# Patient Record
Sex: FEMALE | Race: BLACK OR AFRICAN AMERICAN | NOT HISPANIC OR LATINO | URBAN - METROPOLITAN AREA
[De-identification: names, ages, dates, MRNs, and addresses within clinical notes are randomized per-mention and may not be internally consistent; named-entity substitution may affect disease eponyms.]

---

## 2018-10-23 ENCOUNTER — EMERGENCY (EMERGENCY)
Facility: HOSPITAL | Age: 73
LOS: 1 days | Discharge: HOME | End: 2018-10-23
Admitting: EMERGENCY MEDICINE
Payer: MEDICARE

## 2018-10-23 VITALS
TEMPERATURE: 98 F | OXYGEN SATURATION: 99 % | RESPIRATION RATE: 18 BRPM | WEIGHT: 171.96 LBS | DIASTOLIC BLOOD PRESSURE: 86 MMHG | SYSTOLIC BLOOD PRESSURE: 153 MMHG | HEART RATE: 72 BPM

## 2018-10-23 DIAGNOSIS — Z79.899 OTHER LONG TERM (CURRENT) DRUG THERAPY: ICD-10-CM

## 2018-10-23 DIAGNOSIS — I10 ESSENTIAL (PRIMARY) HYPERTENSION: ICD-10-CM

## 2018-10-23 DIAGNOSIS — H66.92 OTITIS MEDIA, UNSPECIFIED, LEFT EAR: ICD-10-CM

## 2018-10-23 DIAGNOSIS — H92.02 OTALGIA, LEFT EAR: ICD-10-CM

## 2018-10-23 DIAGNOSIS — Z79.82 LONG TERM (CURRENT) USE OF ASPIRIN: ICD-10-CM

## 2018-10-23 PROCEDURE — 99283 EMERGENCY DEPT VISIT LOW MDM: CPT

## 2018-10-23 RX ORDER — AMOXICILLIN 250 MG/5ML
1 SUSPENSION, RECONSTITUTED, ORAL (ML) ORAL
Qty: 21 | Refills: 0 | OUTPATIENT
Start: 2018-10-23 | End: 2018-10-29

## 2018-10-23 NOTE — ED PROVIDER NOTE - OBJECTIVE STATEMENT
73yoF with pmhx of HTN presents with L ear pain and decreased hearing x1 month. Pt reports history of cerumen impaction of R ear, was seen by PMD for cerumen impaction on left. Pt states ear was irrigated in the office and she was instructed to use peroxide drops nightly. Pt reports she has been following these instructions but her symptoms have not improved. Denies fever, nasal congestion, sore throat, tinnitus, drainage from ear.

## 2018-10-23 NOTE — ED PROVIDER NOTE - PHYSICAL EXAMINATION
VITAL SIGNS: I have reviewed nursing notes and confirm.  CONSTITUTIONAL: Well-developed; well-nourished; in no acute distress.   SKIN:  warm and dry, no acute rash.   HEAD:  normocephalic, atraumatic.  EYES: PERRL, EOM intact; conjunctiva and sclera clear.  ENT: No nasal discharge; airway clear. Purulent discharge behind left TM with absent light reflex, slight bulging. No canal edema or erythema. Right TM non-erythematous, non-edematous.   NECK: Supple; non tender.  CARD: S1, S2 normal; no murmurs, gallops, or rubs. Regular rate and rhythm.   RESP:  Clear to auscultation b/l, no wheezes, rales or rhonchi.  ABD: Normal bowel sounds; soft; non-distended; non-tender; no guarding/ rebound.  EXT: Normal ROM. No clubbing, cyanosis or edema. 2+ pulses to b/l ue/le.  NEURO: Alert, oriented, grossly unremarkable  PSYCH: Cooperative, mood and affect appropriate.

## 2018-10-23 NOTE — ED PROVIDER NOTE - MEDICAL DECISION MAKING DETAILS
ED course unremarkable - afebrile and hemodynamically stable. Exam c/w otitis media. Will start pt on course of amoxicillin. Provide follow up with ENT. Return precautions given.

## 2018-10-23 NOTE — ED ADULT TRIAGE NOTE - CHIEF COMPLAINT QUOTE
pt c/o decreased hearing to her left ear for about 2 months. pt states " I always get ear wax in my right ear and now I think its on my left" pt denies ear pain, discharge, fever.

## 2023-04-20 NOTE — ED PROVIDER NOTE - DISCUSSED CLINICAL AND RADIOLOGICAL FINDINGS WITH, MDM
patient Benzoyl Peroxide Counseling: Patient counseled that medicine may cause skin irritation and bleach clothing.  In the event of skin irritation, the patient was advised to reduce the amount of the drug applied or use it less frequently.   The patient verbalized understanding of the proper use and possible adverse effects of benzoyl peroxide.  All of the patient's questions and concerns were addressed.

## 2023-10-08 ENCOUNTER — INPATIENT (INPATIENT)
Facility: HOSPITAL | Age: 78
LOS: 9 days | Discharge: NURSING, REHAB OR LONG-TERM CARE | DRG: 64 | End: 2023-10-18
Attending: INTERNAL MEDICINE | Admitting: INTERNAL MEDICINE
Payer: MEDICARE

## 2023-10-08 VITALS
SYSTOLIC BLOOD PRESSURE: 239 MMHG | OXYGEN SATURATION: 97 % | WEIGHT: 114.64 LBS | TEMPERATURE: 98 F | DIASTOLIC BLOOD PRESSURE: 148 MMHG | HEART RATE: 85 BPM | HEIGHT: 59 IN | RESPIRATION RATE: 20 BRPM

## 2023-10-08 PROCEDURE — 99291 CRITICAL CARE FIRST HOUR: CPT

## 2023-10-08 NOTE — ED PROVIDER NOTE - NS ED MD DISPO ISOLATION TYPES
[FreeTextEntry1] : I, Dr. Kane Guerra, personally performed the evaluation and management (E/M) services for this established patient who presents today with (a) new problem(s)/exacerbation of (an) existing condition(s).  That E/M includes conducting the examination, assessing all new/exacerbated conditions, and establishing a new plan of care.  Today, my ACP, Caroline Infante NP, was here to observe my evaluation and management services for this new problem/exacerbated condition to be followed going forward. \par \par \par The documentation for this encounter was entered by Zenia Boswell acting as a scribe for Dr.Gary Guerra.\par 
None

## 2023-10-08 NOTE — ED PROVIDER NOTE - CLINICAL SUMMARY MEDICAL DECISION MAKING FREE TEXT BOX
Patient presenting with symptoms suggestive of acute stroke, outside window for thrombolytics based off LKW now +6 hours - stroke code activated, labs/imaging per protocol.

## 2023-10-08 NOTE — ED PROVIDER NOTE - PROGRESS NOTE DETAILS
Patient still significantly hypertensive - labetalol IVP ordered.  CTA noting thoracic aortic aneurysm, so further imaging ordered to evaluate this.  Also noted possible cerebral aneurysm, no evidence of SAH.  Will require admission for BP management and further workup - if still significantly hypertensive after labetalol may consult ICU. Patient had brief response to labetalol but shortly after administration back to being significantly hypertensive.  CTA performed noting significant dilatation/aneurysm of descending thoracic aneurysm but no dissection on my review, also bilateral staghorn calculi of kidneys.  ICU consulted given degree of hypertension for possible admission for management.

## 2023-10-08 NOTE — ED ADULT TRIAGE NOTE - CHIEF COMPLAINT QUOTE
Rt arm weakness, as per  "pt was walking and her legs gave out" denies any head injury, last seen normal 1115pm

## 2023-10-08 NOTE — ED PROVIDER NOTE - OBJECTIVE STATEMENT
78-year-old woman presenting with  for right-sided weakness.  He reports that she has been having some baseline ambulatory difficulty ongoing for months but he went to go check on her this evening and found that she could not move her right side or stand.  The last time he saw her at her baseline was around 5 PM.  There is no reported past medical history.  They do not report that she takes any medications.

## 2023-10-08 NOTE — ED PROVIDER NOTE - PHYSICAL EXAMINATION
Exam:  General: Patient well appearing, severely hypertensive otherwise vital signs within normal limits  HEENT: airway patent with moist mucous membranes  Cardiac: RRR S1/S2 with strong peripheral pulses  Respiratory: lungs clear without respiratory distress  GI: abdomen soft, non tender, non distended  Neuro: L facial droop, RUE 1/5, RLE 1/5, LUE 3/5, LLE 3/5, SLTI  Skin: warm, well perfused  Psych: normal mood and affect

## 2023-10-09 DIAGNOSIS — I16.1 HYPERTENSIVE EMERGENCY: ICD-10-CM

## 2023-10-09 LAB
A1C WITH ESTIMATED AVERAGE GLUCOSE RESULT: 5.9 % — HIGH (ref 4–5.6)
ALBUMIN SERPL ELPH-MCNC: 3.5 G/DL — SIGNIFICANT CHANGE UP (ref 3.5–5)
ALBUMIN SERPL ELPH-MCNC: 3.8 G/DL — SIGNIFICANT CHANGE UP (ref 3.5–5)
ALP SERPL-CCNC: 119 U/L — SIGNIFICANT CHANGE UP (ref 40–120)
ALP SERPL-CCNC: 122 U/L — HIGH (ref 40–120)
ALT FLD-CCNC: 15 U/L DA — SIGNIFICANT CHANGE UP (ref 10–60)
ALT FLD-CCNC: 16 U/L DA — SIGNIFICANT CHANGE UP (ref 10–60)
ANION GAP SERPL CALC-SCNC: 4 MMOL/L — LOW (ref 5–17)
ANION GAP SERPL CALC-SCNC: 7 MMOL/L — SIGNIFICANT CHANGE UP (ref 5–17)
APPEARANCE UR: ABNORMAL
APTT BLD: 28.1 SEC — SIGNIFICANT CHANGE UP (ref 24.5–35.6)
APTT BLD: 31.7 SEC — SIGNIFICANT CHANGE UP (ref 24.5–35.6)
AST SERPL-CCNC: 12 U/L — SIGNIFICANT CHANGE UP (ref 10–40)
AST SERPL-CCNC: 14 U/L — SIGNIFICANT CHANGE UP (ref 10–40)
BACTERIA # UR AUTO: ABNORMAL /HPF
BASOPHILS # BLD AUTO: 0.02 K/UL — SIGNIFICANT CHANGE UP (ref 0–0.2)
BASOPHILS # BLD AUTO: 0.03 K/UL — SIGNIFICANT CHANGE UP (ref 0–0.2)
BASOPHILS NFR BLD AUTO: 0.3 % — SIGNIFICANT CHANGE UP (ref 0–2)
BASOPHILS NFR BLD AUTO: 0.4 % — SIGNIFICANT CHANGE UP (ref 0–2)
BILIRUB SERPL-MCNC: 0.5 MG/DL — SIGNIFICANT CHANGE UP (ref 0.2–1.2)
BILIRUB SERPL-MCNC: 0.5 MG/DL — SIGNIFICANT CHANGE UP (ref 0.2–1.2)
BILIRUB UR-MCNC: NEGATIVE — SIGNIFICANT CHANGE UP
BUN SERPL-MCNC: 13 MG/DL — SIGNIFICANT CHANGE UP (ref 7–18)
BUN SERPL-MCNC: 15 MG/DL — SIGNIFICANT CHANGE UP (ref 7–18)
CALCIUM SERPL-MCNC: 9.4 MG/DL — SIGNIFICANT CHANGE UP (ref 8.4–10.5)
CALCIUM SERPL-MCNC: 9.6 MG/DL — SIGNIFICANT CHANGE UP (ref 8.4–10.5)
CHLORIDE SERPL-SCNC: 103 MMOL/L — SIGNIFICANT CHANGE UP (ref 96–108)
CHLORIDE SERPL-SCNC: 106 MMOL/L — SIGNIFICANT CHANGE UP (ref 96–108)
CHOLEST SERPL-MCNC: 216 MG/DL — HIGH
CO2 SERPL-SCNC: 27 MMOL/L — SIGNIFICANT CHANGE UP (ref 22–31)
CO2 SERPL-SCNC: 30 MMOL/L — SIGNIFICANT CHANGE UP (ref 22–31)
COLOR SPEC: YELLOW — SIGNIFICANT CHANGE UP
CREAT SERPL-MCNC: 0.9 MG/DL — SIGNIFICANT CHANGE UP (ref 0.5–1.3)
CREAT SERPL-MCNC: 0.9 MG/DL — SIGNIFICANT CHANGE UP (ref 0.5–1.3)
DIFF PNL FLD: NEGATIVE — SIGNIFICANT CHANGE UP
EGFR: 66 ML/MIN/1.73M2 — SIGNIFICANT CHANGE UP
EGFR: 66 ML/MIN/1.73M2 — SIGNIFICANT CHANGE UP
EOSINOPHIL # BLD AUTO: 0.01 K/UL — SIGNIFICANT CHANGE UP (ref 0–0.5)
EOSINOPHIL # BLD AUTO: 0.04 K/UL — SIGNIFICANT CHANGE UP (ref 0–0.5)
EOSINOPHIL NFR BLD AUTO: 0.1 % — SIGNIFICANT CHANGE UP (ref 0–6)
EOSINOPHIL NFR BLD AUTO: 0.5 % — SIGNIFICANT CHANGE UP (ref 0–6)
EPI CELLS # UR: PRESENT
ESTIMATED AVERAGE GLUCOSE: 123 MG/DL — HIGH (ref 68–114)
GLUCOSE SERPL-MCNC: 114 MG/DL — HIGH (ref 70–99)
GLUCOSE SERPL-MCNC: 124 MG/DL — HIGH (ref 70–99)
GLUCOSE UR QL: NEGATIVE MG/DL — SIGNIFICANT CHANGE UP
HCT VFR BLD CALC: 39.7 % — SIGNIFICANT CHANGE UP (ref 34.5–45)
HCT VFR BLD CALC: 41.4 % — SIGNIFICANT CHANGE UP (ref 34.5–45)
HCV AB S/CO SERPL IA: 0.35 S/CO — SIGNIFICANT CHANGE UP (ref 0–0.99)
HCV AB SERPL-IMP: SIGNIFICANT CHANGE UP
HDLC SERPL-MCNC: 60 MG/DL — SIGNIFICANT CHANGE UP
HGB BLD-MCNC: 12.6 G/DL — SIGNIFICANT CHANGE UP (ref 11.5–15.5)
HGB BLD-MCNC: 13.2 G/DL — SIGNIFICANT CHANGE UP (ref 11.5–15.5)
IMM GRANULOCYTES NFR BLD AUTO: 0.4 % — SIGNIFICANT CHANGE UP (ref 0–0.9)
IMM GRANULOCYTES NFR BLD AUTO: 0.4 % — SIGNIFICANT CHANGE UP (ref 0–0.9)
INR BLD: 0.95 RATIO — SIGNIFICANT CHANGE UP (ref 0.85–1.18)
INR BLD: 1.05 RATIO — SIGNIFICANT CHANGE UP (ref 0.85–1.18)
KETONES UR-MCNC: NEGATIVE MG/DL — SIGNIFICANT CHANGE UP
LEUKOCYTE ESTERASE UR-ACNC: ABNORMAL
LIPID PNL WITH DIRECT LDL SERPL: 138 MG/DL — HIGH
LYMPHOCYTES # BLD AUTO: 1.16 K/UL — SIGNIFICANT CHANGE UP (ref 1–3.3)
LYMPHOCYTES # BLD AUTO: 1.96 K/UL — SIGNIFICANT CHANGE UP (ref 1–3.3)
LYMPHOCYTES # BLD AUTO: 14.8 % — SIGNIFICANT CHANGE UP (ref 13–44)
LYMPHOCYTES # BLD AUTO: 23.6 % — SIGNIFICANT CHANGE UP (ref 13–44)
MAGNESIUM SERPL-MCNC: 2.2 MG/DL — SIGNIFICANT CHANGE UP (ref 1.6–2.6)
MCHC RBC-ENTMCNC: 27.9 PG — SIGNIFICANT CHANGE UP (ref 27–34)
MCHC RBC-ENTMCNC: 28 PG — SIGNIFICANT CHANGE UP (ref 27–34)
MCHC RBC-ENTMCNC: 31.7 GM/DL — LOW (ref 32–36)
MCHC RBC-ENTMCNC: 31.9 GM/DL — LOW (ref 32–36)
MCV RBC AUTO: 87.7 FL — SIGNIFICANT CHANGE UP (ref 80–100)
MCV RBC AUTO: 88 FL — SIGNIFICANT CHANGE UP (ref 80–100)
MONOCYTES # BLD AUTO: 0.33 K/UL — SIGNIFICANT CHANGE UP (ref 0–0.9)
MONOCYTES # BLD AUTO: 0.49 K/UL — SIGNIFICANT CHANGE UP (ref 0–0.9)
MONOCYTES NFR BLD AUTO: 4.2 % — SIGNIFICANT CHANGE UP (ref 2–14)
MONOCYTES NFR BLD AUTO: 5.9 % — SIGNIFICANT CHANGE UP (ref 2–14)
NEUTROPHILS # BLD AUTO: 5.77 K/UL — SIGNIFICANT CHANGE UP (ref 1.8–7.4)
NEUTROPHILS # BLD AUTO: 6.28 K/UL — SIGNIFICANT CHANGE UP (ref 1.8–7.4)
NEUTROPHILS NFR BLD AUTO: 69.2 % — SIGNIFICANT CHANGE UP (ref 43–77)
NEUTROPHILS NFR BLD AUTO: 80.2 % — HIGH (ref 43–77)
NITRITE UR-MCNC: NEGATIVE — SIGNIFICANT CHANGE UP
NON HDL CHOLESTEROL: 156 MG/DL — HIGH
NRBC # BLD: 0 /100 WBCS — SIGNIFICANT CHANGE UP (ref 0–0)
NRBC # BLD: 0 /100 WBCS — SIGNIFICANT CHANGE UP (ref 0–0)
PH UR: 8 — SIGNIFICANT CHANGE UP (ref 5–8)
PHOSPHATE SERPL-MCNC: 3.7 MG/DL — SIGNIFICANT CHANGE UP (ref 2.5–4.5)
PLATELET # BLD AUTO: 190 K/UL — SIGNIFICANT CHANGE UP (ref 150–400)
PLATELET # BLD AUTO: 199 K/UL — SIGNIFICANT CHANGE UP (ref 150–400)
POTASSIUM SERPL-MCNC: 3.1 MMOL/L — LOW (ref 3.5–5.3)
POTASSIUM SERPL-MCNC: 3.8 MMOL/L — SIGNIFICANT CHANGE UP (ref 3.5–5.3)
POTASSIUM SERPL-SCNC: 3.1 MMOL/L — LOW (ref 3.5–5.3)
POTASSIUM SERPL-SCNC: 3.8 MMOL/L — SIGNIFICANT CHANGE UP (ref 3.5–5.3)
PROT SERPL-MCNC: 7.8 G/DL — SIGNIFICANT CHANGE UP (ref 6–8.3)
PROT SERPL-MCNC: 8.1 G/DL — SIGNIFICANT CHANGE UP (ref 6–8.3)
PROT UR-MCNC: NEGATIVE MG/DL — SIGNIFICANT CHANGE UP
PROTHROM AB SERPL-ACNC: 10.8 SEC — SIGNIFICANT CHANGE UP (ref 9.5–13)
PROTHROM AB SERPL-ACNC: 11.9 SEC — SIGNIFICANT CHANGE UP (ref 9.5–13)
RBC # BLD: 4.51 M/UL — SIGNIFICANT CHANGE UP (ref 3.8–5.2)
RBC # BLD: 4.72 M/UL — SIGNIFICANT CHANGE UP (ref 3.8–5.2)
RBC # FLD: 12.8 % — SIGNIFICANT CHANGE UP (ref 10.3–14.5)
RBC # FLD: 13 % — SIGNIFICANT CHANGE UP (ref 10.3–14.5)
RBC CASTS # UR COMP ASSIST: 2 /HPF — SIGNIFICANT CHANGE UP (ref 0–4)
SODIUM SERPL-SCNC: 137 MMOL/L — SIGNIFICANT CHANGE UP (ref 135–145)
SODIUM SERPL-SCNC: 140 MMOL/L — SIGNIFICANT CHANGE UP (ref 135–145)
SP GR SPEC: 1.02 — SIGNIFICANT CHANGE UP (ref 1–1.03)
TRIGL SERPL-MCNC: 91 MG/DL — SIGNIFICANT CHANGE UP
TROPONIN I, HIGH SENSITIVITY RESULT: 24.4 NG/L — SIGNIFICANT CHANGE UP
UROBILINOGEN FLD QL: 0.2 MG/DL — SIGNIFICANT CHANGE UP (ref 0.2–1)
WBC # BLD: 7.83 K/UL — SIGNIFICANT CHANGE UP (ref 3.8–10.5)
WBC # BLD: 8.32 K/UL — SIGNIFICANT CHANGE UP (ref 3.8–10.5)
WBC # FLD AUTO: 7.83 K/UL — SIGNIFICANT CHANGE UP (ref 3.8–10.5)
WBC # FLD AUTO: 8.32 K/UL — SIGNIFICANT CHANGE UP (ref 3.8–10.5)
WBC UR QL: 12 /HPF — HIGH (ref 0–5)

## 2023-10-09 PROCEDURE — 71275 CT ANGIOGRAPHY CHEST: CPT | Mod: 26,MA

## 2023-10-09 PROCEDURE — 0042T: CPT | Mod: MA

## 2023-10-09 PROCEDURE — 70496 CT ANGIOGRAPHY HEAD: CPT | Mod: 26,MA

## 2023-10-09 PROCEDURE — 99291 CRITICAL CARE FIRST HOUR: CPT

## 2023-10-09 PROCEDURE — 70498 CT ANGIOGRAPHY NECK: CPT | Mod: 26,MA

## 2023-10-09 PROCEDURE — 99291 CRITICAL CARE FIRST HOUR: CPT | Mod: GC

## 2023-10-09 PROCEDURE — 74174 CTA ABD&PLVS W/CONTRAST: CPT | Mod: 26,MA

## 2023-10-09 PROCEDURE — 70450 CT HEAD/BRAIN W/O DYE: CPT | Mod: 26,MA,59

## 2023-10-09 RX ORDER — HEPARIN SODIUM 5000 [USP'U]/ML
4000 INJECTION INTRAVENOUS; SUBCUTANEOUS ONCE
Refills: 0 | Status: COMPLETED | OUTPATIENT
Start: 2023-10-09 | End: 2023-10-09

## 2023-10-09 RX ORDER — INFLUENZA VIRUS VACCINE 15; 15; 15; 15 UG/.5ML; UG/.5ML; UG/.5ML; UG/.5ML
0.7 SUSPENSION INTRAMUSCULAR ONCE
Refills: 0 | Status: DISCONTINUED | OUTPATIENT
Start: 2023-10-09 | End: 2023-10-18

## 2023-10-09 RX ORDER — LABETALOL HCL 100 MG
0.5 TABLET ORAL
Qty: 200 | Refills: 0 | Status: DISCONTINUED | OUTPATIENT
Start: 2023-10-09 | End: 2023-10-10

## 2023-10-09 RX ORDER — ENOXAPARIN SODIUM 100 MG/ML
40 INJECTION SUBCUTANEOUS EVERY 24 HOURS
Refills: 0 | Status: DISCONTINUED | OUTPATIENT
Start: 2023-10-09 | End: 2023-10-18

## 2023-10-09 RX ORDER — POTASSIUM CHLORIDE 20 MEQ
10 PACKET (EA) ORAL
Refills: 0 | Status: COMPLETED | OUTPATIENT
Start: 2023-10-09 | End: 2023-10-09

## 2023-10-09 RX ORDER — CHLORHEXIDINE GLUCONATE 213 G/1000ML
1 SOLUTION TOPICAL
Refills: 0 | Status: DISCONTINUED | OUTPATIENT
Start: 2023-10-09 | End: 2023-10-18

## 2023-10-09 RX ORDER — LABETALOL HCL 100 MG
10 TABLET ORAL ONCE
Refills: 0 | Status: COMPLETED | OUTPATIENT
Start: 2023-10-09 | End: 2023-10-09

## 2023-10-09 RX ORDER — ATORVASTATIN CALCIUM 80 MG/1
80 TABLET, FILM COATED ORAL AT BEDTIME
Refills: 0 | Status: DISCONTINUED | OUTPATIENT
Start: 2023-10-09 | End: 2023-10-18

## 2023-10-09 RX ORDER — HEPARIN SODIUM 5000 [USP'U]/ML
INJECTION INTRAVENOUS; SUBCUTANEOUS
Qty: 25000 | Refills: 0 | Status: DISCONTINUED | OUTPATIENT
Start: 2023-10-09 | End: 2023-10-09

## 2023-10-09 RX ORDER — LABETALOL HCL 100 MG
100 TABLET ORAL EVERY 8 HOURS
Refills: 0 | Status: DISCONTINUED | OUTPATIENT
Start: 2023-10-09 | End: 2023-10-10

## 2023-10-09 RX ORDER — NICARDIPINE HYDROCHLORIDE 30 MG/1
5 CAPSULE, EXTENDED RELEASE ORAL
Qty: 40 | Refills: 0 | Status: DISCONTINUED | OUTPATIENT
Start: 2023-10-09 | End: 2023-10-09

## 2023-10-09 RX ADMIN — Medication 10 MILLIGRAM(S): at 02:20

## 2023-10-09 RX ADMIN — Medication 100 MILLIGRAM(S): at 21:25

## 2023-10-09 RX ADMIN — ATORVASTATIN CALCIUM 80 MILLIGRAM(S): 80 TABLET, FILM COATED ORAL at 21:25

## 2023-10-09 RX ADMIN — CHLORHEXIDINE GLUCONATE 1 APPLICATION(S): 213 SOLUTION TOPICAL at 16:20

## 2023-10-09 RX ADMIN — HEPARIN SODIUM 4000 UNIT(S): 5000 INJECTION INTRAVENOUS; SUBCUTANEOUS at 13:47

## 2023-10-09 RX ADMIN — NICARDIPINE HYDROCHLORIDE 25 MG/HR: 30 CAPSULE, EXTENDED RELEASE ORAL at 04:08

## 2023-10-09 RX ADMIN — HEPARIN SODIUM 1000 UNIT(S)/HR: 5000 INJECTION INTRAVENOUS; SUBCUTANEOUS at 13:58

## 2023-10-09 RX ADMIN — Medication 100 MILLIEQUIVALENT(S): at 06:12

## 2023-10-09 RX ADMIN — Medication 100 MILLIEQUIVALENT(S): at 07:34

## 2023-10-09 RX ADMIN — NICARDIPINE HYDROCHLORIDE 25 MG/HR: 30 CAPSULE, EXTENDED RELEASE ORAL at 04:04

## 2023-10-09 RX ADMIN — Medication 30 MG/MIN: at 05:13

## 2023-10-09 RX ADMIN — ENOXAPARIN SODIUM 40 MILLIGRAM(S): 100 INJECTION SUBCUTANEOUS at 18:06

## 2023-10-09 RX ADMIN — Medication 100 MILLIGRAM(S): at 13:21

## 2023-10-09 RX ADMIN — Medication 30 MG/MIN: at 18:06

## 2023-10-09 RX ADMIN — Medication 100 MILLIEQUIVALENT(S): at 08:36

## 2023-10-09 NOTE — ED ADULT NURSE NOTE - PRO INTERPRETER NEED 2
I am an RN.     Pt states she has had urinary frequency, pain, and burning with urination.   Reviewed with Edie Gallego.  Marlene patel collect urnie and send Pt Home   Edie will review.  Pt unable to void while here but daughter did bring specimen to clinic.  TEAM to address and call pt.   Spoke with Tara SHEPHERD    Is this medication being given as a supportive medication related to a Treatment Plan (eg. Xgeva, Faslodex, ect.) or any side effects related to the Treatment Plan (eg. Neulasta, Zarxio, ect.)? No.    Pre-Injection Information: Allergies reviewed as required for injection type., Pt states feeling well, no complaints. and Pt denies signs and symptoms of infection.    Refer to MAR (medication administration record) for type of injection and medication given.  Needle Size: 27 g. 1/2\"  Patient tolerated well: Stable and Follow up appointment scheduled     Shefali Hanna is supervising clinician today.  
UA results for review to APC, specimen collected at RN visit for injection.
English

## 2023-10-09 NOTE — ED ADULT NURSE NOTE - ED STAT RN HANDOFF DETAILS
awake and responsive. admitted to ICU for hypertensive. started on nicardipine 5 mg to titrated via pump then D/C then started on labetalol drip at 0.5 mg. via pump transfer to ICU report given to haydee clifton. no acute distress noted

## 2023-10-09 NOTE — SWALLOW BEDSIDE ASSESSMENT ADULT - ORAL PREPARATORY PHASE
poor bolus containment/Reduced oral grading/Anterior loss of bolus/Lateral loss of bolus/Bolus falls into anterior sulcus poor pucker to cup presentation/Reduced oral grading Reduced oral grading confusion/forgetful of food in mouth/Reduced oral grading/Anterior loss of bolus/Bolus falls into anterior sulcus

## 2023-10-09 NOTE — PROGRESS NOTE ADULT - ASSESSMENT
78 year old female with no known PMH presents after a fall and was found to have right sided weakness with NIHHS score of 13. Symptoms have now resolved and CTH was negative for any acute infarcts but did show a possible ICA aneurysm and   CT A/P showed a large thoracic aneurysm.  Patient was found to have HTN urgency and due to the large size of the thoracic aneurysm,  will admit to ICU for closer BP monitoring with labetalol gtt.       #HTN Urgency  #TIA  #Thoracic aneurysm (6cm)  #ICA Aneurysm      =================== Neuro============================  # TIA  -p/w right sided weakness now  resolved   -NIHHS 13 on admission   -CTH showed chronic infarcts  -TNK not given due to out of window  likely had TIA  -continue with neuro checks  -will need dysphagia screen before starting PO meds   -ECHO w/ bubble, lipid panel, a1c  -will target a lower BP goal of 140-160 due to enlarged Thoracic Aneurysm    #ICA Aneurysm  -CTH shows 4 mm outpouching projecting posteriorly inferiorly off the right  supraclinoid internal carotid artery at its terminus, concerning for aneurysm  -neuro checks per protocol  - consult Neurology      ================= Cardiovascular==========================  # Hypertensive Urgency  - no hx of HTN  -p/w /110, asymptomatic  - EKG NSR , trop x1 negative   -s/p labetalol 10mg IV  - will target a lower BP goal of 140-160 due to enlarged Thoracic Aneurysm  - c/w labetalol gtt     # Thoracic Aneurysm  -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   -no evidence of aortic dissection  - Goal -160   -Consult cardiothoracic surgery     # Mural Thrombi  -1 cm mural thrombi around the aneurysm  -if no surgical intervention for aneurysm, consider starting AC, as patient may have had embolic CVA    ================- Pulm=================================  # No issues     ==================ID===================================  # No issues     ================= Nephro================================  # Renal Cyst  -asymptomatic   - incidentally found to have a 2.8 cm sized the right renal cyst      =================GI====================================  # NPO  dysphagia screen     ================ Heme==================================  #no issues     =================Endocrine===============================  # No issues     ================= Skin/Catheters============================  Peripheral IV lines     =================Prophylaxis =============================  DVT prophylaxis : SCDs for now   GI prophylaxis     ==================GOC==================================  FULL CODE   Disposition : ICU       78 year old female with no known PMH presents after a fall and was found to have right sided weakness with NIHHS score of 13. Symptoms have now resolved and CTH was negative for any acute infarcts but did show a possible ICA aneurysm and   CT A/P showed a large thoracic aneurysm.  Patient was found to have HTN urgency and due to the large size of the thoracic aneurysm,  will admit to ICU for closer BP monitoring with labetalol gtt.       #HTN Urgency  #TIA  #Thoracic aneurysm (6cm)  #ICA Aneurysm      Neuro  # CVA unknown last known well (NIHHS 13 on admission) wit right sided deficits  - CTH 10/8 with no acute ischemia or hemorrhage. 4 mm outpouching projecting posteriorly inferiorly off the right supraclinoid internal carotid artery at its terminus, concerning for aneurysm.  -TNK not given due to out of window  -continue with neuro checks  - Official SLP done and patient with concern for oropharyngeal dysphagia started on puree diet with mildly thick liquids.   - pending ECHO w/ bubble for stroke work-up  - pending repeat imaging    #ICA Aneurysm  - CTH shows 4 mm outpouching projecting posteriorly inferiorly off the right  supraclinoid internal carotid artery at its terminus, concerning for aneurysm  -neuro checks per protocol  - Neurology, Dr. Briseno consulted and following    Cardiovascular  # Hypertensive Emergency ('s)  # 6mm Descending Aortic Aneurysm   #Hyperlipidemia  - EKG NSR , trop x1 negative   - s/p labetalol 10mg IV  - Will target a lower BP goal of 140-160 due to enlarged Thoracic Aneurysm  - Started Labetalol 100mg TID and continue labetalol gtt until pressure is within range  - Atorvastatin 80mg qd    # Thoracic Aneurysm  -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   - no evidence of aortic dissection  - Goal -160   - CT Surgery consulted and not offering any acute surgical interventions. Dr. Brooks to place official recs. Per our conversation, heparin gtt to be discontinued given possible chronicity of aneurysm and mural thrombi.     # Mural Thrombi  -1 cm mural thrombi around the aneurysm  - if no surgical intervention for aneurysm, consider starting AC, as patient may have had embolic CVA  - Per Neurology, mural thrombi likely not the nidus for CVA given location.     Pulm  # No issues     ID  # No issues     Nephro  # Renal Cyst  - asymptomatic   - incidentally found to have a 2.8 cm sized the right renal cyst    GI  #Dysphagia  -  Pureed diet and moderately thickened liquids, seen by SLP.     Heme  #Per neuro, ok to start DVT ppx.      Endocrine  # No issues   - f/u A1C    Skin/Catheters  - Peripheral IV lines     Prophylaxis   - DVT prophylaxis : Lovenox   - SCDs    GOC  FULL CODE   Disposition : ICU

## 2023-10-09 NOTE — PATIENT PROFILE ADULT - FALL HARM RISK - HARM RISK INTERVENTIONS

## 2023-10-09 NOTE — PROGRESS NOTE ADULT - CRITICAL CARE ATTENDING COMMENT
78 year old female with no known PMH presents after a fall and was found to have right sided weakness with NIHHS score of 13. CTH was negative for any acute infarcts but did show a possible ICA aneurysm and   CT A/P showed a large thoracic aneurysm. She was ruled out for thrombolytic therapy as last known time was unknown. Patient was found to have HTN urgency and due to the large size of the thoracic aneurysm,  will admit to ICU for closer BP monitoring with labetalol gtt.     Assessment:  1. Acute ischemic stroke  2. Hypertensive emergency   3. 6cm descending thoracic aneurysm   4. Aortic mural thrombus   5. ICA Aneurysm    Plan:  - Close neurologic monitoring   - Tight BP control, keep  - 160  - Start heparin infusion  - CT surgery consulted, plan for transfer to CT ICU   - Obtain Echo  - Check Hba1c   - Lipid panel noted, start statin   - Will defer aspirin as may need surgical intervention   - Swallow evaluation   - PT evaluation   - Cont. ICU care for close hemodynamic monitoring 78 year old female with no known PMH presents after a fall and was found to have right sided weakness with NIHHS score of 13. CTH was negative for any acute infarcts but did show a possible ICA aneurysm and   CT A/P showed a large thoracic aneurysm. She was ruled out for thrombolytic therapy as last known time was unknown. Patient was found to have HTN urgency and due to the large size of the thoracic aneurysm,  will admit to ICU for closer BP monitoring with labetalol gtt.     Assessment:  1. Acute ischemic stroke  2. Hypertensive emergency   3. 6cm descending thoracic aneurysm   4. Aortic mural thrombus   5. ICA Aneurysm    Plan:  - Close neurologic monitoring   - Tight BP control, keep  - 160  - Start heparin infusion  - CT surgery consulted, plan for transfer to CT ICU   - Obtain Echo  - Check Hba1c   - Lipid panel noted, start statin   - Will defer aspirin as may need surgical intervention   - Neurology consult  - Swallow evaluation   - PT evaluation   - Cont. ICU care for close hemodynamic monitoring

## 2023-10-09 NOTE — H&P ADULT - ATTENDING COMMENTS
patient is a 79 y/o female with unknown PMH ( poor historian ) who presents to the ED with her  who brought her after he noted right sided weakness and reported her  increasing difficulty ambulating over the last few months. Her BP was severely elevated ( 220/110 mmhg ), She had a CTperfusion study of the head as the ED called a code stroke. The study didn't show any perfusion  mismatch but the was a [id number] supraclinoid right internal carotid outpouching concerning for an aneurysm. Also noted was a 6cm descending thoracic aortic aneurysm. I have admitted the patient to control BP and potentially refer her to cardiothoracic surgery. Dx -accelerated HTN. I reviewed all laboratory and roentgenographic data and any previous medical record from [facility] that existed. I also discussed the case with the ED attending or referring physician.

## 2023-10-09 NOTE — SWALLOW BEDSIDE ASSESSMENT ADULT - SWALLOW EVAL: RECOMMENDED FEEDING/EATING TECHNIQUES
allow for swallow between intakes/alternate food with liquid/check mouth frequently for oral residue/pocketing/crush medication (when feasible)/hard swallow w/ each bite or sip/no straws/position upright (90 degrees)/small sips/bites

## 2023-10-09 NOTE — CHART NOTE - NSCHARTNOTEFT_GEN_A_CORE
Transfer Center called this morning to obtain a cardiothoracic consult for evaluation of descending aortic aneurysm. Patient was presented and initially accepted to CT ICU at [facility]. Per their recommendations, patient was also started on a heparin gtt. Prior to patient transfer, CT surg requested that patient be transferred to Neuro ICU for ICA evaluation. Patient was presented to Neuro ICU Attending Dr. Filipe Waite at [facility] but no acute surgical interventions were offered for ICA as it was stable and asymptomatic. Dr. Brooks from CT Surg also differed any acute surgical interventions given that on image review arotic aneurysm appears to be chronic and stable. He also recommended stopping the heparin drip. Transfer cancelled and patient to remain in [facility] ICU.

## 2023-10-09 NOTE — SWALLOW BEDSIDE ASSESSMENT ADULT - COMMENTS
Pt AA+Ox1 (name only), confused, drowsy, perseverative & nonsequitur responses, HOB elevated to 90°. +B/L wrist restraints. Pt needed auditory & tactile cues (prompting at head) to stay awake and keep head at midline. Notable listing to the left. cued for repeat swallow >50% spilled out of mouth

## 2023-10-09 NOTE — H&P ADULT - HISTORY OF PRESENT ILLNESS
78 year old female with no known PMH presents after a fall. According to  at bedside, patient was found on the floor and was unable to move her right upper/lower extremities. Denies any LOC, seizure like activity, or urinary incontinence  states that patient has been getting increasingly forgetful for the past few months, and has not seen a physician in over a year. Patient states she does not remember the fall, and currently has no complaints. Denies any headaches dizziness, fever, chills, visual changes, weakness, numbness, tingling chest pain, SOB, abdominal pain, or change in bowel habits.     In the ED:  Afebrile, /148, HR 85, 95% on RA  NIHHS 13  CTH/Angio showed old infarcts w/ 4mm ICA outpouch concerning for aneurysm  s/p labetalol 10mg IV + nicardipine gtt

## 2023-10-09 NOTE — PROGRESS NOTE ADULT - SUBJECTIVE AND OBJECTIVE BOX
INTERVAL HPI/OVERNIGHT EVENTS:       PRESSORS: [ ] YES [ ] NO  WHICH:      Antimicrobial:    Cardiovascular:  labetalol Infusion 0.5 mG/Min IV Continuous <Continuous>    Pulmonary:    Hematalogic:    Other:  influenza  Vaccine (HIGH DOSE) 0.7 milliLiter(s) IntraMuscular once  potassium chloride  10 mEq/100 mL IVPB 10 milliEquivalent(s) IV Intermittent every 1 hour    influenza  Vaccine (HIGH DOSE) 0.7 milliLiter(s) IntraMuscular once  labetalol Infusion 0.5 mG/Min IV Continuous <Continuous>  potassium chloride  10 mEq/100 mL IVPB 10 milliEquivalent(s) IV Intermittent every 1 hour    Drug Dosing Weight  Height (cm): 149.9 (09 Oct 2023 00:21)  Weight (kg): 52 (09 Oct 2023 00:21)  BMI (kg/m2): 23.1 (09 Oct 2023 00:21)  BSA (m2): 1.46 (09 Oct 2023 00:21)    PHYSICAL EXAM:  LINES/DRAINS/DEVICES  CENTRAL LINE: [ ] YES [ ] NO  LOCATION:     MENESES: [ ] YES [ ] NO     A-LINE:  [ ] YES [ ] NO  LOCATION:       ICU Vital Signs Last 24 Hrs  T(C): 35.7 (09 Oct 2023 05:43), Max: 36.9 (09 Oct 2023 02:38)  T(F): 96.3 (09 Oct 2023 05:43), Max: 98.5 (09 Oct 2023 04:45)  HR: 69 (09 Oct 2023 07:00) (68 - 85)  BP: 165/96 (09 Oct 2023 07:00) (152/101 - 239/148)  BP(mean): 116 (09 Oct 2023 07:00) (111 - 120)  ABP: --  ABP(mean): --  RR: 18 (09 Oct 2023 07:00) (13 - 20)  SpO2: 97% (09 Oct 2023 07:00) (95% - 100%)    O2 Parameters below as of 09 Oct 2023 04:00  Patient On (Oxygen Delivery Method): room air          10-08 @ 07:01  -  10-09 @ 07:00  --------------------------------------------------------  IN: 190 mL / OUT: 0 mL / NET: 190 mL            LABS:  CBC Full  -  ( 09 Oct 2023 00:18 )  WBC Count : 8.32 K/uL  RBC Count : 4.51 M/uL  Hemoglobin : 12.6 g/dL  Hematocrit : 39.7 %  Platelet Count - Automated : 199 K/uL  Mean Cell Volume : 88.0 fl  Mean Cell Hemoglobin : 27.9 pg  Mean Cell Hemoglobin Concentration : 31.7 gm/dL  Auto Neutrophil # : 5.77 K/uL  Auto Lymphocyte # : 1.96 K/uL  Auto Monocyte # : 0.49 K/uL  Auto Eosinophil # : 0.04 K/uL  Auto Basophil # : 0.03 K/uL  Auto Neutrophil % : 69.2 %  Auto Lymphocyte % : 23.6 %  Auto Monocyte % : 5.9 %  Auto Eosinophil % : 0.5 %  Auto Basophil % : 0.4 %    10-09    140  |  103  |  15  ----------------------------<  114<H>  3.1<L>   |  30  |  0.90    Ca    9.6      09 Oct 2023 00:18    TPro  8.1  /  Alb  3.8  /  TBili  0.5  /  DBili  x   /  AST  14  /  ALT  15  /  AlkPhos  119  10    PT/INR - ( 09 Oct 2023 00:18 )   PT: 10.8 sec;   INR: 0.95 ratio         PTT - ( 09 Oct 2023 00:18 )  PTT:31.7 sec  Urinalysis Basic - ( 09 Oct 2023 01:26 )    Color: Yellow / Appearance: Cloudy / S.025 / pH: x  Gluc: x / Ketone: Negative mg/dL  / Bili: Negative / Urobili: 0.2 mg/dL   Blood: x / Protein: Negative mg/dL / Nitrite: Negative   Leuk Esterase: Moderate / RBC: 2 /HPF / WBC 12 /HPF   Sq Epi: x / Non Sq Epi: x / Bacteria: Few /HPF        RADIOLOGY & ADDITIONAL STUDIES REVIEWED DURING TEAM ROUNDS     INTERVAL HPI/OVERNIGHT EVENTS:   -No acute events overnight. Pt assessed at bedside and remains with right sided deficits and right facial droop.    Cardiovascular:  labetalol Infusion 0.5 mG/Min IV Continuous <Continuous>    Pulmonary:    Hematalogic:    Other:  influenza  Vaccine (HIGH DOSE) 0.7 milliLiter(s) IntraMuscular once  potassium chloride  10 mEq/100 mL IVPB 10 milliEquivalent(s) IV Intermittent every 1 hour    influenza  Vaccine (HIGH DOSE) 0.7 milliLiter(s) IntraMuscular once  labetalol Infusion 0.5 mG/Min IV Continuous <Continuous>  potassium chloride  10 mEq/100 mL IVPB 10 milliEquivalent(s) IV Intermittent every 1 hour    Drug Dosing Weight  Height (cm): 149.9 (09 Oct 2023 00:21)  Weight (kg): 52 (09 Oct 2023 00:21)  BMI (kg/m2): 23.1 (09 Oct 2023 00:21)  BSA (m2): 1.46 (09 Oct 2023 00:21)    PHYSICAL EXAM:    General: Patient well appearing, supine in bed, awake, alert and following commands.   HEENT: airway patent, moist mucous membranes. PERRLA.   Cardiac: RRR S1/S2 with strong peripheral pulses  Respiratory: Lung sounds clear to auscultation bilaterally.    GI: abdomen soft, non tender, non distended  Neuro: A&O x1 (person). R facial droop, RUE 1/5, RLE 1/5, LUE 4/5, LLE 3/5. Sensation to all four extremities. Dysarthric  Skin: warm, well perfused  Psych: normal mood and affect    LINES/DRAINS/DEVICES  CENTRAL LINE: [ ] YES [x] NO  LOCATION:     MENESES: [ ] YES [X] NO     A-LINE:  [ ] YES [X] NO  LOCATION:       ICU Vital Signs Last 24 Hrs  T(C): 35.7 (09 Oct 2023 05:43), Max: 36.9 (09 Oct 2023 02:38)  T(F): 96.3 (09 Oct 2023 05:43), Max: 98.5 (09 Oct 2023 04:45)  HR: 69 (09 Oct 2023 07:00) (68 - 85)  BP: 165/96 (09 Oct 2023 07:00) (152/101 - 239/148)  BP(mean): 116 (09 Oct 2023 07:00) (111 - 120)  ABP: --  ABP(mean): --  RR: 18 (09 Oct 2023 07:00) (13 - 20)  SpO2: 97% (09 Oct 2023 07:00) (95% - 100%)    O2 Parameters below as of 09 Oct 2023 04:00  Patient On (Oxygen Delivery Method): room air      10-08 @ 07:01  -  10-09 @ 07:00  --------------------------------------------------------  IN: 190 mL / OUT: 0 mL / NET: 190 mL      LABS  CBC Full  -  ( 09 Oct 2023 00:18 )  WBC Count : 8.32 K/uL  RBC Count : 4.51 M/uL  Hemoglobin : 12.6 g/dL  Hematocrit : 39.7 %  Platelet Count - Automated : 199 K/uL  Mean Cell Volume : 88.0 fl  Mean Cell Hemoglobin : 27.9 pg  Mean Cell Hemoglobin Concentration : 31.7 gm/dL  Auto Neutrophil # : 5.77 K/uL  Auto Lymphocyte # : 1.96 K/uL  Auto Monocyte # : 0.49 K/uL  Auto Eosinophil # : 0.04 K/uL  Auto Basophil # : 0.03 K/uL  Auto Neutrophil % : 69.2 %  Auto Lymphocyte % : 23.6 %  Auto Monocyte % : 5.9 %  Auto Eosinophil % : 0.5 %  Auto Basophil % : 0.4 %    10-09    140  |  103  |  15  ----------------------------<  114<H>  3.1<L>   |  30  |  0.90    Ca    9.6      09 Oct 2023 00:18    TPro  8.1  /  Alb  3.8  /  TBili  0.5  /  DBili  x   /  AST  14  /  ALT  15  /  AlkPhos  119  10-09    PT/INR - ( 09 Oct 2023 00:18 )   PT: 10.8 sec;   INR: 0.95 ratio         PTT - ( 09 Oct 2023 00:18 )  PTT:31.7 sec  Urinalysis Basic - ( 09 Oct 2023 01:26 )    Color: Yellow / Appearance: Cloudy / S.025 / pH: x  Gluc: x / Ketone: Negative mg/dL  / Bili: Negative / Urobili: 0.2 mg/dL   Blood: x / Protein: Negative mg/dL / Nitrite: Negative   Leuk Esterase: Moderate / RBC: 2 /HPF / WBC 12 /HPF   Sq Epi: x / Non Sq Epi: x / Bacteria: Few /HPF        RADIOLOGY & ADDITIONAL STUDIES REVIEWED DURING TEAM ROUNDS

## 2023-10-09 NOTE — CONSULT NOTE ADULT - SUBJECTIVE AND OBJECTIVE BOX
NEUROLOGY CONSULT NOTE    NAME:  CINTIA PHIPPS      ASSESSMENT:  78 RHF with short-term memory loss and recent fall without loss of consciousness or seizures, concerning for syncopal event; also with chronic lacunar infarct      RECOMMENDATIONS:    - Monitor orthostatic BP & HR with each vital signs check    - Cardiovascular workup: Telemetry, Echocardiogram, Cardiothoracic surgery evaluation    - Plentiful fluid hydration (2 liters, or 8 glasses, of water daily) encouraged    - Patient counseled to maintain caution with rapid changes in position    - Maintain Atorvastatin 80mg PO QHS (goal LDL < 70 mg/dL) and add Aspirin 81mg PO Daily for secondary stroke prevention for chronic lacunar infarct    - No role for permissive hypertension - Goal /80, Treat BP > 140/90    - Maintain SBP > 100 mmHg    - PT/OT    - DVT ppx: SCDs, Enoxaparin (therapeutic anticoagulation not needed from a neurological perspective)          NOTE TO BE COMPLETED - PLEASE REFER TO ABOVE ONLY AND IGNORE INFORMATION BELOW    *******************************      CHIEF COMPLAINT:  Patient is a 78y old  Female who presents with a chief complaint of     HPI:  78 year old female with no known PMH presents after a fall. According to  at bedside, patient was found on the floor and was unable to move her right upper/lower extremities. Denies any LOC, seizure like activity, or urinary incontinence.  states that patient has been getting increasingly forgetful for the past few months, and has not seen a physician in over a year. Patient states she does not remember the fall, and currently has no complaints. Denies any headaches dizziness, fever, chills, visual changes, weakness, numbness, tingling chest pain, SOB, abdominal pain, or change in bowel habits.   In the ED:  Afebrile, /148, HR 85, 95% on RA  NIHHS 13  CTH/Angio showed old infarcts w/ 4mm ICA outpouch concerning for aneurysm  s/p labetalol 10mg IV + nicardipine gtt (09 Oct 2023 03:21)      NEURO HPI:      PAST MEDICAL & SURGICAL HISTORY:  No pertinent past medical history  No significant past surgical history      MEDICATIONS:  atorvastatin 80 milliGRAM(s) Oral at bedtime  chlorhexidine 2% Cloths 1 Application(s) Topical <User Schedule>  enoxaparin Injectable 40 milliGRAM(s) SubCutaneous every 24 hours  influenza  Vaccine (HIGH DOSE) 0.7 milliLiter(s) IntraMuscular once  labetalol 100 milliGRAM(s) Oral every 8 hours  labetalol Infusion 0.5 mG/Min IV Continuous <Continuous>  OLANZapine Injectable 2.5 milliGRAM(s) IntraMuscular once      ALLERGIES:  No Known Allergies      FAMILY HISTORY:        SOCIAL HISTORY:  Denies alcohol, tobacco, or illicit drug use      REVIEW OF SYSTEMS:  GENERAL: No fever, weight changes, fatigue  EYES: No eye pain or discharge  EAR/NOSE/MOUTH/THROAT: No sinus or throat pain; No difficulty hearing  NECK: No pain or stiffness  RESPIRATORY: No cough, wheezing, chills, or hemoptysis  CARDIOVASCULAR: No chest pain, palpitations, shortness of breath, or dyspnea on exertion  GASTROINTESTINAL: No abdominal pain, nausea, vomiting, hematemesis, diarrhea, or constipation  GENITOURINARY: No dysuria, frequency, hematuria, or incontinence  SKIN: No rashes or lesions  ENDOCRINE: No heat or cold intolerance  HEMATOLOGIC: No easy bruising or bleeding  PSYCHIATRIC: No depression, anxiety, or mood swings  MUSCULOSKELETAL: No joint pain or swelling  NEUROLOGICAL: As per HPI          OBJECTIVE:    Vital Signs Last 24 Hrs  T(C): 36.1 (09 Oct 2023 23:23), Max: 36.9 (09 Oct 2023 02:38)  T(F): 97 (09 Oct 2023 23:23), Max: 98.5 (09 Oct 2023 04:45)  HR: 62 (10 Oct 2023 01:00) (56 - 84)  BP: 183/99 (10 Oct 2023 01:00) (118/87 - 234/151)  BP(mean): 122 (10 Oct 2023 01:00) (92 - 138)  RR: 14 (10 Oct 2023 01:00) (11 - 22)  SpO2: 97% (10 Oct 2023 01:00) (95% - 100%)    Parameters below as of 09 Oct 2023 04:00  Patient On (Oxygen Delivery Method): room air        General Examination:  General: No acute distress  HEENT: Atraumatic, Normocephalic  Respiratory: CTA B/l.  No crackles, rhonchi, or wheezes.  Cardiovascular: RRR.  Normal S1 & S2.  Normal b/l radial and pedal pulses.    Neurological Examination:  General / Mental Status: AAO x 3.  No aphasia or dysarthria.  Naming and repetition intact.  Cranial Nerves: VFF x 4.  PERRL.  EOMI x 2, No nystagmus or diplopia.  B/l V1-V3 equal and intact to light touch and pinprick.  Symmetric facial movement and palate elevation.  B/l hearing equal to finger rub.  5/5 strength with b/l sternocleidomastoid and trapezius.  Midline tongue protrusion, with no atrophy or fasciculations.  Motor: Normal bulk & tone in all four extremities.  5/5 strength throughout all four extremities.  No downward drift, rigidity, spasticity, or tremors in any of the four extremities.  Sensory: Intact to light touch and pinprick in all four extremities.  Negative Romberg.  Reflex: 2+ and symmetric at b/l biceps, triceps, brachioradialis, patellae, and ankles.  Downgoing toes b/l.  Coordination: No dysmetria with b/l finger-to-nose and heel raise tests.  Symmetric rapid alternating movements b/l.  Gait: Normal, narrow-based gait.  No difficulty with tiptoe, heel, and tandem gaits.        LABORATORY VALUES:                        13.2   7.83  )-----------( 190      ( 09 Oct 2023 10:30 )             41.4       10-09    137  |  106  |  13  ----------------------------<  124<H>  3.8   |  27  |  0.90    Ca    9.4      09 Oct 2023 10:30  Phos  3.7     10-09  Mg     2.2     10-09    TPro  7.8  /  Alb  3.5  /  TBili  0.5  /  DBili  x   /  AST  12  /  ALT  16  /  AlkPhos  122<H>  10-09    10-09 Chol 216<H> <H> HDL 60 Trig 91    A1C with Estimated Average Glucose (10.09.23 @ 10:30)   A1C with Estimated Average Glucose Result: 5.9%  Estimated Average Glucose: 123 mg/dL          NEUROIMAGING:          Please contact the Neurology consult service with any neurological questions.    Finn Morgan MD   of Neurology  [facility] at [facility] NEUROLOGY CONSULT NOTE    NAME:  CINTIA PHIPPS      ASSESSMENT:  78 RHF with short-term memory loss and recent fall without loss of consciousness or seizures, concerning for syncopal event; also with chronic lacunar infarct and presenting with transient right-sided weakness, raising concern for transient ischemic attack      RECOMMENDATIONS:    - Monitor orthostatic BP & HR with each vital signs check    - Cardiovascular workup: Telemetry, Echocardiogram, Cardiothoracic surgery evaluation    - Plentiful fluid hydration (2 liters, or 8 glasses, of water daily) encouraged    - Patient counseled to maintain caution with rapid changes in position    - Maintain Atorvastatin 80mg PO QHS (goal LDL < 70 mg/dL) and add Aspirin 81mg PO Daily for secondary stroke prevention for chronic lacunar infarct    - No role for permissive hypertension - Goal /80, Treat BP > 140/90    - Maintain SBP > 100 mmHg    - PT/OT    - DVT ppx: SCDs, Enoxaparin (therapeutic anticoagulation not needed from a neurological perspective)          NOTE TO BE COMPLETED - PLEASE REFER TO ABOVE ONLY AND IGNORE INFORMATION BELOW    *******************************      CHIEF COMPLAINT:  Patient is a 78y old  Female who presents with a chief complaint of     HPI:  78 year old female with no known PMH presents after a fall. According to  at bedside, patient was found on the floor and was unable to move her right upper/lower extremities. Denies any LOC, seizure like activity, or urinary incontinence.  states that patient has been getting increasingly forgetful for the past few months, and has not seen a physician in over a year. Patient states she does not remember the fall, and currently has no complaints. Denies any headaches dizziness, fever, chills, visual changes, weakness, numbness, tingling chest pain, SOB, abdominal pain, or change in bowel habits.   In the ED:  Afebrile, /148, HR 85, 95% on RA  NIHHS 13  CTH/Angio showed old infarcts w/ 4mm ICA outpouch concerning for aneurysm  s/p labetalol 10mg IV + nicardipine gtt (09 Oct 2023 03:21)      NEURO HPI:      PAST MEDICAL & SURGICAL HISTORY:  No pertinent past medical history  No significant past surgical history      MEDICATIONS:  atorvastatin 80 milliGRAM(s) Oral at bedtime  chlorhexidine 2% Cloths 1 Application(s) Topical <User Schedule>  enoxaparin Injectable 40 milliGRAM(s) SubCutaneous every 24 hours  influenza  Vaccine (HIGH DOSE) 0.7 milliLiter(s) IntraMuscular once  labetalol 100 milliGRAM(s) Oral every 8 hours  labetalol Infusion 0.5 mG/Min IV Continuous <Continuous>  OLANZapine Injectable 2.5 milliGRAM(s) IntraMuscular once      ALLERGIES:  No Known Allergies      FAMILY HISTORY:        SOCIAL HISTORY:  Denies alcohol, tobacco, or illicit drug use      REVIEW OF SYSTEMS:  GENERAL: No fever, weight changes, fatigue  EYES: No eye pain or discharge  EAR/NOSE/MOUTH/THROAT: No sinus or throat pain; No difficulty hearing  NECK: No pain or stiffness  RESPIRATORY: No cough, wheezing, chills, or hemoptysis  CARDIOVASCULAR: No chest pain, palpitations, shortness of breath, or dyspnea on exertion  GASTROINTESTINAL: No abdominal pain, nausea, vomiting, hematemesis, diarrhea, or constipation  GENITOURINARY: No dysuria, frequency, hematuria, or incontinence  SKIN: No rashes or lesions  ENDOCRINE: No heat or cold intolerance  HEMATOLOGIC: No easy bruising or bleeding  PSYCHIATRIC: No depression, anxiety, or mood swings  MUSCULOSKELETAL: No joint pain or swelling  NEUROLOGICAL: As per HPI          OBJECTIVE:    Vital Signs Last 24 Hrs  T(C): 36.1 (09 Oct 2023 23:23), Max: 36.9 (09 Oct 2023 02:38)  T(F): 97 (09 Oct 2023 23:23), Max: 98.5 (09 Oct 2023 04:45)  HR: 62 (10 Oct 2023 01:00) (56 - 84)  BP: 183/99 (10 Oct 2023 01:00) (118/87 - 234/151)  BP(mean): 122 (10 Oct 2023 01:00) (92 - 138)  RR: 14 (10 Oct 2023 01:00) (11 - 22)  SpO2: 97% (10 Oct 2023 01:00) (95% - 100%)    Parameters below as of 09 Oct 2023 04:00  Patient On (Oxygen Delivery Method): room air        General Examination:  General: No acute distress  HEENT: Atraumatic, Normocephalic  Respiratory: CTA B/l.  No crackles, rhonchi, or wheezes.  Cardiovascular: RRR.  Normal S1 & S2.  Normal b/l radial and pedal pulses.    Neurological Examination:  General / Mental Status: AAO x 3.  No aphasia or dysarthria.  Naming and repetition intact.  Cranial Nerves: VFF x 4.  PERRL.  EOMI x 2, No nystagmus or diplopia.  B/l V1-V3 equal and intact to light touch and pinprick.  Symmetric facial movement and palate elevation.  B/l hearing equal to finger rub.  5/5 strength with b/l sternocleidomastoid and trapezius.  Midline tongue protrusion, with no atrophy or fasciculations.  Motor: Normal bulk & tone in all four extremities.  5/5 strength throughout all four extremities.  No downward drift, rigidity, spasticity, or tremors in any of the four extremities.  Sensory: Intact to light touch and pinprick in all four extremities.  Negative Romberg.  Reflex: 2+ and symmetric at b/l biceps, triceps, brachioradialis, patellae, and ankles.  Downgoing toes b/l.  Coordination: No dysmetria with b/l finger-to-nose and heel raise tests.  Symmetric rapid alternating movements b/l.  Gait: Normal, narrow-based gait.  No difficulty with tiptoe, heel, and tandem gaits.        LABORATORY VALUES:                        13.2   7.83  )-----------( 190      ( 09 Oct 2023 10:30 )             41.4       10-09    137  |  106  |  13  ----------------------------<  124<H>  3.8   |  27  |  0.90    Ca    9.4      09 Oct 2023 10:30  Phos  3.7     10-09  Mg     2.2     10-09    TPro  7.8  /  Alb  3.5  /  TBili  0.5  /  DBili  x   /  AST  12  /  ALT  16  /  AlkPhos  122<H>  10-09    10-09 Chol 216<H> <H> HDL 60 Trig 91    A1C with Estimated Average Glucose (10.09.23 @ 10:30)   A1C with Estimated Average Glucose Result: 5.9%  Estimated Average Glucose: 123 mg/dL          NEUROIMAGING:          Please contact the Neurology consult service with any neurological questions.    Finn Morgan MD   of Neurology  [facility] at [facility] NEUROLOGY CONSULT NOTE    NAME:  CINTIA PHIPPS      ASSESSMENT:  78 RHF with short-term memory loss and recent fall without loss of consciousness or seizures, concerning for syncopal event; also with chronic lacunar infarct and presenting with transient right-sided weakness, raising concern for transient ischemic attack      RECOMMENDATIONS:    - Monitor orthostatic BP & HR with each vital signs check    - Cardiovascular workup: Telemetry, Echocardiogram, Cardiothoracic surgery evaluation    - Plentiful fluid hydration (2 liters, or 8 glasses, of water daily) encouraged    - Patient counseled to maintain caution with rapid changes in position    - Maintain Atorvastatin 80mg PO QHS (goal LDL < 70 mg/dL) and add Aspirin 81mg PO Daily for secondary stroke prevention for chronic lacunar infarct    - No role for permissive hypertension - Goal /80, Treat BP > 140/90    - Maintain SBP > 100 mmHg    - PT/OT    - DVT ppx: SCDs, Enoxaparin (therapeutic anticoagulation not needed from a neurological perspective)          *******************************      CHIEF COMPLAINT:  Patient is a 78y old  Female who presents with a chief complaint of Fall    HPI:  78 year old female with no known PMH presents after a fall. According to  at bedside, patient was found on the floor and was unable to move her right upper/lower extremities. Denies any LOC, seizure like activity, or urinary incontinence.  states that patient has been getting increasingly forgetful for the past few months, and has not seen a physician in over a year. Patient states she does not remember the fall, and currently has no complaints. Denies any headaches dizziness, fever, chills, visual changes, weakness, numbness, tingling chest pain, SOB, abdominal pain, or change in bowel habits.   In the ED:  Afebrile, /148, HR 85, 95% on RA  NIHHS 13  CTH/Angio showed old infarcts w/ 4mm ICA outpouch concerning for aneurysm  s/p labetalol 10mg IV + nicardipine gtt (09 Oct 2023 03:21)      NEURO HPI:  78F with short-term memory loss over the past few months, who was brought to the Emergency Department after being found down on the floor with inability to move her right arm and right leg.      PAST MEDICAL & SURGICAL HISTORY:  Cognitive impairment      MEDICATIONS:  atorvastatin 80 milliGRAM(s) Oral at bedtime  chlorhexidine 2% Cloths 1 Application(s) Topical <User Schedule>  enoxaparin Injectable 40 milliGRAM(s) SubCutaneous every 24 hours  influenza  Vaccine (HIGH DOSE) 0.7 milliLiter(s) IntraMuscular once  labetalol 100 milliGRAM(s) Oral every 8 hours  labetalol Infusion 0.5 mG/Min IV Continuous <Continuous>  OLANZapine Injectable 2.5 milliGRAM(s) IntraMuscular once      ALLERGIES:  No Known Allergies      FAMILY HISTORY:  No reported family history of stroke      SOCIAL HISTORY:  No reported alcohol, tobacco, or illicit drug use      REVIEW OF SYSTEMS: Limited due to encephalopathy  GENERAL: No fever  EYES: No eye discharge  EAR/NOSE/MOUTH/THROAT: No sinus discharge  NECK: No stiffness  RESPIRATORY: No cough  CARDIOVASCULAR: No shortness of breath  GASTROINTESTINAL: No nausea, vomiting, hematemesis  GENITOURINARY: No frequency, hematuria  SKIN: No rashes or lesions  ENDOCRINE: No reported heat or cold intolerance  HEMATOLOGIC: No reported easy bruising or bleeding  PSYCHIATRIC: No known depression or anxiety  MUSCULOSKELETAL: No joint swelling  NEUROLOGICAL: As per HPI          OBJECTIVE:    Vital Signs Last 24 Hrs  T(C): 36.1 (09 Oct 2023 23:23), Max: 36.9 (09 Oct 2023 02:38)  T(F): 97 (09 Oct 2023 23:23), Max: 98.5 (09 Oct 2023 04:45)  HR: 62 (10 Oct 2023 01:00) (56 - 84)  BP: 183/99 (10 Oct 2023 01:00) (118/87 - 234/151)  BP(mean): 122 (10 Oct 2023 01:00) (92 - 138)  RR: 14 (10 Oct 2023 01:00) (11 - 22)  SpO2: 97% (10 Oct 2023 01:00) (95% - 100%)  Parameters below as of 09 Oct 2023 04:00  Patient On (Oxygen Delivery Method): room air      General Exam:  General: Somnolent, No apparent acute distress  Respiratory: Diminished breath sounds b/l  Cardiovascular: RRR, Full b/l radial and pedal pulses    Neurological Exam:  General / Mental Status: somnolent, Nonverbal, Does not follow commands.  Neurological exam is limited.  Cranial Nerves: PERRL, Blink to threat sluggish b/l, Does not track finger movements with eyes b/l.  Grimaces to pinprick at b/l V1-V3.  No response to snap at b/l ears.  No apparent facial asymmetry.  No resistance to passive motion of neck b/l; no nuchal rigidity.  Unable to assess palate elevation and tongue protrusion while patient is somnolent.  Motor: Diminished bulk and tone in all four extremities.  All four extremities drop to bed after passive elevation.  No spontaneous movement, rigidity, or spasticity in any of the four extremities.  Sensation: Weak grimace to nailbed pressure in all four extremities.  Reflexes: 1+ and symmetric at b/l biceps, triceps, brachioradialis, patellae, and ankles.  Toes mute b/l.  Unable to assess coordination, Romberg sign, or gait in minimally responsive patient.      NIHSS Score:    LOC - 2  LOC Questions - 2  LOC Commands - 2  Gaze Preference - 0  Visual Fields - 0  Facial Palsy - 0  Motor Arm Left - 4  Motor Arm Right - 4  Motor Leg Left - 4  Motor Leg Right - 4  Limb Ataxia - UN  Sensory - 0  Language - 3  Speech - UN  Extinction - UN    NIHSS Score Total: 25 - No IV TNK because of uncertain last seen normal time    Modified Krystyna Scale: 5          LABORATORY VALUES:                        13.2   7.83  )-----------( 190      ( 09 Oct 2023 10:30 )             41.4       10-09    137  |  106  |  13  ----------------------------<  124<H>  3.8   |  27  |  0.90    Ca    9.4      09 Oct 2023 10:30  Phos  3.7     10-09  Mg     2.2     10-09    TPro  7.8  /  Alb  3.5  /  TBili  0.5  /  DBili  x   /  AST  12  /  ALT  16  /  AlkPhos  122<H>  10-09    10-09 Chol 216<H> <H> HDL 60 Trig 91    A1C with Estimated Average Glucose (10.09.23 @ 10:30)   A1C with Estimated Average Glucose Result: 5.9%  Estimated Average Glucose: 123 mg/dL          NEUROIMAGING:      CT Head & CTA Head/Neck & CT Perfusion Head (10/9/23):  - No acute intracranial abnormality  - Chronic left frontal and right basal ganglia infarcts  - Chronic microvascular changes  - Mild diffuse atrophy  - No intracranial or neck large vessel cutoff  - Right supraclinoid ICA posteriorly projecting aneurysm  - No focal hypoperfusion          Please contact the Neurology consult service with any neurological questions.    Finn Morgan MD   of Neurology  [facility] at [facility]

## 2023-10-09 NOTE — H&P ADULT - NSHPPHYSICALEXAM_GEN_ALL_CORE
PHYSICAL EXAMINATION:  GENERAL: NAD,   HEAD:  Atraumatic, Normocephalic  EYES:  conjunctiva and sclera clear  NECK: Supple, No JVD, Normal thyroid  CHEST/LUNG: Clear to auscultation. Clear to percussion bilaterally; No rales, rhonchi, wheezing, or rubs  HEART: Regular rate and rhythm; No murmurs, rubs, or gallops  ABDOMEN: Soft, Nontender, Nondistended; Bowel sounds present  NERVOUS SYSTEM:  Alert & Oriented X3,  no focal deficits   EXTREMITIES:  2+ Peripheral Pulses, No clubbing, cyanosis, or edema  SKIN: warm dry

## 2023-10-09 NOTE — SWALLOW BEDSIDE ASSESSMENT ADULT - ORAL PHASE
Decreased anterior-posterior movement of the bolus/Delayed oral transit time Decreased anterior-posterior movement of the bolus/Delayed oral transit time/Stasis in anterior sulcus/Stasis in lateral sulci prolonged oral bolus holding 2/2 forgetfulness/Decreased anterior-posterior movement of the bolus/Delayed oral transit time prolonged oral bolus holding; pooling/Decreased anterior-posterior movement of the bolus/Delayed oral transit time Decreased anterior-posterior movement of the bolus/Delayed oral transit time/Stasis in lateral sulci

## 2023-10-09 NOTE — H&P ADULT - CONVERSATION DETAILS
An extensive goals of care conversation was held with patient and  including options, risks and benefits of many medical treatments including CPR, and intubation, Patient wants to remain FULL CODE.

## 2023-10-09 NOTE — H&P ADULT - ASSESSMENT
78 year old female with no known PMH presents after a fall and was found to have right sided weakness with NIHHS score of 13. Symptoms have now resolved and CTH was negative for any acute infarcts but did show a possible ICA aneurysm and   CT A/P showed a large thoracic aneurysm.  Patient was found to have HTN urgency and will be admitted to ICU for closer BP monitoring with nicardipine gtt.       =================== Neuro============================      ================= Cardiovascular==========================        ================- Pulm=================================      ==================ID===================================      ================= Nephro================================      =================GI====================================      ================ Heme==================================      =================Endocrine===============================      ================= Skin/Catheters============================  Peripheral IV lines   Kilpatrick catheter   Patient consented for A line and CVC     =================Prophylaxis =============================  DVT prophylaxis   GI prophylaxis     ==================GOC==================================  FULL CODE   Disposition      78 year old female with no known PMH presents after a fall and was found to have right sided weakness with NIHHS score of 13. Symptoms have now resolved and CTH was negative for any acute infarcts but did show a possible ICA aneurysm and   CT A/P showed a large thoracic aneurysm.  Patient was found to have HTN urgency and due to the concern of aneurysms will be admitted to ICU for closer BP monitoring with nicardipine gtt.       =================== Neuro============================      ================= Cardiovascular==========================        ================- Pulm=================================      ==================ID===================================      ================= Nephro================================      =================GI====================================      ================ Heme==================================      =================Endocrine===============================      ================= Skin/Catheters============================  Peripheral IV lines   Kilpatrick catheter   Patient consented for A line and CVC     =================Prophylaxis =============================  DVT prophylaxis   GI prophylaxis     ==================GOC==================================  FULL CODE   Disposition      78 year old female with no known PMH presents after a fall and was found to have right sided weakness with NIHHS score of 13. Symptoms have now resolved and CTH was negative for any acute infarcts but did show a possible ICA aneurysm and   CT A/P showed a large thoracic aneurysm.  Patient was found to have HTN urgency and due to the concern of aneurysms will be admitted to ICU for closer BP monitoring with nicardipine gtt.       =================== Neuro============================  # TIA  -p/w right sided weakness now  resolved   -NIHHS 13 on admission   -CTH showed chronic infarcts  -TNK not given due to out of window  -sherrill had TIA  -continue with neuro checks  -will need dysphagia screen before starting PO meds   -ECHO w/ bubble    #ICA Aneursym  -CTH shows 4 mm outpouching projecting posteriorly inferiorly off the right   supraclinoid internal carotid artery at its terminus, concerning for   aneurysm    ================= Cardiovascular==========================  # Hypertensive Urgency  - no hx of HTN  -p/w /110, asymptomatic  -s/p labetolol 10mg IV  - due to concern of large thoarcic aneyrysm will start nicadipine with systolic goal <180    # Thoarcic Anerysym  -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   -no aortic dissection  - Goal BP < 180  - Consider AC as pt may have had emboli stroke    ================- Pulm=================================  # No issues     ==================ID===================================  # No issues     ================= Nephro================================  # Renal Cyst  -asymptomactic   - 2.8 cm sized the right renal cyst      =================GI====================================  # NPO  dyspagia screen     ================ Heme==================================  #no issues     =================Endocrine===============================  # No issues     ================= Skin/Catheters============================  Peripheral IV lines     =================Prophylaxis =============================  DVT prophylaxis : SCDs for now   GI prophylaxis     ==================GOC==================================  FULL CODE   Disposition : ICU       78 year old female with no known PMH presents after a fall and was found to have right sided weakness with NIHHS score of 13. Symptoms have now resolved and CTH was negative for any acute infarcts but did show a possible ICA aneurysm and   CT A/P showed a large thoracic aneurysm.  Patient was found to have HTN urgency and due to the concern of aneurysms will be admitted to ICU for closer BP monitoring with nicardipine gtt.       =================== Neuro============================  # TIA  -p/w right sided weakness now  resolved   -NIHHS 13 on admission   -CTH showed chronic infarcts  -TNK not given due to out of window  likely had TIA  -continue with neuro checks  -will need dysphagia screen before starting PO meds   -ECHO w/ bubble    #ICA Aneurysm  -CTH shows 4 mm outpouching projecting posteriorly inferiorly off the right  supraclinoid internal carotid artery at its terminus, concerning for   aneurysm  -neuro checks per protocol  - consulted Neurology      ================= Cardiovascular==========================  # Hypertensive Urgency  - no hx of HTN  -p/w /110, asymptomatic  -s/p labetalol 10mg IV  - due to concern of large thoracic aneurysm will start nicardipine with systolic goal <180    # Thoracic Aneurysm  -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   -no aortic dissection  - Goal BP < 180   -Consult Vascular     # Mural Thrombi  -1 cm mural thrombi around the aneurysm  -if no surgical intervention for aneurysm, consider starting AC, as patient may have had embolic CVA    ================- Pulm=================================  # No issues     ==================ID===================================  # No issues     ================= Nephro================================  # Renal Cyst  asymptomatic   - 2.8 cm sized the right renal cyst      =================GI====================================  # NPO  dysphagia screen     ================ Heme==================================  #no issues     =================Endocrine===============================  # No issues     ================= Skin/Catheters============================  Peripheral IV lines     =================Prophylaxis =============================  DVT prophylaxis : SCDs for now   GI prophylaxis     ==================GOC==================================  FULL CODE   Disposition : ICU       78 year old female with no known PMH presents after a fall and was found to have right sided weakness with NIHHS score of 13. Symptoms have now resolved and CTH was negative for any acute infarcts but did show a possible ICA aneurysm and   CT A/P showed a large thoracic aneurysm.  Patient was found to have HTN urgency and due to the concern of aneurysms will be admitted to ICU for closer BP monitoring with nicardipine gtt.       #HTN Urgency  #TIA  #Thoracic aneurysm   #ICA Aneurysm          =================== Neuro============================  # TIA  -p/w right sided weakness now  resolved   -NIHHS 13 on admission   -CTH showed chronic infarcts  -TNK not given due to out of window  likely had TIA  -continue with neuro checks  -will need dysphagia screen before starting PO meds   -ECHO w/ bubble    #ICA Aneurysm  -CTH shows 4 mm outpouching projecting posteriorly inferiorly off the right  supraclinoid internal carotid artery at its terminus, concerning for   aneurysm  -neuro checks per protocol  - consulted Neurology      ================= Cardiovascular==========================  # Hypertensive Urgency  - no hx of HTN  -p/w /110, asymptomatic  -s/p labetalol 10mg IV  - due to concern of large thoracic aneurysm will start nicardipine with systolic goal <180    # Thoracic Aneurysm  -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   -no aortic dissection  - Goal BP < 180   -Consult Vascular     # Mural Thrombi  -1 cm mural thrombi around the aneurysm  -if no surgical intervention for aneurysm, consider starting AC, as patient may have had embolic CVA    ================- Pulm=================================  # No issues     ==================ID===================================  # No issues     ================= Nephro================================  # Renal Cyst  asymptomatic   - 2.8 cm sized the right renal cyst      =================GI====================================  # NPO  dysphagia screen     ================ Heme==================================  #no issues     =================Endocrine===============================  # No issues     ================= Skin/Catheters============================  Peripheral IV lines     =================Prophylaxis =============================  DVT prophylaxis : SCDs for now   GI prophylaxis     ==================GOC==================================  FULL CODE   Disposition : ICU       78 year old female with no known PMH presents after a fall and was found to have right sided weakness with NIHHS score of 13. Symptoms have now resolved and CTH was negative for any acute infarcts but did show a possible ICA aneurysm and   CT A/P showed a large thoracic aneurysm.  Patient was found to have HTN urgency and due to the concern of aneurysms will be admitted to ICU for closer BP monitoring with labetolol gtt.       #HTN Urgency  #TIA  #Thoracic aneurysm   #ICA Aneurysm          =================== Neuro============================  # TIA  -p/w right sided weakness now  resolved   -NIHHS 13 on admission   -CTH showed chronic infarcts  -TNK not given due to out of window  likely had TIA  -continue with neuro checks  -will need dysphagia screen before starting PO meds   -ECHO w/ bubble  -will target a lower BP goal of 140-160 due to enlarged Thoracic Aneurysm    #ICA Aneurysm  -CTH shows 4 mm outpouching projecting posteriorly inferiorly off the right  supraclinoid internal carotid artery at its terminus, concerning for   aneurysm  -neuro checks per protocol  - consulted Neurology      ================= Cardiovascular==========================  # Hypertensive Urgency  - no hx of HTN  -p/w /110, asymptomatic  -s/p labetalol 10mg IV  - will target a lower BP goal of 140-160 due to enlarged Thoracic Aneurysm  - c/w labetalol gtt     # Thoracic Aneurysm  -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   -no aortic dissection  - Goal -160   -Consult cardiothoracic surgery     # Mural Thrombi  -1 cm mural thrombi around the aneurysm  -if no surgical intervention for aneurysm, consider starting AC, as patient may have had embolic CVA    ================- Pulm=================================  # No issues     ==================ID===================================  # No issues     ================= Nephro================================  # Renal Cyst  asymptomatic   - 2.8 cm sized the right renal cyst      =================GI====================================  # NPO  dysphagia screen     ================ Heme==================================  #no issues     =================Endocrine===============================  # No issues     ================= Skin/Catheters============================  Peripheral IV lines     =================Prophylaxis =============================  DVT prophylaxis : SCDs for now   GI prophylaxis     ==================GOC==================================  FULL CODE   Disposition : ICU       78 year old female with no known PMH presents after a fall and was found to have right sided weakness with NIHHS score of 13. Symptoms have now resolved and CTH was negative for any acute infarcts but did show a possible ICA aneurysm and   CT A/P showed a large thoracic aneurysm.  Patient was found to have HTN urgency and due to the large size of the aneurysm,  will be admitted to ICU for closer BP monitoring with labetalol gtt.       #HTN Urgency  #TIA  #Thoracic aneurysm (6cm)  #ICA Aneurysm      =================== Neuro============================  # TIA  -p/w right sided weakness now  resolved   -NIHHS 13 on admission   -CTH showed chronic infarcts  -TNK not given due to out of window  likely had TIA  -continue with neuro checks  -will need dysphagia screen before starting PO meds   -ECHO w/ bubble  -will target a lower BP goal of 140-160 due to enlarged Thoracic Aneurysm    #ICA Aneurysm  -CTH shows 4 mm outpouching projecting posteriorly inferiorly off the right  supraclinoid internal carotid artery at its terminus, concerning for   aneurysm  -neuro checks per protocol  - consulted Neurology      ================= Cardiovascular==========================  # Hypertensive Urgency  - no hx of HTN  -p/w /110, asymptomatic  -s/p labetalol 10mg IV  - will target a lower BP goal of 140-160 due to enlarged Thoracic Aneurysm  - c/w labetalol gtt     # Thoracic Aneurysm  -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   -no aortic dissection  - Goal -160   -Consult cardiothoracic surgery     # Mural Thrombi  -1 cm mural thrombi around the aneurysm  -if no surgical intervention for aneurysm, consider starting AC, as patient may have had embolic CVA    ================- Pulm=================================  # No issues     ==================ID===================================  # No issues     ================= Nephro================================  # Renal Cyst  asymptomatic   - 2.8 cm sized the right renal cyst      =================GI====================================  # NPO  dysphagia screen     ================ Heme==================================  #no issues     =================Endocrine===============================  # No issues     ================= Skin/Catheters============================  Peripheral IV lines     =================Prophylaxis =============================  DVT prophylaxis : SCDs for now   GI prophylaxis     ==================GOC==================================  FULL CODE   Disposition : ICU       78 year old female with no known PMH presents after a fall and was found to have right sided weakness with NIHHS score of 13. Symptoms have now resolved and CTH was negative for any acute infarcts but did show a possible ICA aneurysm and   CT A/P showed a large thoracic aneurysm.  Patient was found to have HTN urgency and due to the large size of the aneurysm,  will be admitted to ICU for closer BP monitoring with labetalol gtt.       #HTN Urgency  #TIA  #Thoracic aneurysm (6cm)  #ICA Aneurysm      =================== Neuro============================  # TIA  -p/w right sided weakness now  resolved   -NIHHS 13 on admission   -CTH showed chronic infarcts  -TNK not given due to out of window  likely had TIA  -continue with neuro checks  -will need dysphagia screen before starting PO meds   -ECHO w/ bubble  -will target a lower BP goal of 140-160 due to enlarged Thoracic Aneurysm    #ICA Aneurysm  -CTH shows 4 mm outpouching projecting posteriorly inferiorly off the right  supraclinoid internal carotid artery at its terminus, concerning for   aneurysm  -neuro checks per protocol  - consulted Neurology      ================= Cardiovascular==========================  # Hypertensive Urgency  - no hx of HTN  -p/w /110, asymptomatic  - EKG NSR , trop x1 negative   -s/p labetalol 10mg IV  - will target a lower BP goal of 140-160 due to enlarged Thoracic Aneurysm  - c/w labetalol gtt     # Thoracic Aneurysm  -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   -no aortic dissection  - Goal -160   -Consult cardiothoracic surgery     # Mural Thrombi  -1 cm mural thrombi around the aneurysm  -if no surgical intervention for aneurysm, consider starting AC, as patient may have had embolic CVA    ================- Pulm=================================  # No issues     ==================ID===================================  # No issues     ================= Nephro================================  # Renal Cyst  asymptomatic   - 2.8 cm sized the right renal cyst      =================GI====================================  # NPO  dysphagia screen     ================ Heme==================================  #no issues     =================Endocrine===============================  # No issues     ================= Skin/Catheters============================  Peripheral IV lines     =================Prophylaxis =============================  DVT prophylaxis : SCDs for now   GI prophylaxis     ==================GOC==================================  FULL CODE   Disposition : ICU       78 year old female with no known PMH presents after a fall and was found to have right sided weakness with NIHHS score of 13. Symptoms have now resolved and CTH was negative for any acute infarcts but did show a possible ICA aneurysm and   CT A/P showed a large thoracic aneurysm.  Patient was found to have HTN urgency and due to the large size of the aneurysm,  will be admitted to ICU for closer BP monitoring with labetalol gtt.       #HTN Urgency  #TIA  #Thoracic aneurysm (6cm)  #ICA Aneurysm      =================== Neuro============================  # TIA  -p/w right sided weakness now  resolved   -NIHHS 13 on admission   -CTH showed chronic infarcts  -TNK not given due to out of window  likely had TIA  -continue with neuro checks  -will need dysphagia screen before starting PO meds   -ECHO w/ bubble  -will target a lower BP goal of 140-160 due to enlarged Thoracic Aneurysm    #ICA Aneurysm  -CTH shows 4 mm outpouching projecting posteriorly inferiorly off the right  supraclinoid internal carotid artery at its terminus, concerning for aneurysm  -neuro checks per protocol  - consult Neurology      ================= Cardiovascular==========================  # Hypertensive Urgency  - no hx of HTN  -p/w /110, asymptomatic  - EKG NSR , trop x1 negative   -s/p labetalol 10mg IV  - will target a lower BP goal of 140-160 due to enlarged Thoracic Aneurysm  - c/w labetalol gtt     # Thoracic Aneurysm  -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   -no evidence of aortic dissection  - Goal -160   -Consult cardiothoracic surgery     # Mural Thrombi  -1 cm mural thrombi around the aneurysm  -if no surgical intervention for aneurysm, consider starting AC, as patient may have had embolic CVA    ================- Pulm=================================  # No issues     ==================ID===================================  # No issues     ================= Nephro================================  # Renal Cyst  -asymptomatic   - incidentally found to have a 2.8 cm sized the right renal cyst      =================GI====================================  # NPO  dysphagia screen     ================ Heme==================================  #no issues     =================Endocrine===============================  # No issues     ================= Skin/Catheters============================  Peripheral IV lines     =================Prophylaxis =============================  DVT prophylaxis : SCDs for now   GI prophylaxis     ==================GOC==================================  FULL CODE   Disposition : ICU       78 year old female with no known PMH presents after a fall and was found to have right sided weakness with NIHHS score of 13. Symptoms have now resolved and CTH was negative for any acute infarcts but did show a possible ICA aneurysm and   CT A/P showed a large thoracic aneurysm.  Patient was found to have HTN urgency and due to the large size of the aneurysm,  will be admitted to ICU for closer BP monitoring with labetalol gtt.       #HTN Urgency  #TIA  #Thoracic aneurysm (6cm)  #ICA Aneurysm      =================== Neuro============================  # TIA  -p/w right sided weakness now  resolved   -NIHHS 13 on admission   -CTH showed chronic infarcts  -TNK not given due to out of window  likely had TIA  -continue with neuro checks  -will need dysphagia screen before starting PO meds   -ECHO w/ bubble, lipid panel, a1c  -will target a lower BP goal of 140-160 due to enlarged Thoracic Aneurysm    #ICA Aneurysm  -CTH shows 4 mm outpouching projecting posteriorly inferiorly off the right  supraclinoid internal carotid artery at its terminus, concerning for aneurysm  -neuro checks per protocol  - consult Neurology      ================= Cardiovascular==========================  # Hypertensive Urgency  - no hx of HTN  -p/w /110, asymptomatic  - EKG NSR , trop x1 negative   -s/p labetalol 10mg IV  - will target a lower BP goal of 140-160 due to enlarged Thoracic Aneurysm  - c/w labetalol gtt     # Thoracic Aneurysm  -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   -no evidence of aortic dissection  - Goal -160   -Consult cardiothoracic surgery     # Mural Thrombi  -1 cm mural thrombi around the aneurysm  -if no surgical intervention for aneurysm, consider starting AC, as patient may have had embolic CVA    ================- Pulm=================================  # No issues     ==================ID===================================  # No issues     ================= Nephro================================  # Renal Cyst  -asymptomatic   - incidentally found to have a 2.8 cm sized the right renal cyst      =================GI====================================  # NPO  dysphagia screen     ================ Heme==================================  #no issues     =================Endocrine===============================  # No issues     ================= Skin/Catheters============================  Peripheral IV lines     =================Prophylaxis =============================  DVT prophylaxis : SCDs for now   GI prophylaxis     ==================GOC==================================  FULL CODE   Disposition : ICU       78 year old female with no known PMH presents after a fall and was found to have right sided weakness with NIHHS score of 13. Symptoms have now resolved and CTH was negative for any acute infarcts but did show a possible ICA aneurysm and   CT A/P showed a large thoracic aneurysm.  Patient was found to have HTN urgency and due to the large size of the thoracic aneurysm,  will admit to ICU for closer BP monitoring with labetalol gtt.       #HTN Urgency  #TIA  #Thoracic aneurysm (6cm)  #ICA Aneurysm      =================== Neuro============================  # TIA  -p/w right sided weakness now  resolved   -NIHHS 13 on admission   -CTH showed chronic infarcts  -TNK not given due to out of window  likely had TIA  -continue with neuro checks  -will need dysphagia screen before starting PO meds   -ECHO w/ bubble, lipid panel, a1c  -will target a lower BP goal of 140-160 due to enlarged Thoracic Aneurysm    #ICA Aneurysm  -CTH shows 4 mm outpouching projecting posteriorly inferiorly off the right  supraclinoid internal carotid artery at its terminus, concerning for aneurysm  -neuro checks per protocol  - consult Neurology      ================= Cardiovascular==========================  # Hypertensive Urgency  - no hx of HTN  -p/w /110, asymptomatic  - EKG NSR , trop x1 negative   -s/p labetalol 10mg IV  - will target a lower BP goal of 140-160 due to enlarged Thoracic Aneurysm  - c/w labetalol gtt     # Thoracic Aneurysm  -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   -no evidence of aortic dissection  - Goal -160   -Consult cardiothoracic surgery     # Mural Thrombi  -1 cm mural thrombi around the aneurysm  -if no surgical intervention for aneurysm, consider starting AC, as patient may have had embolic CVA    ================- Pulm=================================  # No issues     ==================ID===================================  # No issues     ================= Nephro================================  # Renal Cyst  -asymptomatic   - incidentally found to have a 2.8 cm sized the right renal cyst      =================GI====================================  # NPO  dysphagia screen     ================ Heme==================================  #no issues     =================Endocrine===============================  # No issues     ================= Skin/Catheters============================  Peripheral IV lines     =================Prophylaxis =============================  DVT prophylaxis : SCDs for now   GI prophylaxis     ==================GOC==================================  FULL CODE   Disposition : ICU

## 2023-10-09 NOTE — SWALLOW BEDSIDE ASSESSMENT ADULT - SWALLOW EVAL: DIAGNOSIS
Pt p/w s&s oropharyngeal dysphagia c/b weak lip seal w/ anterior spillage (thin>thick), poor oral reception & grading(resulting in limited PO acceptance), decreased bolus formation, slow A-P transport w/ base-of-tongue pumping, delayed swallow trigger, & decreased hyolaryngeal elevation. While No overt s/s aspiration on  thin liquid trials, frequent fatigue caused spillage of all thin bolus out of mouth. Max cues needed to maintain alertness.

## 2023-10-09 NOTE — ED ADULT NURSE NOTE - NSFALLRISKINTERV_ED_ALL_ED
Assistance OOB with selected safe patient handling equipment if applicable/Assistance with ambulation/Communicate fall risk and risk factors to all staff, patient, and family/Monitor gait and stability/Monitor for mental status changes and reorient to person, place, and time, as needed/Provide visual cue: yellow wristband, yellow gown, etc/Reinforce activity limits and safety measures with patient and family/Toileting schedule using arm’s reach rule for commode and bathroom/Use of alarms - bed, stretcher, chair and/or video monitoring/Call bell, personal items and telephone in reach/Instruct patient to call for assistance before getting out of bed/chair/stretcher/Non-slip footwear applied when patient is off stretcher/[place] to call system/Physically safe environment - no spills, clutter or unnecessary equipment/Purposeful Proactive Rounding/Room/bathroom lighting operational, light cord in reach

## 2023-10-09 NOTE — SWALLOW BEDSIDE ASSESSMENT ADULT - SLP PERTINENT HISTORY OF CURRENT PROBLEM
78 year old female with no known PMH presents after a fall and was found to have right sided weakness with NIHHS score of 13. Symptoms have now resolved and CTH was negative for any acute infarcts but did show a possible ICA aneurysm and   CT A/P showed a large thoracic aneurysm.  Patient was found to have HTN urgency and due to the large size of the thoracic aneurysm,  will admit to ICU for closer BP monitoring

## 2023-10-09 NOTE — H&P ADULT - NSHPLABSRESULTS_GEN_ALL_CORE
LABS:                        12.6   8.32  )-----------( 199      ( 09 Oct 2023 00:18 )             39.7     10    140  |  103  |  15  ----------------------------<  114<H>  3.1<L>   |  30  |  0.90    Ca    9.6      09 Oct 2023 00:18    TPro  8.1  /  Alb  3.8  /  TBili  0.5  /  DBili  x   /  AST  14  /  ALT  15  /  AlkPhos  119  10-09    PT/INR - ( 09 Oct 2023 00:18 )   PT: 10.8 sec;   INR: 0.95 ratio         PTT - ( 09 Oct 2023 00:18 )  PTT:31.7 sec  Urinalysis Basic - ( 09 Oct 2023 01:26 )    Color: Yellow / Appearance: Cloudy / S.025 / pH: x  Gluc: x / Ketone: Negative mg/dL  / Bili: Negative / Urobili: 0.2 mg/dL   Blood: x / Protein: Negative mg/dL / Nitrite: Negative   Leuk Esterase: Moderate / RBC: 2 /HPF / WBC 12 /HPF   Sq Epi: x / Non Sq Epi: x / Bacteria: Few /HPF      LIVER FUNCTIONS - ( 09 Oct 2023 00:18 )  Alb: 3.8 g/dL / Pro: 8.1 g/dL / ALK PHOS: 119 U/L / ALT: 15 U/L DA / AST: 14 U/L / GGT: x

## 2023-10-09 NOTE — ED ADULT NURSE NOTE - OBJECTIVE STATEMENT
Rt arm weakness, as per  "pt was walking and her legs gave out" denies any head injury, last seen normal 1115pm. stroke code initiated. labs done.

## 2023-10-10 LAB
ALBUMIN SERPL ELPH-MCNC: 3.5 G/DL — SIGNIFICANT CHANGE UP (ref 3.5–5)
ALP SERPL-CCNC: 118 U/L — SIGNIFICANT CHANGE UP (ref 40–120)
ALT FLD-CCNC: 13 U/L DA — SIGNIFICANT CHANGE UP (ref 10–60)
ANION GAP SERPL CALC-SCNC: 6 MMOL/L — SIGNIFICANT CHANGE UP (ref 5–17)
APTT BLD: 30.4 SEC — SIGNIFICANT CHANGE UP (ref 24.5–35.6)
AST SERPL-CCNC: 15 U/L — SIGNIFICANT CHANGE UP (ref 10–40)
BASOPHILS # BLD AUTO: 0.02 K/UL — SIGNIFICANT CHANGE UP (ref 0–0.2)
BASOPHILS NFR BLD AUTO: 0.3 % — SIGNIFICANT CHANGE UP (ref 0–2)
BILIRUB SERPL-MCNC: 0.6 MG/DL — SIGNIFICANT CHANGE UP (ref 0.2–1.2)
BUN SERPL-MCNC: 19 MG/DL — HIGH (ref 7–18)
CALCIUM SERPL-MCNC: 9.7 MG/DL — SIGNIFICANT CHANGE UP (ref 8.4–10.5)
CHLORIDE SERPL-SCNC: 101 MMOL/L — SIGNIFICANT CHANGE UP (ref 96–108)
CO2 SERPL-SCNC: 32 MMOL/L — HIGH (ref 22–31)
CREAT SERPL-MCNC: 1.27 MG/DL — SIGNIFICANT CHANGE UP (ref 0.5–1.3)
EGFR: 44 ML/MIN/1.73M2 — LOW
EOSINOPHIL # BLD AUTO: 0.03 K/UL — SIGNIFICANT CHANGE UP (ref 0–0.5)
EOSINOPHIL NFR BLD AUTO: 0.4 % — SIGNIFICANT CHANGE UP (ref 0–6)
GLUCOSE SERPL-MCNC: 120 MG/DL — HIGH (ref 70–99)
HCT VFR BLD CALC: 40.1 % — SIGNIFICANT CHANGE UP (ref 34.5–45)
HCT VFR BLD CALC: 41 % — SIGNIFICANT CHANGE UP (ref 34.5–45)
HGB BLD-MCNC: 12.6 G/DL — SIGNIFICANT CHANGE UP (ref 11.5–15.5)
HGB BLD-MCNC: 12.8 G/DL — SIGNIFICANT CHANGE UP (ref 11.5–15.5)
IMM GRANULOCYTES NFR BLD AUTO: 0.4 % — SIGNIFICANT CHANGE UP (ref 0–0.9)
INR BLD: 0.97 RATIO — SIGNIFICANT CHANGE UP (ref 0.85–1.18)
LYMPHOCYTES # BLD AUTO: 1.57 K/UL — SIGNIFICANT CHANGE UP (ref 1–3.3)
LYMPHOCYTES # BLD AUTO: 21.2 % — SIGNIFICANT CHANGE UP (ref 13–44)
MAGNESIUM SERPL-MCNC: 2.3 MG/DL — SIGNIFICANT CHANGE UP (ref 1.6–2.6)
MCHC RBC-ENTMCNC: 27.5 PG — SIGNIFICANT CHANGE UP (ref 27–34)
MCHC RBC-ENTMCNC: 27.8 PG — SIGNIFICANT CHANGE UP (ref 27–34)
MCHC RBC-ENTMCNC: 31.2 GM/DL — LOW (ref 32–36)
MCHC RBC-ENTMCNC: 31.4 GM/DL — LOW (ref 32–36)
MCV RBC AUTO: 88 FL — SIGNIFICANT CHANGE UP (ref 80–100)
MCV RBC AUTO: 88.3 FL — SIGNIFICANT CHANGE UP (ref 80–100)
MONOCYTES # BLD AUTO: 0.45 K/UL — SIGNIFICANT CHANGE UP (ref 0–0.9)
MONOCYTES NFR BLD AUTO: 6.1 % — SIGNIFICANT CHANGE UP (ref 2–14)
MRSA PCR RESULT.: SIGNIFICANT CHANGE UP
NEUTROPHILS # BLD AUTO: 5.3 K/UL — SIGNIFICANT CHANGE UP (ref 1.8–7.4)
NEUTROPHILS NFR BLD AUTO: 71.6 % — SIGNIFICANT CHANGE UP (ref 43–77)
NRBC # BLD: 0 /100 WBCS — SIGNIFICANT CHANGE UP (ref 0–0)
NRBC # BLD: 0 /100 WBCS — SIGNIFICANT CHANGE UP (ref 0–0)
PHOSPHATE SERPL-MCNC: 4.4 MG/DL — SIGNIFICANT CHANGE UP (ref 2.5–4.5)
PLATELET # BLD AUTO: 194 K/UL — SIGNIFICANT CHANGE UP (ref 150–400)
PLATELET # BLD AUTO: 194 K/UL — SIGNIFICANT CHANGE UP (ref 150–400)
POTASSIUM SERPL-MCNC: 3.3 MMOL/L — LOW (ref 3.5–5.3)
POTASSIUM SERPL-SCNC: 3.3 MMOL/L — LOW (ref 3.5–5.3)
PROT SERPL-MCNC: 7.6 G/DL — SIGNIFICANT CHANGE UP (ref 6–8.3)
PROTHROM AB SERPL-ACNC: 11.1 SEC — SIGNIFICANT CHANGE UP (ref 9.5–13)
RBC # BLD: 4.54 M/UL — SIGNIFICANT CHANGE UP (ref 3.8–5.2)
RBC # BLD: 4.66 M/UL — SIGNIFICANT CHANGE UP (ref 3.8–5.2)
RBC # FLD: 13.2 % — SIGNIFICANT CHANGE UP (ref 10.3–14.5)
RBC # FLD: 13.2 % — SIGNIFICANT CHANGE UP (ref 10.3–14.5)
S AUREUS DNA NOSE QL NAA+PROBE: SIGNIFICANT CHANGE UP
SODIUM SERPL-SCNC: 139 MMOL/L — SIGNIFICANT CHANGE UP (ref 135–145)
WBC # BLD: 7.24 K/UL — SIGNIFICANT CHANGE UP (ref 3.8–10.5)
WBC # BLD: 7.4 K/UL — SIGNIFICANT CHANGE UP (ref 3.8–10.5)
WBC # FLD AUTO: 7.24 K/UL — SIGNIFICANT CHANGE UP (ref 3.8–10.5)
WBC # FLD AUTO: 7.4 K/UL — SIGNIFICANT CHANGE UP (ref 3.8–10.5)

## 2023-10-10 PROCEDURE — 99291 CRITICAL CARE FIRST HOUR: CPT

## 2023-10-10 RX ORDER — LABETALOL HCL 100 MG
10 TABLET ORAL ONCE
Refills: 0 | Status: COMPLETED | OUTPATIENT
Start: 2023-10-10 | End: 2023-10-10

## 2023-10-10 RX ORDER — OLANZAPINE 15 MG/1
2.5 TABLET, FILM COATED ORAL ONCE
Refills: 0 | Status: COMPLETED | OUTPATIENT
Start: 2023-10-10 | End: 2023-10-10

## 2023-10-10 RX ORDER — LABETALOL HCL 100 MG
100 TABLET ORAL EVERY 12 HOURS
Refills: 0 | Status: DISCONTINUED | OUTPATIENT
Start: 2023-10-10 | End: 2023-10-13

## 2023-10-10 RX ORDER — LABETALOL HCL 100 MG
100 TABLET ORAL EVERY 12 HOURS
Refills: 0 | Status: DISCONTINUED | OUTPATIENT
Start: 2023-10-10 | End: 2023-10-10

## 2023-10-10 RX ORDER — AMLODIPINE BESYLATE 2.5 MG/1
5 TABLET ORAL DAILY
Refills: 0 | Status: DISCONTINUED | OUTPATIENT
Start: 2023-10-10 | End: 2023-10-10

## 2023-10-10 RX ORDER — ASPIRIN/CALCIUM CARB/MAGNESIUM 324 MG
81 TABLET ORAL DAILY
Refills: 0 | Status: DISCONTINUED | OUTPATIENT
Start: 2023-10-10 | End: 2023-10-11

## 2023-10-10 RX ORDER — POTASSIUM CHLORIDE 20 MEQ
40 PACKET (EA) ORAL ONCE
Refills: 0 | Status: COMPLETED | OUTPATIENT
Start: 2023-10-10 | End: 2023-10-10

## 2023-10-10 RX ADMIN — ATORVASTATIN CALCIUM 80 MILLIGRAM(S): 80 TABLET, FILM COATED ORAL at 21:54

## 2023-10-10 RX ADMIN — Medication 30 MG/MIN: at 00:15

## 2023-10-10 RX ADMIN — AMLODIPINE BESYLATE 5 MILLIGRAM(S): 2.5 TABLET ORAL at 10:36

## 2023-10-10 RX ADMIN — Medication 81 MILLIGRAM(S): at 12:59

## 2023-10-10 RX ADMIN — Medication 30 MG/MIN: at 03:00

## 2023-10-10 RX ADMIN — Medication 40 MILLIEQUIVALENT(S): at 06:56

## 2023-10-10 RX ADMIN — Medication 10 MILLIGRAM(S): at 16:48

## 2023-10-10 RX ADMIN — CHLORHEXIDINE GLUCONATE 1 APPLICATION(S): 213 SOLUTION TOPICAL at 06:29

## 2023-10-10 NOTE — PHYSICAL THERAPY INITIAL EVALUATION ADULT - MODIFIED CLINICAL TEST OF SENSORY INTEGRATION IN BALANCE TEST
Postural Assessment Stroke Scale Total Score (PASS): 9/36 Total; (Maintain Posture Subscale = 3/15)+(Change Posture Subscale = 6/21): Lower scores equates to greater impairment in postural control.

## 2023-10-10 NOTE — CONSULT NOTE ADULT - ASSESSMENT
78F found down following CVA w/right-sided focal deficits and subsequently found to have >6cm aneurysm of her descending thoracic aorta. Calcific disease w/mural thrombus. No signs of dissection.          - No signs of acute or chronic type B aortic dissection. No signs of malperfusion. No surgical intervention indicated.     - Descending thoracic aorta has chronic degenerative changes with mural thrombus and atherosclerotic disease. Retrograde flow of mural thrombus causing her stroke is extremely unlikely. No reason for anticoagulation for descending thoracic aortic aneurysm or mural thrombus. Patient can follow up with me in the office within several weeks of her discharge from the hospital or rehab.     - Thank you for the consult and please call with any questions or concerns.       Enrrique Brooks MD  Cardiac Surgery

## 2023-10-10 NOTE — PHYSICAL THERAPY INITIAL EVALUATION ADULT - PATIENT PROFILE REVIEW, REHAB EVAL
including labs and imaging/yes including labs and imaging. Case was discussed with medical team PT advised pt appropriate for consult./yes

## 2023-10-10 NOTE — CONSULT NOTE ADULT - SUBJECTIVE AND OBJECTIVE BOX
Cardiac Surgery  CC: descending thoracic aortic aneurysm   HPI: 78F found down following CVA w/right-sided focal deficits and subsequently found to have >6cm aneurysm of her descending thoracic aorta. Calcific disease w/mural thrombus. No signs of dissection.        Medical and Surgical History  No pertinent past medical history  No significant past surgical history      Review of Systems:   [x] Not physically present to perform review of systems or physical exam.      Objective:   Vital Signs Last 24 Hrs  T(C): 36.8 (10 Oct 2023 07:00), Max: 36.8 (10 Oct 2023 06:00)  T(F): 98.3 (10 Oct 2023 07:00), Max: 98.3 (10 Oct 2023 06:00)  HR: 59 (10 Oct 2023 14:10) (48 - 69)  BP: 145/94 (10 Oct 2023 14:10) (118/78 - 192/116)  BP(mean): 111 (10 Oct 2023 14:10) (86 - 138)  RR: 13 (10 Oct 2023 14:10) (8 - 20)  SpO2: 100% (10 Oct 2023 14:10) (95% - 100%)           LABS:                        12.8   7.40  )-----------( 194      ( 10 Oct 2023 06:00 )             41.0     10-10    139  |  101  |  19<H>  ----------------------------<  120<H>  3.3<L>   |  32<H>  |  1.27    Ca    9.7      10 Oct 2023 06:00  Phos  4.4     10-10  Mg     2.3     10-10    TPro  7.6  /  Alb  3.5  /  TBili  0.6  /  DBili  x   /  AST  15  /  ALT  13  /  AlkPhos  118  10-10    PT/INR - ( 10 Oct 2023 06:00 )   PT: 11.1 sec;   INR: 0.97 ratio         PTT - ( 10 Oct 2023 06:00 )  PTT:30.4 sec  Urinalysis Basic - ( 10 Oct 2023 06:00 )    Color: x / Appearance: x / SG: x / pH: x  Gluc: 120 mg/dL / Ketone: x  / Bili: x / Urobili: x   Blood: x / Protein: x / Nitrite: x   Leuk Esterase: x / RBC: x / WBC x   Sq Epi: x / Non Sq Epi: x / Bacteria: x

## 2023-10-10 NOTE — PHYSICAL THERAPY INITIAL EVALUATION ADULT - DIAGNOSIS, PT EVAL
(ICF Model) Pt. present w/deficits in Body Structures/Function (Impairments), incl: Strength, Balance, tone, cognition, ROM, leading to deficits in performing the below noted Activities (Limitations).

## 2023-10-10 NOTE — PROGRESS NOTE ADULT - ASSESSMENT
78 year old female with no known PMH presents after a fall and was found to have right sided weakness with NIHHS score of 13. Symptoms have now resolved and CTH was negative for any acute infarcts but did show a possible ICA aneurysm and   CT A/P showed a large thoracic aneurysm.  Patient was found to have HTN urgency and due to the large size of the thoracic aneurysm,  will admit to ICU for closer BP monitoring with labetalol gtt.     #HTN Urgency  #TIA  #Thoracic aneurysm (6cm)  #ICA Aneurysm      Neuro  # CVA unknown last known well (NIHHS 13 on admission) wit right sided deficits  - CTH 10/8 with no acute ischemia or hemorrhage. 4 mm outpouching projecting posteriorly inferiorly off the right supraclinoid internal carotid artery at its terminus, concerning for aneurysm.  -TNK not given due to out of window  - continue with neuro checks  - Official SLP done and patient with concern for oropharyngeal dysphagia started on puree diet with mildly thick liquids.   - pending ECHO w/ bubble for stroke work-up  - pending repeat imaging    #ICA Aneurysm  - CTH shows 4 mm outpouching projecting posteriorly inferiorly off the right  supraclinoid internal carotid artery at its terminus, concerning for aneurysm  -neuro checks per protocol  - Neurology, Dr. Briseno consulted and following    Cardiovascular  # Hypertensive Emergency ('s)  # 6mm Descending Aortic Aneurysm   #Hyperlipidemia  - EKG NSR , trop x1 negative   - s/p labetalol 10mg IV  - Will target a lower BP goal of 140-160 due to enlarged Thoracic Aneurysm  - Started Labetalol 100mg TID and continue labetalol gtt until pressure is within range  - Atorvastatin 80mg qd    # Thoracic Aneurysm  -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   - no evidence of aortic dissection  - Goal -160   - CT Surgery consulted and not offering any acute surgical interventions. Dr. Brooks to place official recs. Per our conversation, heparin gtt to be discontinued given possible chronicity of aneurysm and mural thrombi.     # Mural Thrombi  -1 cm mural thrombi around the aneurysm  - if no surgical intervention for aneurysm, consider starting AC, as patient may have had embolic CVA  - Per Neurology, mural thrombi likely not the nidus for CVA given location.     Pulm  # No issues     ID  # No issues     Nephro  # Renal Cyst  - asymptomatic   - incidentally found to have a 2.8 cm sized the right renal cyst    GI  #Dysphagia  -  Pureed diet and moderately thickened liquids, seen by SLP.     Heme  #Per neuro, ok to start DVT ppx.      Endocrine  # No issues   - f/u A1C    Skin/Catheters  - Peripheral IV lines     Prophylaxis   - DVT prophylaxis : Lovenox   - SCDs    GOC  FULL CODE   Disposition : ICU       78 year old female with no known PMH presents after a fall and was found to have right sided weakness with NIHHS score of 13. Symptoms have now resolved and CTH was negative for any acute infarcts but did show a possible ICA aneurysm and   CT A/P showed a large thoracic aneurysm.  Patient was found to have HTN urgency and due to the large size of the thoracic aneurysm,  will admit to ICU for closer BP monitoring with labetalol gtt.     #HTN Urgency  #TIA  #Thoracic aneurysm (6cm)  #ICA Aneurysm    Neuro  # CVA unknown last known well (NIHHS 13 on admission) wit right sided deficits  - CTH 10/8 with no acute ischemia or hemorrhage. 4 mm outpouching projecting posteriorly inferiorly off the right supraclinoid internal carotid artery at its terminus, concerning for aneurysm.  -TNK not given due to out of window  - continue with neuro checks  - Official SLP done and patient with concern for oropharyngeal dysphagia started on puree diet with mildly thick liquids.   - pending ECHO w/ bubble for stroke work-up  - pending repeat imaging    #ICA Aneurysm  - CTH shows 4 mm outpouching projecting posteriorly inferiorly off the right  supraclinoid internal carotid artery at its terminus, concerning for aneurysm  -neuro checks per protocol  - Neurology, Dr. Briseno consulted and following.    Cardiovascular  # Hypertensive Emergency ('s)  # 6mm Descending Aortic Aneurysm   #Hyperlipidemia  - EKG NSR , trop x1 negative   - s/p labetalol 10mg IV  - Per Neuro, no role for permissive hypertension - Goal /80, Treat BP > 140/90  - Changed Labetalol 100mg BID with hold parameters  - Added Amlodipine 5mg qd  - Atorvastatin 80mg qd    # Thoracic Aneurysm  -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   - no evidence of aortic dissection  - Goal /80 per Neuro  - CT Surgery consulted and not offering any acute surgical interventions. Dr. Brooks to place official recs. Per our conversation, heparin gtt to be discontinued given possible chronicity of aneurysm and mural thrombi. Requesting 2 month outpatient follow-up with CT surg.     # Mural Thrombi  -1 cm mural thrombi around the aneurysm  - if no surgical intervention for aneurysm, consider starting AC, as patient may have had embolic CVA  - Per Neurology, mural thrombi likely not the nidus for CVA given location.     Pulm  # No issues     ID  # No issues     Nephro  # Renal Cyst  - asymptomatic   - incidentally found to have a 2.8 cm sized the right renal cyst    GI  #Dysphagia  -  Pureed diet and moderately thickened liquids, seen by SLP.     Heme  #Per neuro, ok to start DVT ppx.      Endocrine  # Pre-Diabetes (A1C 5.9)  - Will monitor serum glucose    Skin/Catheters  - Peripheral IV lines     Prophylaxis   - DVT prophylaxis : Lovenox   - SCDs    [facility]  FULL CODE   Disposition : DG to tele       78 year old female with no known PMH presents after a fall and was found to have right sided weakness with NIHHS score of 13. Symptoms have now resolved and CTH was negative for any acute infarcts but did show a possible ICA aneurysm and   CT A/P showed a large thoracic aneurysm.  Patient was found to have HTN urgency and due to the large size of the thoracic aneurysm,  will admit to ICU for closer BP monitoring with labetalol gtt.     #HTN Urgency  #TIA  #Thoracic aneurysm (6cm)  #ICA Aneurysm    Neuro  # CVA unknown last known well (NIHHS 13 on admission) wit right sided deficits  - CTH 10/8 with no acute ischemia or hemorrhage. 4 mm outpouching projecting posteriorly inferiorly off the right supraclinoid internal carotid artery at its terminus, concerning for aneurysm.  -TNK not given due to out of window  - continue with neuro checks  - Official SLP done and patient with concern for oropharyngeal dysphagia started on puree diet with mildly thick liquids.   - pending ECHO w/ bubble for stroke work-up  - pending repeat imaging    #ICA Aneurysm  - CTH shows 4 mm outpouching projecting posteriorly inferiorly off the right  supraclinoid internal carotid artery at its terminus, concerning for aneurysm  -neuro checks per protocol  - Neurology, Dr. Briseno consulted and following.    Cardiovascular  # Hypertensive Emergency ('s)  # 6mm Descending Aortic Aneurysm   #Hyperlipidemia  - EKG NSR , trop x1 negative   - s/p labetalol 10mg IV  - Per Neuro, no role for permissive hypertension - Goal /80, Treat BP > 140/90  - Changed Labetalol 100mg BID with hold parameters  - Added Amlodipine 5mg qd  - Atorvastatin 80mg qd  - Started ASA 81mg per neuro    # Thoracic Aneurysm  -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   - no evidence of aortic dissection  - Goal /80 per Neuro  - CT Surgery consulted and not offering any acute surgical interventions. Dr. Brooks to place official recs. Per our conversation, heparin gtt to be discontinued given possible chronicity of aneurysm and mural thrombi. Requesting 2 month outpatient follow-up with CT surg.     # Mural Thrombi  -1 cm mural thrombi around the aneurysm  - if no surgical intervention for aneurysm, consider starting AC, as patient may have had embolic CVA  - Per Neurology, mural thrombi likely not the nidus for CVA given location.     Pulm  # No issues     ID  # No issues     Nephro  # Renal Cyst  - asymptomatic   - incidentally found to have a 2.8 cm sized the right renal cyst    GI  #Dysphagia  -  Pureed diet and moderately thickened liquids, seen by SLP.     Heme  #Per neuro, ok to start DVT ppx.      Endocrine  # Pre-Diabetes (A1C 5.9)  - Will monitor serum glucose    Skin/Catheters  - Peripheral IV lines     Prophylaxis   - DVT prophylaxis : Lovenox   - SCDs    [facility]  FULL CODE   Disposition : DG to tele

## 2023-10-10 NOTE — PHYSICAL THERAPY INITIAL EVALUATION ADULT - NSPTDISCHREC_GEN_A_CORE
Inpt. rehab, level TBD pending further assessment of pt. cognitive and functional ability Inpt. rehab, level TBD pending further assessment of pt. cognitive and functional ability.

## 2023-10-10 NOTE — PROGRESS NOTE ADULT - SUBJECTIVE AND OBJECTIVE BOX
INTERVAL HPI/OVERNIGHT EVENTS:       PRESSORS: [ ] YES [ ] NO  WHICH:    ANTIBIOTICS:                  DATE STARTED:  ANTIBIOTICS:                  DATE STARTED:    Antimicrobial:    Cardiovascular:  labetalol 100 milliGRAM(s) Oral every 8 hours  labetalol Infusion 0.5 mG/Min IV Continuous <Continuous>    Pulmonary:    Hematalogic:  aspirin  chewable 81 milliGRAM(s) Oral daily  enoxaparin Injectable 40 milliGRAM(s) SubCutaneous every 24 hours    Other:  atorvastatin 80 milliGRAM(s) Oral at bedtime  chlorhexidine 2% Cloths 1 Application(s) Topical <User Schedule>  influenza  Vaccine (HIGH DOSE) 0.7 milliLiter(s) IntraMuscular once    aspirin  chewable 81 milliGRAM(s) Oral daily  atorvastatin 80 milliGRAM(s) Oral at bedtime  chlorhexidine 2% Cloths 1 Application(s) Topical <User Schedule>  enoxaparin Injectable 40 milliGRAM(s) SubCutaneous every 24 hours  influenza  Vaccine (HIGH DOSE) 0.7 milliLiter(s) IntraMuscular once  labetalol 100 milliGRAM(s) Oral every 8 hours  labetalol Infusion 0.5 mG/Min IV Continuous <Continuous>    Drug Dosing Weight  Height (cm): 149.9 (09 Oct 2023 00:21)  Weight (kg): 52 (09 Oct 2023 07:00)  BMI (kg/m2): 23.1 (09 Oct 2023 07:00)  BSA (m2): 1.46 (09 Oct 2023 07:00)    PHYSICAL EXAM:    LINES/DRAINS/DEVICES  CENTRAL LINE: [ ] YES [ ] NO  LOCATION:     MENESES: [ ] YES [ ] NO     A-LINE:  [ ] YES [ ] NO  LOCATION:       ICU Vital Signs Last 24 Hrs  T(C): 36.8 (10 Oct 2023 07:00), Max: 36.8 (10 Oct 2023 06:00)  T(F): 98.3 (10 Oct 2023 07:00), Max: 98.3 (10 Oct 2023 06:00)  HR: 49 (10 Oct 2023 07:00) (48 - 72)  BP: 127/68 (10 Oct 2023 07:00) (118/78 - 192/116)  BP(mean): 86 (10 Oct 2023 07:00) (86 - 138)  ABP: --  ABP(mean): --  RR: 10 (10 Oct 2023 07:00) (8 - 22)  SpO2: 98% (10 Oct 2023 07:00) (95% - 100%)              10-09 @ 07:01  -  10-10 @ 07:00  --------------------------------------------------------  IN: 1020 mL / OUT: 800 mL / NET: 220 mL              LABS:  CBC Full  -  ( 10 Oct 2023 06:00 )  WBC Count : 7.40 K/uL  RBC Count : 4.66 M/uL  Hemoglobin : 12.8 g/dL  Hematocrit : 41.0 %  Platelet Count - Automated : 194 K/uL  Mean Cell Volume : 88.0 fl  Mean Cell Hemoglobin : 27.5 pg  Mean Cell Hemoglobin Concentration : 31.2 gm/dL  Auto Neutrophil # : 5.30 K/uL  Auto Lymphocyte # : 1.57 K/uL  Auto Monocyte # : 0.45 K/uL  Auto Eosinophil # : 0.03 K/uL  Auto Basophil # : 0.02 K/uL  Auto Neutrophil % : 71.6 %  Auto Lymphocyte % : 21.2 %  Auto Monocyte % : 6.1 %  Auto Eosinophil % : 0.4 %  Auto Basophil % : 0.3 %    10-10    139  |  101  |  19<H>  ----------------------------<  120<H>  3.3<L>   |  32<H>  |  1.27    Ca    9.7      10 Oct 2023 06:00  Phos  4.4     10-10  Mg     2.3     10-10    TPro  7.6  /  Alb  3.5  /  TBili  0.6  /  DBili  x   /  AST  15  /  ALT  13  /  AlkPhos  118  10-10    PT/INR - ( 10 Oct 2023 06:00 )   PT: 11.1 sec;   INR: 0.97 ratio         PTT - ( 10 Oct 2023 06:00 )  PTT:30.4 sec  Urinalysis Basic - ( 10 Oct 2023 06:00 )    Color: x / Appearance: x / SG: x / pH: x  Gluc: 120 mg/dL / Ketone: x  / Bili: x / Urobili: x   Blood: x / Protein: x / Nitrite: x   Leuk Esterase: x / RBC: x / WBC x   Sq Epi: x / Non Sq Epi: x / Bacteria: x          RADIOLOGY & ADDITIONAL STUDIES REVIEWED DURING TEAM ROUNDS    [ ]GOALS OF CARE DISCUSSION WITH PATIENT/FAMILY/PROXY:    CRITICAL CARE TIME SPENT: 35 minutes   INTERVAL HPI/OVERNIGHT EVENTS:   - Overnight patient agitated and pulling out IV lines so given 1x dose of Zyprexa IM. Pt assessed at bedside, is lethargic but arousable to voice and following simple commands.     Antimicrobial:    Cardiovascular:  labetalol 100 milliGRAM(s) Oral every 8 hours  labetalol Infusion 0.5 mG/Min IV Continuous <Continuous>    Pulmonary:    Hematalogic:  aspirin  chewable 81 milliGRAM(s) Oral daily  enoxaparin Injectable 40 milliGRAM(s) SubCutaneous every 24 hours    Other:  atorvastatin 80 milliGRAM(s) Oral at bedtime  chlorhexidine 2% Cloths 1 Application(s) Topical <User Schedule>  influenza  Vaccine (HIGH DOSE) 0.7 milliLiter(s) IntraMuscular once    aspirin  chewable 81 milliGRAM(s) Oral daily  atorvastatin 80 milliGRAM(s) Oral at bedtime  chlorhexidine 2% Cloths 1 Application(s) Topical <User Schedule>  enoxaparin Injectable 40 milliGRAM(s) SubCutaneous every 24 hours  influenza  Vaccine (HIGH DOSE) 0.7 milliLiter(s) IntraMuscular once  labetalol 100 milliGRAM(s) Oral every 8 hours  labetalol Infusion 0.5 mG/Min IV Continuous <Continuous>    Drug Dosing Weight  Height (cm): 149.9 (09 Oct 2023 00:21)  Weight (kg): 52 (09 Oct 2023 07:00)  BMI (kg/m2): 23.1 (09 Oct 2023 07:00)  BSA (m2): 1.46 (09 Oct 2023 07:00)    PHYSICAL EXAM:  General: Patient well appearing, supine in bed, lethargic, following simple commands.   HEENT: Airway patent, moist mucous membranes. PERRL.   Cardiac: RRR S1/S2 with strong peripheral pulses  Respiratory: Lung sounds clear to auscultation bilaterally.    GI: abdomen soft, non tender, non distended  Neuro: A&O x1 (person). R facial droop, RUE 2/5, RLE 2/5, LUE 4/5, LLE 3/5. Sensation to all four extremities. Dysarthric  Skin: warm, well perfused.  Psych: normal mood and affect    LINES/DRAINS/DEVICES  CENTRAL LINE: [ ] YES [X] NO  LOCATION:     MENESES: [ ] YES [X] NO     A-LINE:  [ ] YES [X] NO  LOCATION:       ICU Vital Signs Last 24 Hrs  T(C): 36.8 (10 Oct 2023 07:00), Max: 36.8 (10 Oct 2023 06:00)  T(F): 98.3 (10 Oct 2023 07:00), Max: 98.3 (10 Oct 2023 06:00)  HR: 49 (10 Oct 2023 07:00) (48 - 72)  BP: 127/68 (10 Oct 2023 07:00) (118/78 - 192/116)  BP(mean): 86 (10 Oct 2023 07:00) (86 - 138)  ABP: --  ABP(mean): --  RR: 10 (10 Oct 2023 07:00) (8 - 22)  SpO2: 98% (10 Oct 2023 07:00) (95% - 100%)      10-09 @ 07:01  -  10-10 @ 07:00  --------------------------------------------------------  IN: 1020 mL / OUT: 800 mL / NET: 220 mL      LABS:  CBC Full  -  ( 10 Oct 2023 06:00 )  WBC Count : 7.40 K/uL  RBC Count : 4.66 M/uL  Hemoglobin : 12.8 g/dL  Hematocrit : 41.0 %  Platelet Count - Automated : 194 K/uL  Mean Cell Volume : 88.0 fl  Mean Cell Hemoglobin : 27.5 pg  Mean Cell Hemoglobin Concentration : 31.2 gm/dL  Auto Neutrophil # : 5.30 K/uL  Auto Lymphocyte # : 1.57 K/uL  Auto Monocyte # : 0.45 K/uL  Auto Eosinophil # : 0.03 K/uL  Auto Basophil # : 0.02 K/uL  Auto Neutrophil % : 71.6 %  Auto Lymphocyte % : 21.2 %  Auto Monocyte % : 6.1 %  Auto Eosinophil % : 0.4 %  Auto Basophil % : 0.3 %    10-10    139  |  101  |  19<H>  ----------------------------<  120<H>  3.3<L>   |  32<H>  |  1.27    Ca    9.7      10 Oct 2023 06:00  Phos  4.4     10-10  Mg     2.3     10-10    TPro  7.6  /  Alb  3.5  /  TBili  0.6  /  DBili  x   /  AST  15  /  ALT  13  /  AlkPhos  118  10-10    PT/INR - ( 10 Oct 2023 06:00 )   PT: 11.1 sec;   INR: 0.97 ratio         PTT - ( 10 Oct 2023 06:00 )  PTT:30.4 sec  Urinalysis Basic - ( 10 Oct 2023 06:00 )    Color: x / Appearance: x / SG: x / pH: x  Gluc: 120 mg/dL / Ketone: x  / Bili: x / Urobili: x   Blood: x / Protein: x / Nitrite: x   Leuk Esterase: x / RBC: x / WBC x   Sq Epi: x / Non Sq Epi: x / Bacteria: x      RADIOLOGY & ADDITIONAL STUDIES REVIEWED DURING TEAM ROUNDS

## 2023-10-10 NOTE — PROGRESS NOTE ADULT - CRITICAL CARE ATTENDING COMMENT
I counseled the primary team about the medications to maintain for secondary stroke prevention in this patient with multiple chronic ischemic strokes.

## 2023-10-10 NOTE — PHYSICAL THERAPY INITIAL EVALUATION ADULT - TRANSFER SAFETY CONCERNS NOTED: SIT/STAND, REHAB EVAL
Pt. very nervous regarding mobility and ptentially falling, with encouragement and close PT support pt. agreed to attempt stand./decreased safety awareness/losing balance/decreased weight-shifting ability

## 2023-10-10 NOTE — PHYSICAL THERAPY INITIAL EVALUATION ADULT - FUNCTIONAL LIMITATIONS, PT EVAL
MODIFIED GUEVARA SCALE:   5: Severe Disability:  Bedridden, incontinent and requiring constant nursing care and attention./self-care/home management/community/leisure

## 2023-10-10 NOTE — PHYSICAL THERAPY INITIAL EVALUATION ADULT - ADDITIONAL COMMENTS
HOWEVER, per spouse, pt. in last ~2 weeks prior to admission had increasing imbalance and was holding onto walls with ambulation. Recently owns straight cane and rolling walker, but no using.  -- Pt. is RT hand dominant.  -- per spouse pt. speech is hypophonic, but clear as at her baseline

## 2023-10-10 NOTE — PHYSICAL THERAPY INITIAL EVALUATION ADULT - IMPAIRMENTS FOUND, PT EVAL
cognitive impairment/gait, locomotion, and balance/gross motor/muscle strength/poor safety awareness/posture/ROM/tone

## 2023-10-10 NOTE — PROGRESS NOTE ADULT - SUBJECTIVE AND OBJECTIVE BOX
NEUROLOGY FOLLOW-UP NOTE    NAME:  CINTIA PHIPPS      ASSESSMENT:  78 RHF with short-term memory loss and recent fall without loss of consciousness or seizures, concerning for syncopal event; also with chronic lacunar infarct and presenting with transient right-sided weakness, raising concern for transient ischemic attack      RECOMMENDATIONS:    - Monitor orthostatic BP & HR with each vital signs check    - Cardiovascular workup: Telemetry, Echocardiogram, Cardiothoracic surgery evaluation    - Plentiful fluid hydration (2 liters, or 8 glasses, of water daily) encouraged    - Patient counseled to maintain caution with rapid changes in position    - Maintain Atorvastatin 80mg PO QHS (goal LDL < 70 mg/dL) and add Aspirin 81mg PO Daily for secondary stroke prevention for chronic lacunar infarct    - No role for permissive hypertension - Goal /80, Treat BP > 140/90    - Maintain SBP > 100 mmHg    - MRI Brain without contrast approved (if there are no contraindications) to evaluate for minor ischemic stroke    - PT/OT    - DVT ppx: SCDs, Enoxaparin (therapeutic anticoagulation not needed from a neurological perspective)          NOTE TO BE COMPLETED - PLEASE REFER TO ABOVE ONLY AND IGNORE INFORMATION BELOW    ******************************    HPI:  78 year old female with no known PMH presents after a fall. According to  at bedside, patient was found on the floor and was unable to move her right upper/lower extremities. Denies any LOC, seizure like activity, or urinary incontinence  states that patient has been getting increasingly forgetful for the past few months, and has not seen a physician in over a year. Patient states she does not remember the fall, and currently has no complaints. Denies any headaches dizziness, fever, chills, visual changes, weakness, numbness, tingling chest pain, SOB, abdominal pain, or change in bowel habits.     In the ED:  Afebrile, /148, HR 85, 95% on RA  NIHHS 13  CTH/Angio showed old infarcts w/ 4mm ICA outpouch concerning for aneurysm  s/p labetalol 10mg IV + nicardipine gtt         (09 Oct 2023 03:21)      NEURO HPI:      INTERVAL HISTORY:      MEDICATIONS:  aspirin  chewable 81 milliGRAM(s) Oral daily  atorvastatin 80 milliGRAM(s) Oral at bedtime  cefTRIAXone   IVPB      cefTRIAXone   IVPB 1000 milliGRAM(s) IV Intermittent once  chlorhexidine 2% Cloths 1 Application(s) Topical <User Schedule>  enoxaparin Injectable 40 milliGRAM(s) SubCutaneous every 24 hours  influenza  Vaccine (HIGH DOSE) 0.7 milliLiter(s) IntraMuscular once  labetalol 100 milliGRAM(s) Oral every 12 hours      ALLERGIES:  No Known Allergies      REVIEW OF SYSTEMS:  Fourteen systems reviewed and negative except as in HPI / Interval History.          OBJECTIVE:  Vital Signs Last 24 Hrs  T(C): 36.2 (10 Oct 2023 23:54), Max: 36.8 (10 Oct 2023 06:00)  T(F): 97.1 (10 Oct 2023 23:54), Max: 98.3 (10 Oct 2023 06:00)  HR: 66 (11 Oct 2023 01:00) (48 - 74)  BP: 159/90 (11 Oct 2023 01:00) (118/78 - 203/113)  BP(mean): 110 (11 Oct 2023 01:00) (86 - 139)  RR: 12 (11 Oct 2023 01:00) (8 - 19)  SpO2: 99% (11 Oct 2023 01:00) (95% - 100%)        General Examination:  General: No acute distress  HEENT: Atraumatic, Normocephalic  Respiratory: CTA B/l.  No crackles, rhonchi, or wheezes.  Cardiovascular: RRR.  Normal S1 & S2.  Normal b/l radial and pedal pulses.    Neurological Examination:  General / Mental Status: AAO x 3.  No aphasia or dysarthria.  Naming and repetition intact.  Cranial Nerves: VFF x 4.  PERRL.  EOMI x 2, No nystagmus or diplopia.  B/l V1-V3 equal and intact to light touch and pinprick.  Symmetric facial movement and palate elevation.  B/l hearing equal to finger rub.  5/5 strength with b/l sternocleidomastoid and trapezius.  Midline tongue protrusion, with no atrophy or fasciculations.  Motor: Normal bulk & tone in all four extremities.  5/5 strength throughout all four extremities.  No downward drift, rigidity, spasticity, or tremors in any of the four extremities.  Sensory: Intact to light touch and pinprick in all four extremities.  Negative Romberg.  Reflex: 2+ and symmetric at b/l biceps, triceps, brachioradialis, patellae, and ankles.  Downgoing toes b/l.  Coordination: No dysmetria with b/l finger-to-nose and heel raise tests.  Symmetric rapid alternating movements b/l.  Gait: Normal, narrow-based gait.  No difficulty with tiptoe, heel, and tandem gaits.        LABORATORY VALUES:                          12.8   7.40  )-----------( 194      ( 10 Oct 2023 06:00 )             41.0       10-10    139  |  101  |  19<H>  ----------------------------<  120<H>  3.3<L>   |  32<H>  |  1.27    Ca    9.7      10 Oct 2023 06:00  Phos  4.4     10-10  Mg     2.3     10-10    TPro  7.6  /  Alb  3.5  /  TBili  0.6  /  DBili  x   /  AST  15  /  ALT  13  /  AlkPhos  118  10-10      LIVER FUNCTIONS - ( 10 Oct 2023 06:00 )  Alb: 3.5 g/dL / Pro: 7.6 g/dL / ALK PHOS: 118 U/L / ALT: 13 U/L DA / AST: 15 U/L / GGT: x             10-09 Chol 216<H> LDL -- HDL 60 Trig 91    Glucose Trend  10-10-23 @ 06:00   -  120<H> -- --  10-09-23 @ 10:46   -  -- -- 118<H>  10-09-23 @ 10:30   -  124<H> -- --  10-09-23 @ 00:18   -  114<H> -- --  10-08-23 @ 23:55   -  -- -- 118<H>  10-08-23 @ 23:48   -  -- -- 113<H>                    NEUROIMAGING:          Please contact the Neurology consult service with any neurological questions.      Finn Morgan MD   of Neurology  [facility] [employer] [facility] at [facility]         NEUROLOGY FOLLOW-UP NOTE    NAME:  CINTIA PHIPPS      ASSESSMENT:  78 RHF with short-term memory loss and recent fall without loss of consciousness or seizures, concerning for syncopal event; also with chronic lacunar infarct and presenting with transient right-sided weakness, raising concern for transient ischemic attack      RECOMMENDATIONS:    - Monitor orthostatic BP & HR with each vital signs check    - Cardiovascular workup: Telemetry, Echocardiogram, Cardiothoracic surgery evaluation    - Plentiful fluid hydration (2 liters, or 8 glasses, of water daily) encouraged    - Patient counseled to maintain caution with rapid changes in position    - Maintain Atorvastatin 80mg PO QHS (goal LDL < 70 mg/dL) and add Aspirin 81mg PO Daily for secondary stroke prevention for chronic lacunar infarct    - No role for permissive hypertension - Goal /80, Treat BP > 140/90    - Maintain SBP > 100 mmHg    - MRI Brain without contrast approved (if there are no contraindications) to evaluate for minor ischemic stroke    - PT/OT    - DVT ppx: SCDs, Enoxaparin (therapeutic anticoagulation not needed from a neurological perspective)          ******************************    HPI:  78 year old female with no known PMH presents after a fall. According to  at bedside, patient was found on the floor and was unable to move her right upper/lower extremities. Denies any LOC, seizure like activity, or urinary incontinence.  states that patient has been getting increasingly forgetful for the past few months, and has not seen a physician in over a year. Patient states she does not remember the fall, and currently has no complaints. Denies any headaches dizziness, fever, chills, visual changes, weakness, numbness, tingling chest pain, SOB, abdominal pain, or change in bowel habits.   In the ED:  Afebrile, /148, HR 85, 95% on RA  NIHHS 13  CTH/Angio showed old infarcts w/ 4mm ICA outpouch concerning for aneurysm  s/p labetalol 10mg IV + nicardipine gtt (09 Oct 2023 03:21)      NEURO HPI:  78F with short-term memory loss over the past few months, who was brought to the Emergency Department after being found down on the floor with inability to move her right arm and right leg.      INTERVAL HISTORY:  The patient has not had any witnessed stroke-like episodes overnight.      MEDICATIONS:  aspirin  chewable 81 milliGRAM(s) Oral daily  atorvastatin 80 milliGRAM(s) Oral at bedtime  cefTRIAXone   IVPB      cefTRIAXone   IVPB 1000 milliGRAM(s) IV Intermittent once  chlorhexidine 2% Cloths 1 Application(s) Topical <User Schedule>  enoxaparin Injectable 40 milliGRAM(s) SubCutaneous every 24 hours  influenza  Vaccine (HIGH DOSE) 0.7 milliLiter(s) IntraMuscular once  labetalol 100 milliGRAM(s) Oral every 12 hours      ALLERGIES:  No Known Allergies      REVIEW OF SYSTEMS:  Fourteen systems reviewed and negative or unobtainable except as in HPI / Interval History.          OBJECTIVE:  Vital Signs Last 24 Hrs  T(C): 36.2 (10 Oct 2023 23:54), Max: 36.8 (10 Oct 2023 06:00)  T(F): 97.1 (10 Oct 2023 23:54), Max: 98.3 (10 Oct 2023 06:00)  HR: 66 (11 Oct 2023 01:00) (48 - 74)  BP: 159/90 (11 Oct 2023 01:00) (118/78 - 203/113)  BP(mean): 110 (11 Oct 2023 01:00) (86 - 139)  RR: 12 (11 Oct 2023 01:00) (8 - 19)  SpO2: 99% (11 Oct 2023 01:00) (95% - 100%)      General Examination:  General: No acute distress  HEENT: Atraumatic, Normocephalic  Respiratory: Low rate, Diminished breath sounds b/l  Cardiovascular: RRR.  Normal S1 & S2.  Normal b/l radial and pedal pulses.    Neurological Examination:  General / Mental Status: AAO x 1 (only to person).  Minimally verbal, with no apparent aphasia or dysarthria.  Cranial Nerves: B/l blink to threat present.  PERRL.  EOMI x 2, No nystagmus.  B/l V1-V3 equal and intact to light touch and pinprick.  Symmetric facial movement and palate elevation.  B/l hearing diminished to finger rub.  At least 3/5 strength with b/l sternocleidomastoid and trapezius, limited by poor effort.  Midline tongue protrusion.  Motor: Diminished bulk & tone in all four extremities.  At least 3/5 strength throughout all four extremities, all of which drift downward to bed, limited by poor effort.  No rigidity, spasticity, or tremors in any of the four extremities.  Sensory: Intact to light touch and pinprick in all four extremities.  Reflex: 1+ and symmetric at b/l biceps, triceps, brachioradialis, patellae, and ankles.  Mute toes b/l.  Unable to assess coordination, gait, or Romberg testing in patient due to poor cooperation with exam.      NIHSS Score:    LOC - 0  LOC Questions - 1  LOC Commands - 1  Gaze Preference - 0  Visual Fields - 0  Facial Palsy - 0  Motor Arm Left - 2  Motor Arm Right - 2  Motor Leg Left - 2  Motor Leg Right - 2  Limb Ataxia - UN  Sensory - 0  Language - 0  Speech - 0  Extinction - UN    NIHSS Score Total: 10 - No IV TNK because of uncertain last seen normal time    Modified [place] Scale: 4          LABORATORY VALUES:                          12.8   7.40  )-----------( 194      ( 10 Oct 2023 06:00 )             41.0       10-10    139  |  101  |  19<H>  ----------------------------<  120<H>  3.3<L>   |  32<H>  |  1.27    Ca    9.7      10 Oct 2023 06:00  Phos  4.4     10-10  Mg     2.3     10-10    TPro  7.6  /  Alb  3.5  /  TBili  0.6  /  DBili  x   /  AST  15  /  ALT  13  /  AlkPhos  118  10-10    10-09 Chol 216<H> <H> HDL 60 Trig 91    Glucose Trend  10-10-23 @ 06:00   -  120<H> -- --  10-09-23 @ 10:46   -  -- -- 118<H>  10-09-23 @ 10:30   -  124<H> -- --  10-09-23 @ 00:18   -  114<H> -- --  10-08-23 @ 23:55   -  -- -- 118<H>  10-08-23 @ 23:48   -  -- -- 113<H>    A1C with Estimated Average Glucose (10.09.23 @ 10:30)   A1C with Estimated Average Glucose Result: 5.9%  Estimated Average Glucose: 123 mg/dL          NEUROIMAGING:      CT Head & CTA Head/Neck & CT Perfusion Head (10/9/23):  - No acute intracranial abnormality  - Chronic left frontal and right basal ganglia infarcts  - Chronic microvascular changes  - Mild diffuse atrophy  - No intracranial or neck large vessel cutoff  - Right supraclinoid ICA posteriorly projecting aneurysm  - No focal hypoperfusion      TTE x 2 (10/9/23 & 10/10/23):  - LVEF 55-60%  - Severe concentric LV hypertrophy  - Grade I (mild) diastolic dysfunction  - Moderate tricuspid regurgitation  - No cardiac thrombus or intracardiac shunt          Please contact the Neurology consult service with any neurological questions.      Finn Morgan MD   of Neurology  [facility] [employer] [facility] at [facility]

## 2023-10-10 NOTE — PHYSICAL THERAPY INITIAL EVALUATION ADULT - ACTIVE RANGE OF MOTION EXAMINATION, REHAB EVAL
except shoulder ~1/3 range, LLE hip ~1/4 range, kne ~1/3 range and ankle WFL. RUE no active motion shoulder, elbow few deg motion and hand ~1/2 motion. RLE trace motion, except ankle ~1/3 range/Left UE Active ROM was WFL (within functional limits)

## 2023-10-10 NOTE — PHYSICAL THERAPY INITIAL EVALUATION ADULT - IMPAIRED TRANSFERS: SIT/STAND, REHAB EVAL
impaired balance/cognition/abnormal muscle tone/narrow base of support/impaired postural control/decreased strength

## 2023-10-10 NOTE — PHYSICAL THERAPY INITIAL EVALUATION ADULT - COORDINATION ASSESSED, REHAB EVAL
Assessment limited by cognition and weakness RUE / RLE grossly intact,. Rt thumb to index finger intact but delayed

## 2023-10-10 NOTE — PHYSICAL THERAPY INITIAL EVALUATION ADULT - GENERAL OBSERVATIONS, REHAB EVAL
Consult received, chart reviewed. Patient received supine in bed, NAD, + Cardiac and SPo2 monitoring, +SCDs.  RASS -1; CAM ICU (+). Patient agreed to EVALUATION from Physical Therapist. Consult received, chart reviewed. Patient received supine in bed, NAD, + Cardiac and SPo2 monitoring, +SCDs.  RASS -1; CAM ICU (+). Patient agreed to EVALUATION from Physical Therapist. Spouse present. On enhanced supervision

## 2023-10-10 NOTE — PHYSICAL THERAPY INITIAL EVALUATION ADULT - PERTINENT HX OF CURRENT PROBLEM, REHAB EVAL
78 year old female with no known PMH presents after a fall and was found to have right sided weakness. r/o CVA. Per chart, CTH was negative for any acute infarcts but did show a possible ICA aneurysm and   CT A/P showed a large thoracic aneurysm.

## 2023-10-10 NOTE — PHYSICAL THERAPY INITIAL EVALUATION ADULT - MANUAL MUSCLE TESTING RESULTS, REHAB EVAL
LUE 3+/5 except shoulder 2+/5. RUE 1/5 shoulders 2-/5 elbow and 2+/5 grossly hand. LLE at least 2+/5 hip and knee and 3-/5 ankle. RLE 2-/5 hip and knee and 2+/5 ankle

## 2023-10-11 DIAGNOSIS — R53.81 OTHER MALAISE: ICD-10-CM

## 2023-10-11 DIAGNOSIS — I63.9 CEREBRAL INFARCTION, UNSPECIFIED: ICD-10-CM

## 2023-10-11 DIAGNOSIS — I16.1 HYPERTENSIVE EMERGENCY: ICD-10-CM

## 2023-10-11 DIAGNOSIS — Z51.5 ENCOUNTER FOR PALLIATIVE CARE: ICD-10-CM

## 2023-10-11 LAB
-  AMIKACIN: SIGNIFICANT CHANGE UP
-  AMOXICILLIN/CLAVULANIC ACID: SIGNIFICANT CHANGE UP
-  AMPICILLIN/SULBACTAM: SIGNIFICANT CHANGE UP
-  AMPICILLIN: SIGNIFICANT CHANGE UP
-  AZTREONAM: SIGNIFICANT CHANGE UP
-  CEFAZOLIN: SIGNIFICANT CHANGE UP
-  CEFEPIME: SIGNIFICANT CHANGE UP
-  CEFOXITIN: SIGNIFICANT CHANGE UP
-  CEFTRIAXONE: SIGNIFICANT CHANGE UP
-  CEFUROXIME: SIGNIFICANT CHANGE UP
-  CIPROFLOXACIN: SIGNIFICANT CHANGE UP
-  ERTAPENEM: SIGNIFICANT CHANGE UP
-  GENTAMICIN: SIGNIFICANT CHANGE UP
-  IMIPENEM: SIGNIFICANT CHANGE UP
-  LEVOFLOXACIN: SIGNIFICANT CHANGE UP
-  MEROPENEM: SIGNIFICANT CHANGE UP
-  NITROFURANTOIN: SIGNIFICANT CHANGE UP
-  PIPERACILLIN/TAZOBACTAM: SIGNIFICANT CHANGE UP
-  TOBRAMYCIN: SIGNIFICANT CHANGE UP
-  TRIMETHOPRIM/SULFAMETHOXAZOLE: SIGNIFICANT CHANGE UP
ALBUMIN SERPL ELPH-MCNC: 3.5 G/DL — SIGNIFICANT CHANGE UP (ref 3.5–5)
ALP SERPL-CCNC: 112 U/L — SIGNIFICANT CHANGE UP (ref 40–120)
ALT FLD-CCNC: 16 U/L DA — SIGNIFICANT CHANGE UP (ref 10–60)
ANION GAP SERPL CALC-SCNC: 7 MMOL/L — SIGNIFICANT CHANGE UP (ref 5–17)
AST SERPL-CCNC: 21 U/L — SIGNIFICANT CHANGE UP (ref 10–40)
BASOPHILS # BLD AUTO: 0.03 K/UL — SIGNIFICANT CHANGE UP (ref 0–0.2)
BASOPHILS NFR BLD AUTO: 0.4 % — SIGNIFICANT CHANGE UP (ref 0–2)
BILIRUB SERPL-MCNC: 0.6 MG/DL — SIGNIFICANT CHANGE UP (ref 0.2–1.2)
BUN SERPL-MCNC: 19 MG/DL — HIGH (ref 7–18)
CALCIUM SERPL-MCNC: 9.3 MG/DL — SIGNIFICANT CHANGE UP (ref 8.4–10.5)
CHLORIDE SERPL-SCNC: 104 MMOL/L — SIGNIFICANT CHANGE UP (ref 96–108)
CO2 SERPL-SCNC: 29 MMOL/L — SIGNIFICANT CHANGE UP (ref 22–31)
CREAT SERPL-MCNC: 1.07 MG/DL — SIGNIFICANT CHANGE UP (ref 0.5–1.3)
CULTURE RESULTS: SIGNIFICANT CHANGE UP
EGFR: 54 ML/MIN/1.73M2 — LOW
EOSINOPHIL # BLD AUTO: 0.1 K/UL — SIGNIFICANT CHANGE UP (ref 0–0.5)
EOSINOPHIL NFR BLD AUTO: 1.3 % — SIGNIFICANT CHANGE UP (ref 0–6)
GLUCOSE SERPL-MCNC: 104 MG/DL — HIGH (ref 70–99)
HCT VFR BLD CALC: 41.7 % — SIGNIFICANT CHANGE UP (ref 34.5–45)
HGB BLD-MCNC: 13 G/DL — SIGNIFICANT CHANGE UP (ref 11.5–15.5)
IMM GRANULOCYTES NFR BLD AUTO: 0.3 % — SIGNIFICANT CHANGE UP (ref 0–0.9)
LYMPHOCYTES # BLD AUTO: 1.64 K/UL — SIGNIFICANT CHANGE UP (ref 1–3.3)
LYMPHOCYTES # BLD AUTO: 20.6 % — SIGNIFICANT CHANGE UP (ref 13–44)
MAGNESIUM SERPL-MCNC: 2.5 MG/DL — SIGNIFICANT CHANGE UP (ref 1.6–2.6)
MCHC RBC-ENTMCNC: 27.7 PG — SIGNIFICANT CHANGE UP (ref 27–34)
MCHC RBC-ENTMCNC: 31.2 GM/DL — LOW (ref 32–36)
MCV RBC AUTO: 88.7 FL — SIGNIFICANT CHANGE UP (ref 80–100)
METHOD TYPE: SIGNIFICANT CHANGE UP
MONOCYTES # BLD AUTO: 0.44 K/UL — SIGNIFICANT CHANGE UP (ref 0–0.9)
MONOCYTES NFR BLD AUTO: 5.5 % — SIGNIFICANT CHANGE UP (ref 2–14)
NEUTROPHILS # BLD AUTO: 5.75 K/UL — SIGNIFICANT CHANGE UP (ref 1.8–7.4)
NEUTROPHILS NFR BLD AUTO: 71.9 % — SIGNIFICANT CHANGE UP (ref 43–77)
NRBC # BLD: 0 /100 WBCS — SIGNIFICANT CHANGE UP (ref 0–0)
ORGANISM # SPEC MICROSCOPIC CNT: SIGNIFICANT CHANGE UP
ORGANISM # SPEC MICROSCOPIC CNT: SIGNIFICANT CHANGE UP
PHOSPHATE SERPL-MCNC: 4 MG/DL — SIGNIFICANT CHANGE UP (ref 2.5–4.5)
PLATELET # BLD AUTO: 206 K/UL — SIGNIFICANT CHANGE UP (ref 150–400)
POTASSIUM SERPL-MCNC: 4 MMOL/L — SIGNIFICANT CHANGE UP (ref 3.5–5.3)
POTASSIUM SERPL-SCNC: 4 MMOL/L — SIGNIFICANT CHANGE UP (ref 3.5–5.3)
PROT SERPL-MCNC: 7.5 G/DL — SIGNIFICANT CHANGE UP (ref 6–8.3)
RBC # BLD: 4.7 M/UL — SIGNIFICANT CHANGE UP (ref 3.8–5.2)
RBC # FLD: 13.1 % — SIGNIFICANT CHANGE UP (ref 10.3–14.5)
SODIUM SERPL-SCNC: 140 MMOL/L — SIGNIFICANT CHANGE UP (ref 135–145)
SPECIMEN SOURCE: SIGNIFICANT CHANGE UP
WBC # BLD: 7.98 K/UL — SIGNIFICANT CHANGE UP (ref 3.8–10.5)
WBC # FLD AUTO: 7.98 K/UL — SIGNIFICANT CHANGE UP (ref 3.8–10.5)

## 2023-10-11 PROCEDURE — 99223 1ST HOSP IP/OBS HIGH 75: CPT | Mod: 25

## 2023-10-11 PROCEDURE — 71045 X-RAY EXAM CHEST 1 VIEW: CPT | Mod: 26

## 2023-10-11 RX ORDER — LABETALOL HCL 100 MG
10 TABLET ORAL ONCE
Refills: 0 | Status: COMPLETED | OUTPATIENT
Start: 2023-10-11 | End: 2023-10-11

## 2023-10-11 RX ORDER — SODIUM CHLORIDE 9 MG/ML
1000 INJECTION, SOLUTION INTRAVENOUS
Refills: 0 | Status: DISCONTINUED | OUTPATIENT
Start: 2023-10-11 | End: 2023-10-11

## 2023-10-11 RX ORDER — HYDRALAZINE HCL 50 MG
5 TABLET ORAL ONCE
Refills: 0 | Status: COMPLETED | OUTPATIENT
Start: 2023-10-11 | End: 2023-10-11

## 2023-10-11 RX ORDER — CEFTRIAXONE 500 MG/1
1000 INJECTION, POWDER, FOR SOLUTION INTRAMUSCULAR; INTRAVENOUS EVERY 24 HOURS
Refills: 0 | Status: COMPLETED | OUTPATIENT
Start: 2023-10-12 | End: 2023-10-14

## 2023-10-11 RX ORDER — CEFTRIAXONE 500 MG/1
1000 INJECTION, POWDER, FOR SOLUTION INTRAMUSCULAR; INTRAVENOUS ONCE
Refills: 0 | Status: COMPLETED | OUTPATIENT
Start: 2023-10-11 | End: 2023-10-11

## 2023-10-11 RX ORDER — AMLODIPINE BESYLATE 2.5 MG/1
10 TABLET ORAL DAILY
Refills: 0 | Status: DISCONTINUED | OUTPATIENT
Start: 2023-10-11 | End: 2023-10-13

## 2023-10-11 RX ORDER — ASPIRIN/CALCIUM CARB/MAGNESIUM 324 MG
81 TABLET ORAL DAILY
Refills: 0 | Status: DISCONTINUED | OUTPATIENT
Start: 2023-10-11 | End: 2023-10-18

## 2023-10-11 RX ORDER — DEXTROSE 50 % IN WATER 50 %
50 SYRINGE (ML) INTRAVENOUS ONCE
Refills: 0 | Status: COMPLETED | OUTPATIENT
Start: 2023-10-11 | End: 2023-10-11

## 2023-10-11 RX ORDER — OLANZAPINE 15 MG/1
2.5 TABLET, FILM COATED ORAL ONCE
Refills: 0 | Status: COMPLETED | OUTPATIENT
Start: 2023-10-11 | End: 2023-10-11

## 2023-10-11 RX ORDER — OLANZAPINE 15 MG/1
5 TABLET, FILM COATED ORAL AT BEDTIME
Refills: 0 | Status: DISCONTINUED | OUTPATIENT
Start: 2023-10-11 | End: 2023-10-13

## 2023-10-11 RX ORDER — CEFTRIAXONE 500 MG/1
INJECTION, POWDER, FOR SOLUTION INTRAMUSCULAR; INTRAVENOUS
Refills: 0 | Status: DISCONTINUED | OUTPATIENT
Start: 2023-10-11 | End: 2023-10-11

## 2023-10-11 RX ORDER — SODIUM CHLORIDE 9 MG/ML
1000 INJECTION, SOLUTION INTRAVENOUS
Refills: 0 | Status: DISCONTINUED | OUTPATIENT
Start: 2023-10-11 | End: 2023-10-12

## 2023-10-11 RX ADMIN — SODIUM CHLORIDE 75 MILLILITER(S): 9 INJECTION, SOLUTION INTRAVENOUS at 16:34

## 2023-10-11 RX ADMIN — Medication 5 MILLIGRAM(S): at 07:48

## 2023-10-11 RX ADMIN — OLANZAPINE 2.5 MILLIGRAM(S): 15 TABLET, FILM COATED ORAL at 02:27

## 2023-10-11 RX ADMIN — Medication 50 MILLILITER(S): at 11:49

## 2023-10-11 RX ADMIN — Medication 50 MILLILITER(S): at 16:45

## 2023-10-11 RX ADMIN — Medication 10 MILLIGRAM(S): at 07:15

## 2023-10-11 RX ADMIN — CEFTRIAXONE 100 MILLIGRAM(S): 500 INJECTION, POWDER, FOR SOLUTION INTRAMUSCULAR; INTRAVENOUS at 02:17

## 2023-10-11 RX ADMIN — Medication 100 MILLIGRAM(S): at 18:33

## 2023-10-11 RX ADMIN — CHLORHEXIDINE GLUCONATE 1 APPLICATION(S): 213 SOLUTION TOPICAL at 06:09

## 2023-10-11 RX ADMIN — OLANZAPINE 5 MILLIGRAM(S): 15 TABLET, FILM COATED ORAL at 21:20

## 2023-10-11 RX ADMIN — ENOXAPARIN SODIUM 40 MILLIGRAM(S): 100 INJECTION SUBCUTANEOUS at 18:33

## 2023-10-11 RX ADMIN — ATORVASTATIN CALCIUM 80 MILLIGRAM(S): 80 TABLET, FILM COATED ORAL at 21:20

## 2023-10-11 NOTE — CONSULT NOTE ADULT - PROBLEM SELECTOR RECOMMENDATION 4
78 year old female with no known PMH presents after a fall and was found to have right sided weakness with NIHHS score of 13. Symptoms have now resolved and CTH was negative for any acute infarcts but did show a possible ICA aneurysm and   CT A/P showed a large thoracic aneurysm.  Patient was found to have HTN urgency and due to the large size of the thoracic aneurysm, admitted to ICU for closer BP monitoring with labetalol gtt.  Guarded prognosis.   Please see discussion noted above in GOC conversation

## 2023-10-11 NOTE — CONSULT NOTE ADULT - PROBLEM SELECTOR RECOMMENDATION 9
p/w  transient right-sided weakness, raising concern for transient ischemic attack  's   High risk for intercranial hemorrhage and Stroke   Per Neuro, no role for permissive hypertension - Goal /80, Treat BP > 140/90    Pt remains a FULL CODE    Poor prognosis.   CTH 10/8 with no acute ischemia or hemorrhage. 4 mm outpouching projecting posteriorly inferiorly off the right supraclinoid internal carotid artery at its terminus, concerning for aneurysm.

## 2023-10-11 NOTE — CHART NOTE - NSCHARTNOTEFT_GEN_A_CORE
PC SW received verbal consult for emotional support. PC SW met with pt's  Joe at bedside to provide support. Pt's  reported that he is adjusting appropriately to the pt's ICU admission. Pt's  shared that his focus is on the pt, and all that he wants is for her to get better. Pt's  expressed appreciation for SW support but denied having any need for SW services at this time. Pt's  agreed to reach out as needed.

## 2023-10-11 NOTE — DIETITIAN INITIAL EVALUATION ADULT - PERTINENT MEDS FT
MEDICATIONS  (STANDING):  amLODIPine   Tablet 10 milliGRAM(s) Oral daily  aspirin  chewable 81 milliGRAM(s) Oral daily  atorvastatin 80 milliGRAM(s) Oral at bedtime  cefTRIAXone   IVPB      chlorhexidine 2% Cloths 1 Application(s) Topical <User Schedule>  enoxaparin Injectable 40 milliGRAM(s) SubCutaneous every 24 hours  influenza  Vaccine (HIGH DOSE) 0.7 milliLiter(s) IntraMuscular once  labetalol 100 milliGRAM(s) Oral every 12 hours  OLANZapine 5 milliGRAM(s) Oral at bedtime    MEDICATIONS  (PRN):

## 2023-10-11 NOTE — CONSULT NOTE ADULT - PROBLEM SELECTOR RECOMMENDATION 2
CVA w/right-sided focal deficits, Surgery following  CTH/Angio showed old infarcts w/ 4mm ICA outpouch concerning for aneurysm    Pt remains a FULL CODE

## 2023-10-11 NOTE — DIETITIAN INITIAL EVALUATION ADULT - OTHER INFO
Pt admit w/ TIA, s/p SLP 10/9, diet ordered. As per IDR NG inseted for PO meds only, at this time (has PO diet). Pt seen, asleep. RN reports pt too lethargic to feed.

## 2023-10-11 NOTE — CONSULT NOTE ADULT - CONSULT REASON
Stroke
descending thoracic aortic aneurysm
Discuss complex medical decision making related to goals of care

## 2023-10-11 NOTE — DIETITIAN INITIAL EVALUATION ADULT - ADD RECOMMEND
Consider NG feeds as medically feasible and if consistent with goals of care. MD to monitor. RD available.

## 2023-10-11 NOTE — CONSULT NOTE ADULT - PROBLEM SELECTOR RECOMMENDATION 3
78 year old female with no known PMH presents after a fall and was found to have right sided weakness with NIHHS score of 13. Symptoms have now resolved and CTH was negative for any acute infarcts but did show a possible ICA aneurysm and   CT A/P showed a large thoracic aneurysm.  Patient was found to have HTN urgency and due to the large size of the thoracic aneurysm, admitted to ICU for closer BP monitoring with labetalol gtt.  Please see discussion noted above in GOC conversation Independent prior to admission.  Currently bedbound. Total care.  Supportive care  OOB to chair as tolerated  Freq positioning    Pt eval

## 2023-10-11 NOTE — CONSULT NOTE ADULT - SUBJECTIVE AND OBJECTIVE BOX
[facility] Geriatric and Palliative Consult Service:  Nabila Lacy DO: cell ([phone])  Uziel Alcantara MD: cell ([phone])  Prashant Melendez NP: cell ([phone])   Grzegorz Shea SW: cell ([phone])   Kaylan Petty NP: via [employer] Teams    Can contact any Palliative Team member via [employer] Teams for consults and questions      HPI:  78 year old female with no known PMH presents after a fall. According to  at bedside, patient was found on the floor and was unable to move her right upper/lower extremities. Denies any LOC, seizure like activity, or urinary incontinence  states that patient has been getting increasingly forgetful for the past few months, and has not seen a physician in over a year. Patient states she does not remember the fall, and currently has no complaints. Denies any headaches dizziness, fever, chills, visual changes, weakness, numbness, tingling chest pain, SOB, abdominal pain, or change in bowel habits.     In the ED:  Afebrile, /148, HR 85, 95% on RA  NIHHS 13  CTH/Angio showed old infarcts w/ 4mm ICA outpouch concerning for aneurysm  s/p labetalol 10mg IV + nicardipine gtt   (09 Oct 2023 03:21)    Interval hx:       PAST MEDICAL & SURGICAL HISTORY:  No pertinent past medical history      No significant past surgical history          SOCIAL HISTORY:    Admitted from:  home with her spouse  Pt's spouse Joe Orourke makes medical decisions  [ none ] Substance abuse, [ none ] Tobacco hx, [ none ] Alcohol hx, [ none ] Home Opioid Hx  Yazdanism: n/a    FAMILY HISTORY:   unable to obtain from patient due to poor mentation, family unable to give information, see H&P for history  Baseline ADLs (prior to admission):    Allergies    No Known Allergies    Intolerances      Present Symptoms: Mild, Moderate, Severe  Pain:             Location -                               Aggravating factors -             Quality -             Radiation -             Timing-             Severity (0-10 scale):             Minimal acceptable level (0-10 scale):  Fatigue:  Nausea:  Lack of Appetite:   SOB:  Depression:  Anxiety:  Review of Systems: [All others negative or Unable to obtain due to poor mentation]    CPOT:    https://www.[facility].org/getattachment/[email or web address]/Critical-Care-Pain-Observation-Tool-(CPOT)  PAIN AD Score:   http://geriatrictoolkit.[email or web address]/painad.pdf (press ctrl +  left click to view)      MEDICATIONS  (STANDING):  amLODIPine   Tablet 10 milliGRAM(s) Oral daily  aspirin  chewable 81 milliGRAM(s) Oral daily  atorvastatin 80 milliGRAM(s) Oral at bedtime  cefTRIAXone   IVPB      chlorhexidine 2% Cloths 1 Application(s) Topical <User Schedule>  enoxaparin Injectable 40 milliGRAM(s) SubCutaneous every 24 hours  influenza  Vaccine (HIGH DOSE) 0.7 milliLiter(s) IntraMuscular once  labetalol 100 milliGRAM(s) Oral every 12 hours  OLANZapine 5 milliGRAM(s) Oral at bedtime    MEDICATIONS  (PRN):      PHYSICAL EXAM:  Vital Signs Last 24 Hrs  T(C): 36.2 (11 Oct 2023 12:00), Max: 36.4 (11 Oct 2023 05:06)  T(F): 97.1 (11 Oct 2023 12:00), Max: 97.5 (11 Oct 2023 05:06)  HR: 61 (11 Oct 2023 14:04) (55 - 85)  BP: 107/77 (11 Oct 2023 14:04) (83/61 - 209/111)  BP(mean): 87 (11 Oct 2023 14:04) (71 - 139)  RR: 10 (11 Oct 2023 14:04) (8 - 23)  SpO2: 100% (11 Oct 2023 14:04) (97% - 100%)    Parameters below as of 11 Oct 2023 14:04  Patient On (Oxygen Delivery Method): room air        General: alert  oriented x ____    lethargic distressed cachexia  nonverbal  unarousable verbal    Palliative Performance Scale/Karnofsky Score:  http://npcrc.org/files/news/palliative_performance_scale_ppsv2.pdf    HEENT: no abnormal lesion, dry mouth  ET tube/trach oral lesions:  Lungs: tachypnea/labored breathing, audible excessive secretions  CV: RRR, S1S2, tachycardia  GI: soft non distended non tender  incontinent               PEG/NG/OG tube  constipation  last BM:   : incontinent  oliguria/anuria  marquez  Musculoskeletal: weakness x4 edema x4    ambulatory with assistance   mostly/fully bedbound/wheelchair bound  Skin: no abnormal skin lesions, poor skin turgor, pressure ulcer stage:   Neuro: no deficits, mild cognitive impairment dsyphagia/dysarthria paresis  Oral intake ability: unable/only mouth care, minimal moderate full capability    LABS:                        13.0   7.98  )-----------( 206      ( 11 Oct 2023 03:56 )             41.7     10-11    140  |  104  |  19<H>  ----------------------------<  104<H>  4.0   |  29  |  1.07    Ca    9.3      11 Oct 2023 03:56  Phos  4.0     10-11  Mg     2.5     10-11    TPro  7.5  /  Alb  3.5  /  TBili  0.6  /  DBili  x   /  AST  21  /  ALT  16  /  AlkPhos  112  10-11    Urinalysis Basic - ( 11 Oct 2023 03:56 )    Color: x / Appearance: x / SG: x / pH: x  Gluc: 104 mg/dL / Ketone: x  / Bili: x / Urobili: x   Blood: x / Protein: x / Nitrite: x   Leuk Esterase: x / RBC: x / WBC x   Sq Epi: x / Non Sq Epi: x / Bacteria: x    < from: CT Angio Abdomen and Pelvis w/ IV Cont (10.09.23 @ 03:04) >  ACC: [id number] EXAM:  CT ANGIO CHEST AORTA WAWIC   ORDERED BY: CYNTHIA BECKER     ACC: [id number] EXAM:  CT ANGIO ABD PELV (W)AW IC   ORDERED BY: CYNTHIA BECKER     PROCEDURE DATE:  10/09/2023          INTERPRETATION:  CLINICAL INFORMATION: Thoracic aortic aneurysm    COMPARISON: None.    CONTRAST/COMPLICATIONS:  IV Contrast: IV contrast documented in unlinked concurrent exam   (accession [id number]), Omnipaque 350 (accession [id number])  90 cc   administered   10 cc discarded  Oral Contrast: NONE  Complications: None reported at time of study completion    PROCEDURE:  CT Angiography of the Chest, Abdomen and Pelvis.  Precontrast imaging was performed through the chest followed by arterial   phase imaging of the chest, abdomen and pelvis.  Sagittal and coronal reformats were performed as well as 3D (MIP)   reconstructions.    FINDINGS:  CHEST:  LUNGS AND LARGE AIRWAYS: Patent central airways. No pulmonary nodules.   Bronchial wall thickening.  PLEURA: No pleural effusion.  VESSELS: Tortuous aorta with aneurysm of the descending aorta measuring   up to 6 cm (25-49). There is up to 1 cm mural thrombi around the   aneurysm. There is no aortic dissection.  HEART: Heart size is normal. No pericardial effusion.  MEDIASTINUM AND CRISTOBAL: No lymphadenopathy.  CHEST WALL AND LOWER NECK: Within normal limits.    ABDOMEN AND PELVIS:  LIVER: Within normal limits.  BILE DUCTS: Normal caliber.  GALLBLADDER: Within normal limits.  SPLEEN: Within normal limits.  PANCREAS: Within normal limits.  ADRENALS: Within normal limits.  KIDNEYS/URETERS: 2.8 cm sized the right renal cyst    BLADDER: Within normal limits.  REPRODUCTIVE ORGANS: Not evaluated on axial planes.    BOWEL: No bowel obstruction. Appendix is not clearly visualized.  PERITONEUM: Noascites.  VESSELS: Tortuous intra-abdominal aorta with mild aneurysmal dilatation   of infrarenal segment. Moderate atherosclerotic change.  RETROPERITONEUM/LYMPH NODES: No lymphadenopathy.  ABDOMINAL WALL: Within normal limits.  BONES: Degenerative change. Grade 1 spondylolysis stenosis of L4 on L5.   No acute abnormality.    IMPRESSION:  Descending thoracic aneurysm with extensive mural thrombi. No aortic   dissection.    < end of copied text >      RADIOLOGY & ADDITIONAL STUDIES: reviewed  ADVANCED DIRECTIVES: FULL CODE         [facility] Geriatric and Palliative Consult Service:  Nabila Lacy DO: cell ([phone])  Uziel Alcantara MD: cell ([phone])  Prashant Melendez NP: cell ([phone])   Grzegorz Shea SW: cell ([phone])   Kaylan Petty NP: via [employer] Teams    Can contact any Palliative Team member via [employer] Teams for consults and questions      HPI:  78 year old female with no known PMH presents after a fall. According to  at bedside, patient was found on the floor and was unable to move her right upper/lower extremities. Denies any LOC, seizure like activity, or urinary incontinence  states that patient has been getting increasingly forgetful for the past few months, and has not seen a physician in over a year. Patient states she does not remember the fall, and currently has no complaints. Denies any headaches dizziness, fever, chills, visual changes, weakness, numbness, tingling chest pain, SOB, abdominal pain, or change in bowel habits.     In the ED:  Afebrile, /148, HR 85, 95% on RA  NIHHS 13  CTH/Angio showed old infarcts w/ 4mm ICA outpouch concerning for aneurysm  s/p labetalol 10mg IV + nicardipine gtt   (09 Oct 2023 03:21)    Interval hx: Pt seen and examine shirlene the bedside, lethargic, AOX1.  Spouse [place] at the bedside.        PAST MEDICAL & SURGICAL HISTORY:  No pertinent past medical history      No significant past surgical history          SOCIAL HISTORY:    Admitted from:  home with her spouse  Pt's spouse Joe Orourke makes medical decisions  [ none ] Substance abuse, [ none ] Tobacco hx, [ none ] Alcohol hx, [ none ] Home Opioid Hx  Taoist: n/a    Joe Orourke  (HCP)    Phone#  [phone]    FAMILY HISTORY:   unable to obtain from patient due to poor mentation, family unable to give information, see H&P for history  Baseline ADLs (prior to admission): independent    Allergies    No Known Allergies    Intolerances      Present Symptoms: Mild, Moderate, Severe  Unable to obtain due to clinical condition    CPOT:    https://www.[facility].org/getattachment/[email or web address]/Critical-Care-Pain-Observation-Tool-(CPOT)  PAIN AD Score:   [email or web address]/[email or web address].pdf (press ctrl +  left click to view)      MEDICATIONS  (STANDING):  amLODIPine   Tablet 10 milliGRAM(s) Oral daily  aspirin  chewable 81 milliGRAM(s) Oral daily  atorvastatin 80 milliGRAM(s) Oral at bedtime  cefTRIAXone   IVPB      chlorhexidine 2% Cloths 1 Application(s) Topical <User Schedule>  enoxaparin Injectable 40 milliGRAM(s) SubCutaneous every 24 hours  influenza  Vaccine (HIGH DOSE) 0.7 milliLiter(s) IntraMuscular once  labetalol 100 milliGRAM(s) Oral every 12 hours  OLANZapine 5 milliGRAM(s) Oral at bedtime    MEDICATIONS  (PRN):      PHYSICAL EXAM:  Vital Signs Last 24 Hrs  T(C): 36.2 (11 Oct 2023 12:00), Max: 36.4 (11 Oct 2023 05:06)  T(F): 97.1 (11 Oct 2023 12:00), Max: 97.5 (11 Oct 2023 05:06)  HR: 61 (11 Oct 2023 14:04) (55 - 85)  BP: 107/77 (11 Oct 2023 14:04) (83/61 - 209/111)  BP(mean): 87 (11 Oct 2023 14:04) (71 - 139)  RR: 10 (11 Oct 2023 14:04) (8 - 23)  SpO2: 100% (11 Oct 2023 14:04) (97% - 100%)    Parameters below as of 11 Oct 2023 14:04  Patient On (Oxygen Delivery Method): room air        General: Pt lethargic, minimally responsible to stimuli.  NAD    Palliative Performance Scale/Karnofsky Score: 30%  http://npcrc.org/files/news/palliative_performance_scale_ppsv2.pdf    HEENT: no abnormal lesion, dry mouth, +NGT  Lungs: unlabored on RA  CV: RRR, S1S2, tachycardia  GI: soft non distended non tender  incontinent  : incontinent/  marquez  Musculoskeletal: weakness x4, bedbound, no edema  Skin: no abnormal skin lesions, poor skin turgor  Neuro: minimally responsive, unable to follow commands, dysphagia +Dysarthria.   Oral intake ability: poor po intake     LABS:                        13.0   7.98  )-----------( 206      ( 11 Oct 2023 03:56 )             41.7     10-11    140  |  104  |  19<H>  ----------------------------<  104<H>  4.0   |  29  |  1.07    Ca    9.3      11 Oct 2023 03:56  Phos  4.0     10-11  Mg     2.5     10-11    TPro  7.5  /  Alb  3.5  /  TBili  0.6  /  DBili  x   /  AST  21  /  ALT  16  /  AlkPhos  112  10-11    Urinalysis Basic - ( 11 Oct 2023 03:56 )    Color: x / Appearance: x / SG: x / pH: x  Gluc: 104 mg/dL / Ketone: x  / Bili: x / Urobili: x   Blood: x / Protein: x / Nitrite: x   Leuk Esterase: x / RBC: x / WBC x   Sq Epi: x / Non Sq Epi: x / Bacteria: x    < from: CT Angio Abdomen and Pelvis w/ IV Cont (10.09.23 @ 03:04) >  ACC: [id number] EXAM:  CT ANGIO CHEST AORTA WAWIC   ORDERED BY: CYNTHIA BECKER     ACC: [id number] EXAM:  CT ANGIO ABD PELV (W)AW IC   ORDERED BY: CYNTHIA BECKER     PROCEDURE DATE:  10/09/2023          INTERPRETATION:  CLINICAL INFORMATION: Thoracic aortic aneurysm    COMPARISON: None.    CONTRAST/COMPLICATIONS:  IV Contrast: IV contrast documented in unlinked concurrent exam   (accession [id number]), Omnipaque 350 (accession [id number])  90 cc   administered   10 cc discarded  Oral Contrast: NONE  Complications: None reported at time of study completion    PROCEDURE:  CT Angiography of the Chest, Abdomen and Pelvis.  Precontrast imaging was performed through the chest followed by arterial   phase imaging of the chest, abdomen and pelvis.  Sagittal and coronal reformats were performed as well as 3D (MIP)   reconstructions.    FINDINGS:  CHEST:  LUNGS AND LARGE AIRWAYS: Patent central airways. No pulmonary nodules.   Bronchial wall thickening.  PLEURA: No pleural effusion.  VESSELS: Tortuous aorta with aneurysm of the descending aorta measuring   up to 6 cm (25-49). There is up to 1 cm mural thrombi around the   aneurysm. There is no aortic dissection.  HEART: Heart size is normal. No pericardial effusion.  MEDIASTINUM AND CRISTOBAL: No lymphadenopathy.  CHEST WALL AND LOWER NECK: Within normal limits.    ABDOMEN AND PELVIS:  LIVER: Within normal limits.  BILE DUCTS: Normal caliber.  GALLBLADDER: Within normal limits.  SPLEEN: Within normal limits.  PANCREAS: Within normal limits.  ADRENALS: Within normal limits.  KIDNEYS/URETERS: 2.8 cm sized the right renal cyst    BLADDER: Within normal limits.  REPRODUCTIVE ORGANS: Not evaluated on axial planes.    BOWEL: No bowel obstruction. Appendix is not clearly visualized.  PERITONEUM: Noascites.  VESSELS: Tortuous intra-abdominal aorta with mild aneurysmal dilatation   of infrarenal segment. Moderate atherosclerotic change.  RETROPERITONEUM/LYMPH NODES: No lymphadenopathy.  ABDOMINAL WALL: Within normal limits.  BONES: Degenerative change. Grade 1 spondylolysis stenosis of L4 on L5.   No acute abnormality.    IMPRESSION:  Descending thoracic aneurysm with extensive mural thrombi. No aortic   dissection.    < end of copied text >      RADIOLOGY & ADDITIONAL STUDIES: reviewed  ADVANCED DIRECTIVES: FULL CODE

## 2023-10-11 NOTE — DIETITIAN INITIAL EVALUATION ADULT - PERTINENT LABORATORY DATA
10-11    140  |  104  |  19<H>  ----------------------------<  104<H>  4.0   |  29  |  1.07    Ca    9.3      11 Oct 2023 03:56  Phos  4.0     10-11  Mg     2.5     10-11    TPro  7.5  /  Alb  3.5  /  TBili  0.6  /  DBili  x   /  AST  21  /  ALT  16  /  AlkPhos  112  10-11  POCT Blood Glucose.: 147 mg/dL (10-11-23 @ 12:11)  A1C with Estimated Average Glucose Result: 5.9 % (10-09-23 @ 10:30)

## 2023-10-11 NOTE — CONSULT NOTE ADULT - CONVERSATION DETAILS
Met with the pt's spouse Joe at the bedside, discussed her current clinical condition and goals of care.  Explained Palliative Care team's role in assisting w complex decision making, communication and support.   Mr. Orourke appeared very overwhelmed, he expressed not knowing what to do.  They have been  for 49 years.    Discussed patient remains w guarded prognosis and at risk for further complications/decline.   Discussed risks/benefits of LST such as CPR/intubation in the context of advanced illness. Explained that these invasive measures may not result in any meaningful recovery.  Mr. Orourke stated he is not prepared to make any decisions.    Pt remains a FULL COD. Palliative care will follow.  Chaplaincy offered and declined at this time.  Family may benefit with psychosocial support from SW.  All questions answered.  Support provided.  d/w primary team

## 2023-10-11 NOTE — PROGRESS NOTE ADULT - SUBJECTIVE AND OBJECTIVE BOX
INTERVAL HPI/OVERNIGHT EVENTS:       PRESSORS: [ ] YES [ ] NO  WHICH:    ANTIBIOTICS:                  DATE STARTED:  ANTIBIOTICS:                  DATE STARTED:    Antimicrobial:  cefTRIAXone   IVPB        Cardiovascular:  labetalol 100 milliGRAM(s) Oral every 12 hours    Pulmonary:    Hematalogic:  aspirin  chewable 81 milliGRAM(s) Oral daily  enoxaparin Injectable 40 milliGRAM(s) SubCutaneous every 24 hours    Other:  atorvastatin 80 milliGRAM(s) Oral at bedtime  chlorhexidine 2% Cloths 1 Application(s) Topical <User Schedule>  influenza  Vaccine (HIGH DOSE) 0.7 milliLiter(s) IntraMuscular once    aspirin  chewable 81 milliGRAM(s) Oral daily  atorvastatin 80 milliGRAM(s) Oral at bedtime  cefTRIAXone   IVPB      chlorhexidine 2% Cloths 1 Application(s) Topical <User Schedule>  enoxaparin Injectable 40 milliGRAM(s) SubCutaneous every 24 hours  influenza  Vaccine (HIGH DOSE) 0.7 milliLiter(s) IntraMuscular once  labetalol 100 milliGRAM(s) Oral every 12 hours    Drug Dosing Weight  Height (cm): 149.9 (09 Oct 2023 00:21)  Weight (kg): 52 (09 Oct 2023 07:00)  BMI (kg/m2): 23.1 (09 Oct 2023 07:00)  BSA (m2): 1.46 (09 Oct 2023 07:00)    PHYSICAL EXAM:      LINES/DRAINS/DEVICES  CENTRAL LINE: [ ] YES [ ] NO  LOCATION:     MENESES: [ ] YES [ ] NO     A-LINE:  [ ] YES [ ] NO  LOCATION:       ICU Vital Signs Last 24 Hrs  T(C): 36.4 (11 Oct 2023 05:06), Max: 36.4 (11 Oct 2023 05:06)  T(F): 97.5 (11 Oct 2023 05:06), Max: 97.5 (11 Oct 2023 05:06)  HR: 58 (11 Oct 2023 08:00) (48 - 85)  BP: 138/75 (11 Oct 2023 08:00) (109/68 - 209/111)  BP(mean): 96 (11 Oct 2023 08:00) (81 - 139)  ABP: --  ABP(mean): --  RR: 9 (11 Oct 2023 08:00) (8 - 23)  SpO2: 98% (11 Oct 2023 08:00) (96% - 100%)    O2 Parameters below as of 11 Oct 2023 08:00  Patient On (Oxygen Delivery Method): room air                  10-10 @ 07:01  -  10-11 @ 07:00  --------------------------------------------------------  IN: 200 mL / OUT: 570 mL / NET: -370 mL              LABS:  CBC Full  -  ( 11 Oct 2023 03:56 )  WBC Count : 7.98 K/uL  RBC Count : 4.70 M/uL  Hemoglobin : 13.0 g/dL  Hematocrit : 41.7 %  Platelet Count - Automated : 206 K/uL  Mean Cell Volume : 88.7 fl  Mean Cell Hemoglobin : 27.7 pg  Mean Cell Hemoglobin Concentration : 31.2 gm/dL  Auto Neutrophil # : 5.75 K/uL  Auto Lymphocyte # : 1.64 K/uL  Auto Monocyte # : 0.44 K/uL  Auto Eosinophil # : 0.10 K/uL  Auto Basophil # : 0.03 K/uL  Auto Neutrophil % : 71.9 %  Auto Lymphocyte % : 20.6 %  Auto Monocyte % : 5.5 %  Auto Eosinophil % : 1.3 %  Auto Basophil % : 0.4 %    10-11    140  |  104  |  19<H>  ----------------------------<  104<H>  4.0   |  29  |  1.07    Ca    9.3      11 Oct 2023 03:56  Phos  4.0     10-11  Mg     2.5     10-11    TPro  7.5  /  Alb  3.5  /  TBili  0.6  /  DBili  x   /  AST  21  /  ALT  16  /  AlkPhos  112  10-11    PT/INR - ( 10 Oct 2023 06:00 )   PT: 11.1 sec;   INR: 0.97 ratio         PTT - ( 10 Oct 2023 06:00 )  PTT:30.4 sec  Urinalysis Basic - ( 11 Oct 2023 03:56 )    Color: x / Appearance: x / SG: x / pH: x  Gluc: 104 mg/dL / Ketone: x  / Bili: x / Urobili: x   Blood: x / Protein: x / Nitrite: x   Leuk Esterase: x / RBC: x / WBC x   Sq Epi: x / Non Sq Epi: x / Bacteria: x      Culture Results:   >100,000 CFU/ml Escherichia coli (10-09 @ 10:30)  Culture Results:   50,000 - 99,000 CFU/mL Escherichia coli (10-09 @ 01:26)      RADIOLOGY & ADDITIONAL STUDIES REVIEWED DURING TEAM ROUNDS    [ ]GOALS OF CARE DISCUSSION WITH PATIENT/FAMILY/PROXY:    CRITICAL CARE TIME SPENT: 35 minutes   INTERVAL HPI/OVERNIGHT EVENTS:   - Overnight patient was agitated, refusing po meds and given 1x dose of Zyprexa IM. This AM patient assessed at bedside, lethargic but following simple commands.      Antimicrobial:  cefTRIAXone   IVPB        Cardiovascular:  labetalol 100 milliGRAM(s) Oral every 12 hours    Pulmonary:    Hematalogic:  aspirin  chewable 81 milliGRAM(s) Oral daily  enoxaparin Injectable 40 milliGRAM(s) SubCutaneous every 24 hours    Other:  atorvastatin 80 milliGRAM(s) Oral at bedtime  chlorhexidine 2% Cloths 1 Application(s) Topical <User Schedule>  influenza  Vaccine (HIGH DOSE) 0.7 milliLiter(s) IntraMuscular once    aspirin  chewable 81 milliGRAM(s) Oral daily  atorvastatin 80 milliGRAM(s) Oral at bedtime  cefTRIAXone   IVPB      chlorhexidine 2% Cloths 1 Application(s) Topical <User Schedule>  enoxaparin Injectable 40 milliGRAM(s) SubCutaneous every 24 hours  influenza  Vaccine (HIGH DOSE) 0.7 milliLiter(s) IntraMuscular once  labetalol 100 milliGRAM(s) Oral every 12 hours    Drug Dosing Weight  Height (cm): 149.9 (09 Oct 2023 00:21)  Weight (kg): 52 (09 Oct 2023 07:00)  BMI (kg/m2): 23.1 (09 Oct 2023 07:00)  BSA (m2): 1.46 (09 Oct 2023 07:00)    PHYSICAL EXAM:  General: Patient supine in bed, lethargic, following simple commands.   HEENT: Airway patent, moist mucous membranes. PERRL.   Cardiac: RRR S1/S2 with strong peripheral pulses  Respiratory: Lung sounds clear to auscultation bilaterally.    GI: abdomen soft, non tender, non distended  Neuro: A&O x1 (person). R facial droop, RUE 1/5, RLE 2/5, LUE 4/5, LLE 4/5. Sensation to all four extremities. Dysarthric  Skin: warm, well perfused.    LINES/DRAINS/DEVICES  CENTRAL LINE: [ ] YES [X] NO       MENESES: [ ] YES [x] NO     A-LINE:  [ ] YES [X] NO      ICU Vital Signs Last 24 Hrs  T(C): 36.4 (11 Oct 2023 05:06), Max: 36.4 (11 Oct 2023 05:06)  T(F): 97.5 (11 Oct 2023 05:06), Max: 97.5 (11 Oct 2023 05:06)  HR: 58 (11 Oct 2023 08:00) (48 - 85)  BP: 138/75 (11 Oct 2023 08:00) (109/68 - 209/111)  BP(mean): 96 (11 Oct 2023 08:00) (81 - 139)  ABP: --  ABP(mean): --  RR: 9 (11 Oct 2023 08:00) (8 - 23)  SpO2: 98% (11 Oct 2023 08:00) (96% - 100%)    O2 Parameters below as of 11 Oct 2023 08:00  Patient On (Oxygen Delivery Method): room air      10-10 @ 07:01  -  10-11 @ 07:00  --------------------------------------------------------  IN: 200 mL / OUT: 570 mL / NET: -370 mL      LABS:  CBC Full  -  ( 11 Oct 2023 03:56 )  WBC Count : 7.98 K/uL  RBC Count : 4.70 M/uL  Hemoglobin : 13.0 g/dL  Hematocrit : 41.7 %  Platelet Count - Automated : 206 K/uL  Mean Cell Volume : 88.7 fl  Mean Cell Hemoglobin : 27.7 pg  Mean Cell Hemoglobin Concentration : 31.2 gm/dL  Auto Neutrophil # : 5.75 K/uL  Auto Lymphocyte # : 1.64 K/uL  Auto Monocyte # : 0.44 K/uL  Auto Eosinophil # : 0.10 K/uL  Auto Basophil # : 0.03 K/uL  Auto Neutrophil % : 71.9 %  Auto Lymphocyte % : 20.6 %  Auto Monocyte % : 5.5 %  Auto Eosinophil % : 1.3 %  Auto Basophil % : 0.4 %    10-11    140  |  104  |  19<H>  ----------------------------<  104<H>  4.0   |  29  |  1.07    Ca    9.3      11 Oct 2023 03:56  Phos  4.0     10-11  Mg     2.5     10-11    TPro  7.5  /  Alb  3.5  /  TBili  0.6  /  DBili  x   /  AST  21  /  ALT  16  /  AlkPhos  112  10-11    PT/INR - ( 10 Oct 2023 06:00 )   PT: 11.1 sec;   INR: 0.97 ratio         PTT - ( 10 Oct 2023 06:00 )  PTT:30.4 sec  Urinalysis Basic - ( 11 Oct 2023 03:56 )    Color: x / Appearance: x / SG: x / pH: x  Gluc: 104 mg/dL / Ketone: x  / Bili: x / Urobili: x   Blood: x / Protein: x / Nitrite: x   Leuk Esterase: x / RBC: x / WBC x   Sq Epi: x / Non Sq Epi: x / Bacteria: x    Culture Results:   >100,000 CFU/ml Escherichia coli (10-09 @ 10:30)  Culture Results:   50,000 - 99,000 CFU/mL Escherichia coli (10-09 @ 01:26)    RADIOLOGY & ADDITIONAL STUDIES REVIEWED DURING TEAM ROUNDS

## 2023-10-11 NOTE — PROGRESS NOTE ADULT - ASSESSMENT
78 year old female with no known PMH presents after a fall and was found to have right sided weakness with NIHHS score of 13. Symptoms have now resolved and CTH was negative for any acute infarcts but did show a possible ICA aneurysm and   CT A/P showed a large thoracic aneurysm.  Patient was found to have HTN urgency and due to the large size of the thoracic aneurysm,  will admit to ICU for closer BP monitoring with labetalol gtt.     #HTN Urgency  #TIA  #Thoracic aneurysm (6cm)  #ICA Aneurysm    Neuro  # CVA unknown last known well (NIHHS 13 on admission) wit right sided deficits  - CTH 10/8 with no acute ischemia or hemorrhage. 4 mm outpouching projecting posteriorly inferiorly off the right supraclinoid internal carotid artery at its terminus, concerning for aneurysm.  -TNK not given due to out of window  - continue with neuro checks  - Official SLP done and patient with concern for oropharyngeal dysphagia started on puree diet with mildly thick liquids.   - pending ECHO w/ bubble for stroke work-up  - pending repeat imaging    #ICA Aneurysm  - CTH shows 4 mm outpouching projecting posteriorly inferiorly off the right  supraclinoid internal carotid artery at its terminus, concerning for aneurysm  -neuro checks per protocol  - Neurology, Dr. Briseno consulted and following.    Cardiovascular  # Hypertensive Emergency ('s)  # 6mm Descending Aortic Aneurysm   #Hyperlipidemia  - EKG NSR , trop x1 negative   - s/p labetalol 10mg IV  - Per Neuro, no role for permissive hypertension - Goal /80, Treat BP > 140/90  - Changed Labetalol 100mg BID with hold parameters  - Added Amlodipine 5mg qd  - Atorvastatin 80mg qd  - Started ASA 81mg per neuro    # Thoracic Aneurysm  -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   - no evidence of aortic dissection  - Goal /80 per Neuro  - CT Surgery consulted and not offering any acute surgical interventions. Dr. Brooks to place official recs. Per our conversation, heparin gtt to be discontinued given possible chronicity of aneurysm and mural thrombi. Requesting 2 month outpatient follow-up with CT surg.     # Mural Thrombi  -1 cm mural thrombi around the aneurysm  - if no surgical intervention for aneurysm, consider starting AC, as patient may have had embolic CVA  - Per Neurology, mural thrombi likely not the nidus for CVA given location.     Pulm  # No issues     ID  # No issues     Nephro  # Renal Cyst  - asymptomatic   - incidentally found to have a 2.8 cm sized the right renal cyst    GI  #Dysphagia  -  Pureed diet and moderately thickened liquids, seen by SLP.     Heme  #Per neuro, ok to start DVT ppx.      Endocrine  # Pre-Diabetes (A1C 5.9)  - Will monitor serum glucose    Skin/Catheters  - Peripheral IV lines     Prophylaxis   - DVT prophylaxis : Lovenox   - SCDs    [facility]  FULL CODE   Disposition : DG to tele       78 year old female with no known PMH presents after a fall and was found to have right sided weakness with NIHHS score of 13. Symptoms have now resolved and CTH was negative for any acute infarcts but did show a possible ICA aneurysm and   CT A/P showed a large thoracic aneurysm.  Patient was found to have HTN urgency and due to the large size of the thoracic aneurysm,  will admit to ICU for closer BP monitoring with labetalol gtt.     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 4 mm outpouching projecting posteriorly inferiorly off the right supraclinoid internal carotid artery at its terminus, concerning for aneurysm.  - continue with neuro checks  - Official SLP done and patient with concern for oropharyngeal dysphagia started on puree diet with mildly thick liquids.   - ECHO bubble study neg.  - Per Neuro, MRI to be repeated once patient is downgraded.    #ICA Aneurysm  - CTH shows 4 mm outpouching projecting posteriorly inferiorly off the right  supraclinoid internal carotid artery at its terminus, concerning for aneurysm  -neuro checks per protocol  - Neurology, Dr. Briseno consulted and following.    Cardiovascular  # Hypertensive Emergency ('s)  # 6mm Descending Aortic Aneurysm   #Hyperlipidemia  - EKG NSR , trop x1 negative   - s/p labetalol 10mg IV  - Per Neuro, no role for permissive hypertension - Goal /80, Treat BP > 140/90  - Changed Labetalol 100mg BID with hold parameters  - Increased Amlodipine to 10mg qd via NGT  - Atorvastatin 80mg qd  - Continue ASA 81mg    # Thoracic Aneurysm  -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   - no evidence of aortic dissection  - Goal /80 per Neuro  - CT Surgery consulted and not offering any acute surgical interventions. Dr. Brooks to place official recs. Per our conversation, heparin gtt to be discontinued given possible chronicity of aneurysm and mural thrombi. Requesting 2 month outpatient follow-up with CT surg.     # Mural Thrombi  -1 cm mural thrombi around the aneurysm  - if no surgical intervention for aneurysm, consider starting AC, as patient may have had embolic CVA  - Per Neurology, mural thrombi likely not the nidus for CVA given location.     Pulm  # No issues     ID  #UTI   - Positive UA and UCx with E.Coli  - Started on 1g Ceftriaxone for 5 days (10/11- )    Nephro  # Renal Cyst  - asymptomatic   - incidentally found to have a 2.8 cm sized the right renal cyst    GI  #Dysphagia  - Pureed diet and moderately thickened liquids, seen by SLP.   - Patient refusing PO, NGT placed today. Chest xray ordered for confirmation.      Heme  #Per neuro, ok to start DVT ppx.      Endocrine  # Pre-Diabetes (A1C 5.9)  - episode of hypoglycemia today, f.s. 64 and given 1 amp of dextrose.   - Started on D5LR at 75cc/hr iso of decreased oral intake     Skin/Catheters  - Peripheral IV lines     Prophylaxis   - DVT prophylaxis : Lovenox   - [facility]  FULL CODE   Disposition : DG to tele

## 2023-10-11 NOTE — PROGRESS NOTE ADULT - NS ATTEND AMEND GEN_ALL_CORE FT
78 year old female with no known PMH presents after a fall and was found to have right sided weakness with NIHHS score of 13. CTH was negative for any acute infarcts but did show a possible ICA aneurysm and   CT A/P showed a large thoracic aneurysm. She was ruled out for thrombolytic therapy as last known time was unknown. Patient was found to have HTN urgency and due to the large size of the thoracic aneurysm,  will admit to ICU for closer BP monitoring with labetalol gtt.     Assessment:  1. Acute ischemic stroke  2. Hypertensive emergency   3. 6cm descending thoracic aneurysm   4. Aortic mural thrombus   5. ICA Aneurysm    Plan:  - Reported with episodes of hyperactive delirium overnight   - Start low dose zyprexa at bedtime   - remains with right hemiparesis  - CT surgery recs noted, no surgical intervention for thoracic aneurysm as no evidence of disection  - No indication for anticoagulation at this time   - Cont. statin  - Cont. on aspirin   - Neurology recs noted  - oral diet   - BP control with oral agents   - PT evaluation   - Transfer out of ICU
78 year old female with no known PMH presents after a fall and was found to have right sided weakness with NIHHS score of 13. CTH was negative for any acute infarcts but did show a possible ICA aneurysm and   CT A/P showed a large thoracic aneurysm. She was ruled out for thrombolytic therapy as last known time was unknown. Patient was found to have HTN urgency and due to the large size of the thoracic aneurysm,  will admit to ICU for closer BP monitoring with labetalol gtt.     Assessment:  1. Acute ischemic stroke  2. Hypertensive emergency   3. 6cm descending thoracic aneurysm   4. Aortic mural thrombus   5. ICA Aneurysm    Plan:  - remains with right hemiparesis  - CT surgery recs noted, no surgical intervention for thoracic aneurysm as no evidence of disection  - No indication for anticoagulation at this time   - Cont. statin  - Started on aspirin   - Neurology recs noted  - oral diet   - BP control with oral agents   - PT evaluation   - Transfer out of ICU

## 2023-10-12 LAB
-  AMIKACIN: SIGNIFICANT CHANGE UP
-  AMOXICILLIN/CLAVULANIC ACID: SIGNIFICANT CHANGE UP
-  AMPICILLIN/SULBACTAM: SIGNIFICANT CHANGE UP
-  AMPICILLIN: SIGNIFICANT CHANGE UP
-  AZTREONAM: SIGNIFICANT CHANGE UP
-  CEFAZOLIN: SIGNIFICANT CHANGE UP
-  CEFEPIME: SIGNIFICANT CHANGE UP
-  CEFOXITIN: SIGNIFICANT CHANGE UP
-  CEFTRIAXONE: SIGNIFICANT CHANGE UP
-  CEFUROXIME: SIGNIFICANT CHANGE UP
-  CIPROFLOXACIN: SIGNIFICANT CHANGE UP
-  ERTAPENEM: SIGNIFICANT CHANGE UP
-  GENTAMICIN: SIGNIFICANT CHANGE UP
-  IMIPENEM: SIGNIFICANT CHANGE UP
-  LEVOFLOXACIN: SIGNIFICANT CHANGE UP
-  MEROPENEM: SIGNIFICANT CHANGE UP
-  NITROFURANTOIN: SIGNIFICANT CHANGE UP
-  PIPERACILLIN/TAZOBACTAM: SIGNIFICANT CHANGE UP
-  TOBRAMYCIN: SIGNIFICANT CHANGE UP
-  TRIMETHOPRIM/SULFAMETHOXAZOLE: SIGNIFICANT CHANGE UP
ALBUMIN SERPL ELPH-MCNC: 3.2 G/DL — LOW (ref 3.5–5)
ALP SERPL-CCNC: 104 U/L — SIGNIFICANT CHANGE UP (ref 40–120)
ALT FLD-CCNC: 12 U/L DA — SIGNIFICANT CHANGE UP (ref 10–60)
ANION GAP SERPL CALC-SCNC: 6 MMOL/L — SIGNIFICANT CHANGE UP (ref 5–17)
AST SERPL-CCNC: 12 U/L — SIGNIFICANT CHANGE UP (ref 10–40)
BASOPHILS # BLD AUTO: 0.03 K/UL — SIGNIFICANT CHANGE UP (ref 0–0.2)
BASOPHILS NFR BLD AUTO: 0.4 % — SIGNIFICANT CHANGE UP (ref 0–2)
BILIRUB SERPL-MCNC: 0.4 MG/DL — SIGNIFICANT CHANGE UP (ref 0.2–1.2)
BUN SERPL-MCNC: 25 MG/DL — HIGH (ref 7–18)
CALCIUM SERPL-MCNC: 9 MG/DL — SIGNIFICANT CHANGE UP (ref 8.4–10.5)
CHLORIDE SERPL-SCNC: 106 MMOL/L — SIGNIFICANT CHANGE UP (ref 96–108)
CO2 SERPL-SCNC: 29 MMOL/L — SIGNIFICANT CHANGE UP (ref 22–31)
CREAT SERPL-MCNC: 1.13 MG/DL — SIGNIFICANT CHANGE UP (ref 0.5–1.3)
CULTURE RESULTS: SIGNIFICANT CHANGE UP
EGFR: 50 ML/MIN/1.73M2 — LOW
EOSINOPHIL # BLD AUTO: 0.1 K/UL — SIGNIFICANT CHANGE UP (ref 0–0.5)
EOSINOPHIL NFR BLD AUTO: 1.2 % — SIGNIFICANT CHANGE UP (ref 0–6)
GLUCOSE SERPL-MCNC: 95 MG/DL — SIGNIFICANT CHANGE UP (ref 70–99)
HCT VFR BLD CALC: 40 % — SIGNIFICANT CHANGE UP (ref 34.5–45)
HGB BLD-MCNC: 12.6 G/DL — SIGNIFICANT CHANGE UP (ref 11.5–15.5)
IMM GRANULOCYTES NFR BLD AUTO: 0.4 % — SIGNIFICANT CHANGE UP (ref 0–0.9)
LYMPHOCYTES # BLD AUTO: 2.58 K/UL — SIGNIFICANT CHANGE UP (ref 1–3.3)
LYMPHOCYTES # BLD AUTO: 30.3 % — SIGNIFICANT CHANGE UP (ref 13–44)
MAGNESIUM SERPL-MCNC: 2.5 MG/DL — SIGNIFICANT CHANGE UP (ref 1.6–2.6)
MCHC RBC-ENTMCNC: 28.3 PG — SIGNIFICANT CHANGE UP (ref 27–34)
MCHC RBC-ENTMCNC: 31.5 GM/DL — LOW (ref 32–36)
MCV RBC AUTO: 89.9 FL — SIGNIFICANT CHANGE UP (ref 80–100)
METHOD TYPE: SIGNIFICANT CHANGE UP
MONOCYTES # BLD AUTO: 0.73 K/UL — SIGNIFICANT CHANGE UP (ref 0–0.9)
MONOCYTES NFR BLD AUTO: 8.6 % — SIGNIFICANT CHANGE UP (ref 2–14)
NEUTROPHILS # BLD AUTO: 5.04 K/UL — SIGNIFICANT CHANGE UP (ref 1.8–7.4)
NEUTROPHILS NFR BLD AUTO: 59.1 % — SIGNIFICANT CHANGE UP (ref 43–77)
NRBC # BLD: 0 /100 WBCS — SIGNIFICANT CHANGE UP (ref 0–0)
ORGANISM # SPEC MICROSCOPIC CNT: SIGNIFICANT CHANGE UP
ORGANISM # SPEC MICROSCOPIC CNT: SIGNIFICANT CHANGE UP
PHOSPHATE SERPL-MCNC: 4 MG/DL — SIGNIFICANT CHANGE UP (ref 2.5–4.5)
PLATELET # BLD AUTO: 201 K/UL — SIGNIFICANT CHANGE UP (ref 150–400)
POTASSIUM SERPL-MCNC: 4 MMOL/L — SIGNIFICANT CHANGE UP (ref 3.5–5.3)
POTASSIUM SERPL-SCNC: 4 MMOL/L — SIGNIFICANT CHANGE UP (ref 3.5–5.3)
PROT SERPL-MCNC: 7.1 G/DL — SIGNIFICANT CHANGE UP (ref 6–8.3)
RBC # BLD: 4.45 M/UL — SIGNIFICANT CHANGE UP (ref 3.8–5.2)
RBC # FLD: 13.4 % — SIGNIFICANT CHANGE UP (ref 10.3–14.5)
SODIUM SERPL-SCNC: 141 MMOL/L — SIGNIFICANT CHANGE UP (ref 135–145)
SPECIMEN SOURCE: SIGNIFICANT CHANGE UP
WBC # BLD: 8.51 K/UL — SIGNIFICANT CHANGE UP (ref 3.8–10.5)
WBC # FLD AUTO: 8.51 K/UL — SIGNIFICANT CHANGE UP (ref 3.8–10.5)

## 2023-10-12 PROCEDURE — 99291 CRITICAL CARE FIRST HOUR: CPT

## 2023-10-12 RX ORDER — SENNA PLUS 8.6 MG/1
10 TABLET ORAL ONCE
Refills: 0 | Status: COMPLETED | OUTPATIENT
Start: 2023-10-12 | End: 2023-10-12

## 2023-10-12 RX ORDER — OLANZAPINE 15 MG/1
2.5 TABLET, FILM COATED ORAL ONCE
Refills: 0 | Status: DISCONTINUED | OUTPATIENT
Start: 2023-10-12 | End: 2023-10-13

## 2023-10-12 RX ORDER — POLYETHYLENE GLYCOL 3350 17 G/17G
17 POWDER, FOR SOLUTION ORAL ONCE
Refills: 0 | Status: COMPLETED | OUTPATIENT
Start: 2023-10-12 | End: 2023-10-12

## 2023-10-12 RX ADMIN — Medication 100 MILLIGRAM(S): at 06:36

## 2023-10-12 RX ADMIN — SENNA PLUS 10 MILLILITER(S): 8.6 TABLET ORAL at 18:03

## 2023-10-12 RX ADMIN — POLYETHYLENE GLYCOL 3350 17 GRAM(S): 17 POWDER, FOR SOLUTION ORAL at 18:04

## 2023-10-12 RX ADMIN — Medication 81 MILLIGRAM(S): at 12:33

## 2023-10-12 RX ADMIN — CHLORHEXIDINE GLUCONATE 1 APPLICATION(S): 213 SOLUTION TOPICAL at 06:05

## 2023-10-12 RX ADMIN — CEFTRIAXONE 100 MILLIGRAM(S): 500 INJECTION, POWDER, FOR SOLUTION INTRAMUSCULAR; INTRAVENOUS at 02:05

## 2023-10-12 RX ADMIN — Medication 100 MILLIGRAM(S): at 18:04

## 2023-10-12 RX ADMIN — ENOXAPARIN SODIUM 40 MILLIGRAM(S): 100 INJECTION SUBCUTANEOUS at 18:04

## 2023-10-12 RX ADMIN — AMLODIPINE BESYLATE 10 MILLIGRAM(S): 2.5 TABLET ORAL at 06:05

## 2023-10-12 NOTE — PROGRESS NOTE ADULT - CRITICAL CARE ATTENDING COMMENT
I counseled the primary team about the medications to maintain for secondary stroke prevention in the patient with chronic ischemic strokes.

## 2023-10-12 NOTE — PROGRESS NOTE ADULT - SUBJECTIVE AND OBJECTIVE BOX
NEUROLOGY FOLLOW-UP NOTE    NAME:  CINTIA PHIPPS      ASSESSMENT:  78 RHF with short-term memory loss and recent fall without loss of consciousness or seizures, concerning for syncopal event; also with chronic lacunar infarct and presenting with transient right-sided weakness, concerning for transient ischemic attack      RECOMMENDATIONS:    - Continue Telemetry monitoring and follow-up recommendations from Cardiothoracic Surgery consult    - Monitor orthostatic BP & HR with each vital signs check    - Plentiful fluid hydration (2 liters, or 8 glasses, of water daily) encouraged    - Patient should maintain caution with rapid changes in position (currently bedbound while in ICU)    - Maintain Atorvastatin 80mg PO QHS (goal LDL < 70 mg/dL) and Aspirin 81mg PO Daily for secondary stroke prevention for chronic lacunar infarct    - No role for permissive hypertension - Goal /80, Treat BP > 140/90    - Maintain SBP > 100 mmHg    - MRI Brain without contrast approved (if there are no contraindications) to evaluate for minor ischemic stroke    - PT/OT    - DVT ppx: SCDs, Enoxaparin (therapeutic anticoagulation not needed from a neurological perspective)          NOTE TO BE COMPLETED - PLEASE REFER TO ABOVE ONLY AND IGNORE INFORMATION BELOW    ******************************    HPI:  78 year old female with no known PMH presents after a fall. According to  at bedside, patient was found on the floor and was unable to move her right upper/lower extremities. Denies any LOC, seizure like activity, or urinary incontinence  states that patient has been getting increasingly forgetful for the past few months, and has not seen a physician in over a year. Patient states she does not remember the fall, and currently has no complaints. Denies any headaches dizziness, fever, chills, visual changes, weakness, numbness, tingling chest pain, SOB, abdominal pain, or change in bowel habits.     In the ED:  Afebrile, /148, HR 85, 95% on RA  NIHHS 13  CTH/Angio showed old infarcts w/ 4mm ICA outpouch concerning for aneurysm  s/p labetalol 10mg IV + nicardipine gtt         (09 Oct 2023 03:21)      NEURO HPI:      INTERVAL HISTORY:      MEDICATIONS:  amLODIPine   Tablet 10 milliGRAM(s) Oral daily  aspirin  chewable 81 milliGRAM(s) Oral daily  atorvastatin 80 milliGRAM(s) Oral at bedtime  cefTRIAXone   IVPB 1000 milliGRAM(s) IV Intermittent every 24 hours  chlorhexidine 2% Cloths 1 Application(s) Topical <User Schedule>  enoxaparin Injectable 40 milliGRAM(s) SubCutaneous every 24 hours  influenza  Vaccine (HIGH DOSE) 0.7 milliLiter(s) IntraMuscular once  labetalol 100 milliGRAM(s) Oral every 12 hours  OLANZapine 5 milliGRAM(s) Oral at bedtime      ALLERGIES:  No Known Allergies      REVIEW OF SYSTEMS:  Fourteen systems reviewed and negative except as in HPI / Interval History.          OBJECTIVE:  Vital Signs Last 24 Hrs  T(C): 36.9 (12 Oct 2023 12:00), Max: 37 (11 Oct 2023 23:00)  T(F): 98.4 (12 Oct 2023 12:00), Max: 98.6 (11 Oct 2023 23:00)  HR: 73 (12 Oct 2023 13:00) (61 - 78)  BP: 123/83 (12 Oct 2023 13:00) (107/66 - 162/92)  BP(mean): 97 (12 Oct 2023 13:00) (75 - 126)  RR: 17 (12 Oct 2023 13:00) (10 - 23)  SpO2: 100% (12 Oct 2023 13:00) (98% - 100%)    Parameters below as of 12 Oct 2023 13:00  Patient On (Oxygen Delivery Method): room air        General Examination:  General: No acute distress  HEENT: Atraumatic, Normocephalic  Respiratory: CTA B/l.  No crackles, rhonchi, or wheezes.  Cardiovascular: RRR.  Normal S1 & S2.  Normal b/l radial and pedal pulses.    Neurological Examination:  General / Mental Status: AAO x 3.  No aphasia or dysarthria.  Naming and repetition intact.  Cranial Nerves: VFF x 4.  PERRL.  EOMI x 2, No nystagmus or diplopia.  B/l V1-V3 equal and intact to light touch and pinprick.  Symmetric facial movement and palate elevation.  B/l hearing equal to finger rub.  5/5 strength with b/l sternocleidomastoid and trapezius.  Midline tongue protrusion, with no atrophy or fasciculations.  Motor: Normal bulk & tone in all four extremities.  5/5 strength throughout all four extremities.  No downward drift, rigidity, spasticity, or tremors in any of the four extremities.  Sensory: Intact to light touch and pinprick in all four extremities.  Negative Romberg.  Reflex: 2+ and symmetric at b/l biceps, triceps, brachioradialis, patellae, and ankles.  Downgoing toes b/l.  Coordination: No dysmetria with b/l finger-to-nose and heel raise tests.  Symmetric rapid alternating movements b/l.  Gait: Normal, narrow-based gait.  No difficulty with tiptoe, heel, and tandem gaits.        LABORATORY VALUES:                          12.6   8.51  )-----------( 201      ( 12 Oct 2023 03:49 )             40.0       10-12    141  |  106  |  25<H>  ----------------------------<  95  4.0   |  29  |  1.13    Ca    9.0      12 Oct 2023 03:49  Phos  4.0     10-12  Mg     2.5     10-12    TPro  7.1  /  Alb  3.2<L>  /  TBili  0.4  /  DBili  x   /  AST  12  /  ALT  12  /  AlkPhos  104  10-12    10-09 Chol 216<H> LDL -- HDL 60 Trig 91    Glucose Trend  10-12-23 @ 03:49   -  95 -- --  10-11-23 @ 17:21   -  -- -- 236<H>  10-11-23 @ 16:22   -  -- -- 76  10-11-23 @ 12:11   -  -- -- 147<H>  10-11-23 @ 11:47   -  -- -- 64<L>  10-11-23 @ 11:05   -  -- -- 73  10-11-23 @ 03:56   -  104<H> -- --  10-10-23 @ 06:00   -  120<H> -- --                    NEUROIMAGING:          Please contact the Neurology consult service with any neurological questions.      Finn Morgan MD   of Neurology  [facility] [employer] [facility] at [facility]         NEUROLOGY FOLLOW-UP NOTE    NAME:  CINTIA PHIPPS      ASSESSMENT:  78 RHF with short-term memory loss and recent fall without loss of consciousness or seizures, concerning for syncopal event; also with chronic lacunar infarct and presenting with transient right-sided weakness, concerning for transient ischemic attack      RECOMMENDATIONS:    - Continue Telemetry monitoring and follow-up recommendations from Cardiothoracic Surgery consult    - Monitor orthostatic BP & HR with each vital signs check    - Plentiful fluid hydration (2 liters, or 8 glasses, of water daily) encouraged    - Patient should maintain caution with rapid changes in position (currently bedbound while in ICU)    - Maintain Atorvastatin 80mg PO QHS (goal LDL < 70 mg/dL) and Aspirin 81mg PO Daily for secondary stroke prevention for chronic lacunar infarct    - No role for permissive hypertension - Goal /80, Treat BP > 140/90    - Maintain SBP > 100 mmHg    - MRI Brain without contrast approved (if there are no contraindications) to evaluate for minor ischemic stroke    - PT/OT    - DVT ppx: SCDs, Enoxaparin (therapeutic anticoagulation not needed from a neurological perspective)          ******************************    HPI:  78 year old female with no known PMH presents after a fall. According to  at bedside, patient was found on the floor and was unable to move her right upper/lower extremities. Denies any LOC, seizure like activity, or urinary incontinence.  states that patient has been getting increasingly forgetful for the past few months, and has not seen a physician in over a year. Patient states she does not remember the fall, and currently has no complaints. Denies any headaches dizziness, fever, chills, visual changes, weakness, numbness, tingling chest pain, SOB, abdominal pain, or change in bowel habits.   In the ED:  Afebrile, /148, HR 85, 95% on RA  NIHHS 13  CTH/Angio showed old infarcts w/ 4mm ICA outpouch concerning for aneurysm  s/p labetalol 10mg IV + nicardipine gtt (09 Oct 2023 03:21)      NEURO HPI:  78F with short-term memory loss over the past few months, who was brought to the Emergency Department after being found down on the floor with inability to move her right arm and right leg.      INTERVAL HISTORY:  The patient has not developed any new stroke-like symptoms or episodes of loss of consciousness overnight.      MEDICATIONS:  amLODIPine   Tablet 10 milliGRAM(s) Oral daily  aspirin  chewable 81 milliGRAM(s) Oral daily  atorvastatin 80 milliGRAM(s) Oral at bedtime  cefTRIAXone   IVPB 1000 milliGRAM(s) IV Intermittent every 24 hours  chlorhexidine 2% Cloths 1 Application(s) Topical <User Schedule>  enoxaparin Injectable 40 milliGRAM(s) SubCutaneous every 24 hours  influenza  Vaccine (HIGH DOSE) 0.7 milliLiter(s) IntraMuscular once  labetalol 100 milliGRAM(s) Oral every 12 hours  OLANZapine 5 milliGRAM(s) Oral at bedtime      ALLERGIES:  No Known Allergies      REVIEW OF SYSTEMS:  Fourteen systems reviewed and negative or unobtainable except as in HPI / Interval History.          OBJECTIVE:  Vital Signs Last 24 Hrs  T(C): 36.9 (12 Oct 2023 12:00), Max: 37 (11 Oct 2023 23:00)  T(F): 98.4 (12 Oct 2023 12:00), Max: 98.6 (11 Oct 2023 23:00)  HR: 73 (12 Oct 2023 13:00) (61 - 78)  BP: 123/83 (12 Oct 2023 13:00) (107/66 - 162/92)  BP(mean): 97 (12 Oct 2023 13:00) (75 - 126)  RR: 17 (12 Oct 2023 13:00) (10 - 23)  SpO2: 100% (12 Oct 2023 13:00) (98% - 100%)  Parameters below as of 12 Oct 2023 13:00  Patient On (Oxygen Delivery Method): room air      General Examination:  General: No acute distress  HEENT: Atraumatic, Normocephalic  Respiratory: CTA B/l.  No crackles, rhonchi, or wheezes.  Cardiovascular: RRR.  Normal S1 & S2.  Normal b/l radial and pedal pulses.    Neurological Examination:  General / Mental Status: AAO x 1 (only to person).  Minimally verbal, with no apparent aphasia or dysarthria.  Cranial Nerves: B/l blink to threat present.  PERRL.  EOMI x 2, No nystagmus.  B/l V1-V3 equal and intact to light touch and pinprick.  Symmetric facial movement and palate elevation.  B/l hearing diminished to finger rub.  At least 3/5 strength with b/l sternocleidomastoid and trapezius, limited by poor effort.  Midline tongue protrusion.  Motor: Diminished bulk & tone in all four extremities.  At least 3/5 strength throughout all four extremities, all of which drift downward to bed, limited by poor effort.  No rigidity, spasticity, or tremors in any of the four extremities.  Sensory: Intact to light touch and pinprick in all four extremities.  Reflex: 1+ and symmetric at b/l biceps, triceps, brachioradialis, patellae, and ankles.  Mute toes b/l.  Unable to assess coordination, gait, or Romberg testing in patient due to poor cooperation with exam.      NIHSS Score:    LOC - 0  LOC Questions - 1  LOC Commands - 1  Gaze Preference - 0  Visual Fields - 0  Facial Palsy - 0  Motor Arm Left - 2  Motor Arm Right - 2  Motor Leg Left - 2  Motor Leg Right - 2  Limb Ataxia - UN  Sensory - 0  Language - 0  Speech - 0  Extinction - UN    NIHSS Score Total: 10 - No IV TNK because of uncertain last seen normal time    Modified Krystyna Scale: 4          LABORATORY VALUES:                          12.6   8.51  )-----------( 201      ( 12 Oct 2023 03:49 )             40.0       10-12    141  |  106  |  25<H>  ----------------------------<  95  4.0   |  29  |  1.13    Ca    9.0      12 Oct 2023 03:49  Phos  4.0     10-12  Mg     2.5     10-12    TPro  7.1  /  Alb  3.2<L>  /  TBili  0.4  /  DBili  x   /  AST  12  /  ALT  12  /  AlkPhos  104  10-12    10-09 Chol 216<H> <H> HDL 60 Trig 91    Glucose Trend  10-12-23 @ 03:49   -  95 -- --  10-11-23 @ 17:21   -  -- -- 236<H>  10-11-23 @ 16:22   -  -- -- 76  10-11-23 @ 12:11   -  -- -- 147<H>  10-11-23 @ 11:47   -  -- -- 64<L>  10-11-23 @ 11:05   -  -- -- 73  10-11-23 @ 03:56   -  104<H> -- --  10-10-23 @ 06:00   -  120<H> -- --    A1C with Estimated Average Glucose (10.09.23 @ 10:30)   A1C with Estimated Average Glucose Result: 5.9%  Estimated Average Glucose: 123 mg/dL          NEUROIMAGING:      CT Head & CTA Head/Neck & CT Perfusion Head (10/9/23):  - No acute intracranial abnormality  - Chronic left frontal and right basal ganglia infarcts  - Chronic microvascular changes  - Mild diffuse atrophy  - No intracranial or neck large vessel cutoff  - Right supraclinoid ICA posteriorly projecting aneurysm  - No focal hypoperfusion      TTE x 2 (10/9/23 & 10/10/23):  - LVEF 55-60%  - Severe concentric LV hypertrophy  - Grade I (mild) diastolic dysfunction  - Moderate tricuspid regurgitation  - No cardiac thrombus or intracardiac shunt          Please contact the Neurology consult service with any neurological questions.      Finn Morgan MD   of Neurology  [facility] [employer] [facility] at [facility]

## 2023-10-12 NOTE — PROGRESS NOTE ADULT - ASSESSMENT
78 year old female with no known PMH presents after a fall and was found to have right sided weakness with NIHHS score of 13. Symptoms have now resolved and CTH was negative for any acute infarcts but did show a possible ICA aneurysm and   CT A/P showed a large thoracic aneurysm.  Patient was found to have HTN urgency and due to the large size of the thoracic aneurysm,  will admit to ICU for closer BP monitoring with labetalol gtt.     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 4 mm outpouching projecting posteriorly inferiorly off the right supraclinoid internal carotid artery at its terminus, concerning for aneurysm.  - continue with neuro checks  - Official SLP done and patient with concern for oropharyngeal dysphagia started on puree diet with mildly thick liquids.   - ECHO bubble study neg.  - Per Neuro, MRI to be repeated once patient is downgraded.    #ICA Aneurysm  - CTH shows 4 mm outpouching projecting posteriorly inferiorly off the right  supraclinoid internal carotid artery at its terminus, concerning for aneurysm  -neuro checks per protocol  - Neurology, Dr. Briseno consulted and following.    Cardiovascular  # Hypertensive Emergency ('s)  # 6mm Descending Aortic Aneurysm   #Hyperlipidemia  - EKG NSR , trop x1 negative   - s/p labetalol 10mg IV  - Per Neuro, no role for permissive hypertension - Goal /80, Treat BP > 140/90  - Changed Labetalol 100mg BID with hold parameters  - Increased Amlodipine to 10mg qd via NGT  - Atorvastatin 80mg qd  - Continue ASA 81mg    # Thoracic Aneurysm  -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   - no evidence of aortic dissection  - Goal /80 per Neuro  - CT Surgery consulted and not offering any acute surgical interventions. Dr. Brooks to place official recs. Per our conversation, heparin gtt to be discontinued given possible chronicity of aneurysm and mural thrombi. Requesting 2 month outpatient follow-up with CT surg.     # Mural Thrombi  -1 cm mural thrombi around the aneurysm  - if no surgical intervention for aneurysm, consider starting AC, as patient may have had embolic CVA  - Per Neurology, mural thrombi likely not the nidus for CVA given location.     Pulm  # No issues     ID  #UTI   - Positive UA and UCx with E.Coli  - Started on 1g Ceftriaxone for 5 days (10/11- )    Nephro  # Renal Cyst  - asymptomatic   - incidentally found to have a 2.8 cm sized the right renal cyst    GI  #Dysphagia  - Pureed diet and moderately thickened liquids, seen by SLP.   - Patient refusing PO, NGT placed today. Chest xray ordered for confirmation.      Heme  #Per neuro, ok to start DVT ppx.      Endocrine  # Pre-Diabetes (A1C 5.9)  - episode of hypoglycemia today, f.s. 64 and given 1 amp of dextrose.   - Started on D5LR at 75cc/hr iso of decreased oral intake     Skin/Catheters  - Peripheral IV lines     Prophylaxis   - DVT prophylaxis : Lovenox   - [facility]  FULL CODE   Disposition : DG to tele       78 year old female with no known PMH presents after a fall and was found to have right sided weakness with NIHHS score of 13. Symptoms have now resolved and CTH was negative for any acute infarcts but did show a possible ICA aneurysm and   CT A/P showed a large thoracic aneurysm.  Patient was found to have HTN urgency and due to the large size of the thoracic aneurysm,  will admit to ICU for closer BP monitoring with labetalol gtt.     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 4 mm outpouching projecting posteriorly inferiorly off the right supraclinoid internal carotid artery at its terminus, concerning for aneurysm.  - continue with neuro checks  - Official SLP done and patient with concern for oropharyngeal dysphagia started on puree diet with mildly thick liquids.   - ECHO bubble study neg.  - Per Neuro, MRI to be repeated once patient is downgraded.    #ICA Aneurysm  - CTH shows 4 mm outpouching projecting posteriorly inferiorly off the right  supraclinoid internal carotid artery at its terminus, concerning for aneurysm  -neuro checks per protocol  - Neurology, Dr. Briseno consulted and following.    Cardiovascular  # Hypertensive Emergency ('s)  # 6mm Descending Aortic Aneurysm   #Hyperlipidemia  - EKG NSR , trop x1 negative   - s/p labetalol 10mg IV  - Per Neuro, no role for permissive hypertension - Goal /80, Treat BP > 140/90  - Changed Labetalol 100mg BID with hold parameters  - Increased Amlodipine to 10mg qd via NGT  - Atorvastatin 80mg qd  - Continue ASA 81mg    # Thoracic Aneurysm  -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   - no evidence of aortic dissection  - Goal /80 per Neuro  - CT Surgery consulted and not offering any acute surgical interventions. Dr. Brooks to place official recs. Per our conversation, heparin gtt to be discontinued given possible chronicity of aneurysm and mural thrombi. Requesting 2 month outpatient follow-up with CT surg.     # Mural Thrombi  -1 cm mural thrombi around the aneurysm  - if no surgical intervention for aneurysm, consider starting AC, as patient may have had embolic CVA  - Per Neurology, mural thrombi likely not the nidus for CVA given location.     Pulm  # No issues     ID  #UTI   - Positive UA and UCx with E.Coli  - Started on 1g Ceftriaxone for 5 days till 10/15 ( D 2/5)    Nephro  # Renal Cyst  - asymptomatic   - incidentally found to have a 2.8 cm sized the right renal cyst    GI  #Dysphagia  - Pureed diet and moderately thickened liquids, seen by SLP.   - Patient refusing PO, NGT placed 10/11- patient pulled out NGT this morning     Heme  #Per neuro, ok to start DVT ppx- c/w lovenox sc daily    Endocrine  # Pre-Diabetes (A1C 5.9)  - episode of hypoglycemia 10/11- f.s. 64 and given 1 amp of dextrose.   - s/p D5LR at 75cc/hr ivo of decreased oral intake   - Patient tolerating oral diet today    Skin/Catheters  - Peripheral IV lines     Prophylaxis   - DVT prophylaxis : Lovenox   - SCDs    [facility]  FULL CODE , palliative care following  Disposition : DG to tele

## 2023-10-12 NOTE — CHART NOTE - NSCHARTNOTEFT_GEN_A_CORE
78 year old female with no known PMH presents after being found on the floor by her  and noted to have right sided weakness. On arrival to ED, stroke team was activated with patient scoring 13 on NIHHS. Patient was not a TNK candidate given her unknown last known well. Initial CT head was negative for any acute infarcts but did show a possible ICA aneurysm and CT A/P showed a large thoracic aneurysm. Pt was admitted to ICU for BP monitoring given SBP in the 200's where she was started on a labetalol drip. On exam patient with persistent RUE and RLE paralysis, lethargy, intermittent confusion and agitation. On 10/9 an official speech and swallow was done where patient was found to have dysphagia and started on a pureed diet with mildly thickened liquids. Overnight patient was given IM Zyprexa for worsening agitation. On 10/10, a cardiothoracic surgery and neurology consult was placed at [facility] for evaluation of descending aortic aneurysm with mural thrombus and an internal carotid aneurysm. Pt was not accepted given that there was no acute surgical intervention being offered. Per CT surgery, patient would require a 2 month follow-up at an outpatient clinic. Patient had been started on a heparin gtt but per their recs, it was discontinued. Per Dr. Briseno,  Neuro, BP goal 120/80 and patient was cleared to have chemical DVT ppx started. Patient was also started on PO Labetalol and amlodipine with labetalol drip titrated off. Pt once again /required additional IM dose of Zyprexa for line pulling and agitation likely in the setting of delirium. Urine cutlture also noted to be positive for E.Coli and Ceftriaxone was started. On AM 10/11 patient lethargic but arousable and following simple commands. Pt also noted to be hyptertensive in the setting of PO med refusal and given a 1x dose of IV hydralazine. An NGT tube was placed for medication administration. Patient had no overnight events. This morning ( 10/12) she pulled out her NGT, is AO x 2 at baseline. Patient was tolerating breakfast and has agreeed to take the required medications per oral. Pt no longer requiring ICU level of care and downgraded to tele. Pt trnasferred to ____ and handoff given to_____.    Follow-up:  [ ] Pending MRI was downgraded  [ ] Nutrition consult   [ ] PT/OT reccomending inpatient Rehab  [ ] 2 month Cardiothoarcic Surgery outpatient eval 78 year old female with no known PMH presents after being found on the floor by her  and noted to have right sided weakness. On arrival to ED, stroke team was activated with patient scoring 13 on NIHHS. Patient was not a TNK candidate given her unknown last known well. Initial CT head was negative for any acute infarcts but did show a possible ICA aneurysm and CT A/P showed a large thoracic aneurysm. Pt was admitted to ICU for BP monitoring given SBP in the 200's where she was started on a labetalol drip. On exam patient with persistent RUE and RLE paralysis, lethargy, intermittent confusion and agitation. On 10/9 an official speech and swallow was done where patient was found to have dysphagia and started on a pureed diet with mildly thickened liquids. Overnight patient was given IM Zyprexa for worsening agitation. On 10/10, a cardiothoracic surgery and neurology consult was placed at [facility] for evaluation of descending aortic aneurysm with mural thrombus and an internal carotid aneurysm. Pt was not accepted given that there was no acute surgical intervention being offered. Per CT surgery, patient would require a 2 month follow-up at an outpatient clinic. Patient had been started on a heparin gtt but per their recs, it was discontinued. Per Dr. Morgan  Neuro, BP goal 120/80 and patient was cleared to have chemical DVT ppx started. Patient was also started on PO Labetalol and amlodipine with labetalol drip titrated off. Pt once again /required additional IM dose of Zyprexa for line pulling and agitation likely in the setting of delirium. Urine culture also noted to be positive for E.Coli and Ceftriaxone was started. On AM 10/11 patient lethargic but arousable and following simple commands. Pt also noted to be hypertensive in the setting of PO med refusal and given a 1x dose of IV hydralazine. An NGT tube was placed for medication administration. Patient had no overnight events. This morning ( 10/12) she pulled out her NGT, is AO x 2 at baseline. Patient was tolerating breakfast and has agreed to take the required medications per oral. Pt no longer requiring ICU level of care and downgraded to tele. Pt transferred to [address] [place] [address] and handoff given to Dr. Dobbs, and PGY1 Dr. Amber Medeiros.     Follow-up:  [ ] Pending MRI was downgraded  [ ] Nutrition consult   [ ] PT/OT recommending inpatient Rehab  [ ] 2 month Cardiothoracic Surgery outpatient eval

## 2023-10-12 NOTE — PROGRESS NOTE ADULT - SUBJECTIVE AND OBJECTIVE BOX
INTERVAL HPI/OVERNIGHT EVENTS:       PRESSORS: [ ] YES [ ] NO  WHICH:    ANTIBIOTICS:                  DATE STARTED:  ANTIBIOTICS:                  DATE STARTED:    Antimicrobial:  cefTRIAXone   IVPB 1000 milliGRAM(s) IV Intermittent every 24 hours    Cardiovascular:  amLODIPine   Tablet 10 milliGRAM(s) Oral daily  labetalol 100 milliGRAM(s) Oral every 12 hours    Pulmonary:    Hematalogic:  aspirin  chewable 81 milliGRAM(s) Oral daily  enoxaparin Injectable 40 milliGRAM(s) SubCutaneous every 24 hours    Other:  atorvastatin 80 milliGRAM(s) Oral at bedtime  chlorhexidine 2% Cloths 1 Application(s) Topical <User Schedule>  influenza  Vaccine (HIGH DOSE) 0.7 milliLiter(s) IntraMuscular once  OLANZapine 5 milliGRAM(s) Oral at bedtime    amLODIPine   Tablet 10 milliGRAM(s) Oral daily  aspirin  chewable 81 milliGRAM(s) Oral daily  atorvastatin 80 milliGRAM(s) Oral at bedtime  cefTRIAXone   IVPB 1000 milliGRAM(s) IV Intermittent every 24 hours  chlorhexidine 2% Cloths 1 Application(s) Topical <User Schedule>  enoxaparin Injectable 40 milliGRAM(s) SubCutaneous every 24 hours  influenza  Vaccine (HIGH DOSE) 0.7 milliLiter(s) IntraMuscular once  labetalol 100 milliGRAM(s) Oral every 12 hours  OLANZapine 5 milliGRAM(s) Oral at bedtime    Drug Dosing Weight  Height (cm): 149.8 (11 Oct 2023 12:20)  Weight (kg): 52 (09 Oct 2023 07:00)  BMI (kg/m2): 23.2 (11 Oct 2023 12:20)  BSA (m2): 1.46 (11 Oct 2023 12:20)    PHYSICAL EXAM:  GENERAL: NAD  EYES: EOMI, PERRLA  NECK: Supple, No JVD; Normal thyroid; Trachea midline: No LAD   NERVOUS SYSTEM:  Alert & Oriented X3,  Motor Strength 5/5 B/L upper and lower extremities; DTRs 2+ intact and symmetric  CHEST/LUNG: No rales, rhonchi, wheezing, breath sounds present bilaterally  HEART: Regular rate and rhythm; No murmurs, no gallops  ABDOMEN: Soft, Nontender, Nondistended; Bowel sounds present, no pain or masses on palpation  : voiding well, Meneses in place  EXTREMITIES:  2+ Peripheral Pulses, No clubbing, cyanosis, or edema  SKIN: warm, intact, no lesions     LINES/DRAINS/DEVICES  CENTRAL LINE: [ ] YES [ ] NO  LOCATION:     MENESES: [ ] YES [ ] NO     A-LINE:  [ ] YES [ ] NO  LOCATION:       ICU Vital Signs Last 24 Hrs  T(C): 36.8 (12 Oct 2023 08:00), Max: 37 (11 Oct 2023 23:00)  T(F): 98.3 (12 Oct 2023 08:00), Max: 98.6 (11 Oct 2023 23:00)  HR: 73 (12 Oct 2023 12:00) (61 - 78)  BP: 135/82 (12 Oct 2023 12:00) (83/61 - 162/92)  BP(mean): 97 (12 Oct 2023 12:00) (71 - 126)  ABP: --  ABP(mean): --  RR: 17 (12 Oct 2023 12:00) (10 - 23)  SpO2: 100% (12 Oct 2023 12:00) (98% - 100%)    O2 Parameters below as of 12 Oct 2023 12:00  Patient On (Oxygen Delivery Method): room air                  10-11 @ 07:01  -  10-12 @ 07:00  --------------------------------------------------------  IN: 150 mL / OUT: 475 mL / NET: -325 mL              LABS:  CBC Full  -  ( 12 Oct 2023 03:49 )  WBC Count : 8.51 K/uL  RBC Count : 4.45 M/uL  Hemoglobin : 12.6 g/dL  Hematocrit : 40.0 %  Platelet Count - Automated : 201 K/uL  Mean Cell Volume : 89.9 fl  Mean Cell Hemoglobin : 28.3 pg  Mean Cell Hemoglobin Concentration : 31.5 gm/dL  Auto Neutrophil # : 5.04 K/uL  Auto Lymphocyte # : 2.58 K/uL  Auto Monocyte # : 0.73 K/uL  Auto Eosinophil # : 0.10 K/uL  Auto Basophil # : 0.03 K/uL  Auto Neutrophil % : 59.1 %  Auto Lymphocyte % : 30.3 %  Auto Monocyte % : 8.6 %  Auto Eosinophil % : 1.2 %  Auto Basophil % : 0.4 %    10-12    141  |  106  |  25<H>  ----------------------------<  95  4.0   |  29  |  1.13    Ca    9.0      12 Oct 2023 03:49  Phos  4.0     10-12  Mg     2.5     10-12    TPro  7.1  /  Alb  3.2<L>  /  TBili  0.4  /  DBili  x   /  AST  12  /  ALT  12  /  AlkPhos  104  10-12      Urinalysis Basic - ( 12 Oct 2023 03:49 )    Color: x / Appearance: x / SG: x / pH: x  Gluc: 95 mg/dL / Ketone: x  / Bili: x / Urobili: x   Blood: x / Protein: x / Nitrite: x   Leuk Esterase: x / RBC: x / WBC x   Sq Epi: x / Non Sq Epi: x / Bacteria: x          RADIOLOGY & ADDITIONAL STUDIES REVIEWED DURING TEAM ROUNDS    [ ]GOALS OF CARE DISCUSSION WITH PATIENT/FAMILY/PROXY:    CRITICAL CARE TIME SPENT: 35 minutes   INTERVAL HPI/OVERNIGHT EVENTS:   No acute overnight events.  AO x3  Patient pulled out NGT ( place 10/11 for crushed  labetalol tablet administration)  Patient is tolerating food well.  Patient agreed to take her po medications which she was previously refusing to take.    PRESSORS: [ ] YES [ ] NO  WHICH:    ANTIBIOTICS:                  DATE STARTED:  ANTIBIOTICS:                  DATE STARTED:    Antimicrobial:  cefTRIAXone   IVPB 1000 milliGRAM(s) IV Intermittent every 24 hours    Cardiovascular:  amLODIPine   Tablet 10 milliGRAM(s) Oral daily  labetalol 100 milliGRAM(s) Oral every 12 hours    Pulmonary:    Hematalogic:  aspirin  chewable 81 milliGRAM(s) Oral daily  enoxaparin Injectable 40 milliGRAM(s) SubCutaneous every 24 hours    Other:  atorvastatin 80 milliGRAM(s) Oral at bedtime  chlorhexidine 2% Cloths 1 Application(s) Topical <User Schedule>  influenza  Vaccine (HIGH DOSE) 0.7 milliLiter(s) IntraMuscular once  OLANZapine 5 milliGRAM(s) Oral at bedtime    amLODIPine   Tablet 10 milliGRAM(s) Oral daily  aspirin  chewable 81 milliGRAM(s) Oral daily  atorvastatin 80 milliGRAM(s) Oral at bedtime  cefTRIAXone   IVPB 1000 milliGRAM(s) IV Intermittent every 24 hours  chlorhexidine 2% Cloths 1 Application(s) Topical <User Schedule>  enoxaparin Injectable 40 milliGRAM(s) SubCutaneous every 24 hours  influenza  Vaccine (HIGH DOSE) 0.7 milliLiter(s) IntraMuscular once  labetalol 100 milliGRAM(s) Oral every 12 hours  OLANZapine 5 milliGRAM(s) Oral at bedtime    Drug Dosing Weight  Height (cm): 149.8 (11 Oct 2023 12:20)  Weight (kg): 52 (09 Oct 2023 07:00)  BMI (kg/m2): 23.2 (11 Oct 2023 12:20)  BSA (m2): 1.46 (11 Oct 2023 12:20)    PHYSICAL EXAM:  GENERAL: NAD  EYES: EOMI, PERRLA  NECK: Supple, No JVD; Normal thyroid; Trachea midline: No LAD   NERVOUS SYSTEM: A&O x2 (person, place). R facial droop, RUE 1/5, RLE 2/5, LUE 4/5, LLE 4/5. Sensation to all four  CHEST/LUNG: No rales, rhonchi, wheezing, breath sounds present bilaterally  HEART: Regular rate and rhythm; No murmurs, no gallops  ABDOMEN: Soft, Nontender, Nondistended; Bowel sounds present, no pain or masses on palpation  : voiding well, Meneses in place  EXTREMITIES:  2+ Peripheral Pulses, No clubbing, cyanosis, or edema  SKIN: warm, intact, no lesions     LINES/DRAINS/DEVICES  CENTRAL LINE: [ ] YES [ ] NO  LOCATION:     MENESES: [ ] YES [ ] NO     A-LINE:  [ ] YES [ ] NO  LOCATION:       ICU Vital Signs Last 24 Hrs  T(C): 36.8 (12 Oct 2023 08:00), Max: 37 (11 Oct 2023 23:00)  T(F): 98.3 (12 Oct 2023 08:00), Max: 98.6 (11 Oct 2023 23:00)  HR: 73 (12 Oct 2023 12:00) (61 - 78)  BP: 135/82 (12 Oct 2023 12:00) (83/61 - 162/92)  BP(mean): 97 (12 Oct 2023 12:00) (71 - 126)  ABP: --  ABP(mean): --  RR: 17 (12 Oct 2023 12:00) (10 - 23)  SpO2: 100% (12 Oct 2023 12:00) (98% - 100%)    O2 Parameters below as of 12 Oct 2023 12:00  Patient On (Oxygen Delivery Method): room air                  10-11 @ 07:01  -  10-12 @ 07:00  --------------------------------------------------------  IN: 150 mL / OUT: 475 mL / NET: -325 mL              LABS:  CBC Full  -  ( 12 Oct 2023 03:49 )  WBC Count : 8.51 K/uL  RBC Count : 4.45 M/uL  Hemoglobin : 12.6 g/dL  Hematocrit : 40.0 %  Platelet Count - Automated : 201 K/uL  Mean Cell Volume : 89.9 fl  Mean Cell Hemoglobin : 28.3 pg  Mean Cell Hemoglobin Concentration : 31.5 gm/dL  Auto Neutrophil # : 5.04 K/uL  Auto Lymphocyte # : 2.58 K/uL  Auto Monocyte # : 0.73 K/uL  Auto Eosinophil # : 0.10 K/uL  Auto Basophil # : 0.03 K/uL  Auto Neutrophil % : 59.1 %  Auto Lymphocyte % : 30.3 %  Auto Monocyte % : 8.6 %  Auto Eosinophil % : 1.2 %  Auto Basophil % : 0.4 %    10-12    141  |  106  |  25<H>  ----------------------------<  95  4.0   |  29  |  1.13    Ca    9.0      12 Oct 2023 03:49  Phos  4.0     10-12  Mg     2.5     10-12    TPro  7.1  /  Alb  3.2<L>  /  TBili  0.4  /  DBili  x   /  AST  12  /  ALT  12  /  AlkPhos  104  10-12      Urinalysis Basic - ( 12 Oct 2023 03:49 )    Color: x / Appearance: x / SG: x / pH: x  Gluc: 95 mg/dL / Ketone: x  / Bili: x / Urobili: x   Blood: x / Protein: x / Nitrite: x   Leuk Esterase: x / RBC: x / WBC x   Sq Epi: x / Non Sq Epi: x / Bacteria: x          RADIOLOGY & ADDITIONAL STUDIES REVIEWED DURING TEAM ROUNDS    [ ]GOALS OF CARE DISCUSSION WITH PATIENT/FAMILY/PROXY:    CRITICAL CARE TIME SPENT: 35 minutes   INTERVAL HPI/OVERNIGHT EVENTS:   No acute overnight events.  AO x2-3  Patient pulled out NGT ( place 10/11 for crushed  labetalol tablet administration)  Patient is tolerating food well.  Patient agreed to take her po medications which she was previously refusing to take.    PRESSORS: [ ] YES [ ] NO  WHICH:    ANTIBIOTICS:                  DATE STARTED:  ANTIBIOTICS:                  DATE STARTED:    Antimicrobial:  cefTRIAXone   IVPB 1000 milliGRAM(s) IV Intermittent every 24 hours    Cardiovascular:  amLODIPine   Tablet 10 milliGRAM(s) Oral daily  labetalol 100 milliGRAM(s) Oral every 12 hours    Pulmonary:    Hematalogic:  aspirin  chewable 81 milliGRAM(s) Oral daily  enoxaparin Injectable 40 milliGRAM(s) SubCutaneous every 24 hours    Other:  atorvastatin 80 milliGRAM(s) Oral at bedtime  chlorhexidine 2% Cloths 1 Application(s) Topical <User Schedule>  influenza  Vaccine (HIGH DOSE) 0.7 milliLiter(s) IntraMuscular once  OLANZapine 5 milliGRAM(s) Oral at bedtime    amLODIPine   Tablet 10 milliGRAM(s) Oral daily  aspirin  chewable 81 milliGRAM(s) Oral daily  atorvastatin 80 milliGRAM(s) Oral at bedtime  cefTRIAXone   IVPB 1000 milliGRAM(s) IV Intermittent every 24 hours  chlorhexidine 2% Cloths 1 Application(s) Topical <User Schedule>  enoxaparin Injectable 40 milliGRAM(s) SubCutaneous every 24 hours  influenza  Vaccine (HIGH DOSE) 0.7 milliLiter(s) IntraMuscular once  labetalol 100 milliGRAM(s) Oral every 12 hours  OLANZapine 5 milliGRAM(s) Oral at bedtime    Drug Dosing Weight  Height (cm): 149.8 (11 Oct 2023 12:20)  Weight (kg): 52 (09 Oct 2023 07:00)  BMI (kg/m2): 23.2 (11 Oct 2023 12:20)  BSA (m2): 1.46 (11 Oct 2023 12:20)    PHYSICAL EXAM:  GENERAL: NAD  EYES: EOMI, PERRLA  NECK: Supple, No JVD; Normal thyroid; Trachea midline: No LAD   NERVOUS SYSTEM: A&O x2 (person, place). R facial droop, RUE 1/5, RLE 2/5, LUE 4/5, LLE 4/5. Sensation to all four  CHEST/LUNG: No rales, rhonchi, wheezing, breath sounds present bilaterally  HEART: Regular rate and rhythm; No murmurs, no gallops  ABDOMEN: Soft, Nontender, Nondistended; Bowel sounds present, no pain or masses on palpation  : voiding well  EXTREMITIES:  2+ Peripheral Pulses, No clubbing, cyanosis, or edema  SKIN: warm, intact, no lesions     LINES/DRAINS/DEVICES  CENTRAL LINE: [ ] YES [ ] NO  LOCATION:     MENESES: [ ] YES [ ] NO     A-LINE:  [ ] YES [ ] NO  LOCATION:       ICU Vital Signs Last 24 Hrs  T(C): 36.8 (12 Oct 2023 08:00), Max: 37 (11 Oct 2023 23:00)  T(F): 98.3 (12 Oct 2023 08:00), Max: 98.6 (11 Oct 2023 23:00)  HR: 73 (12 Oct 2023 12:00) (61 - 78)  BP: 135/82 (12 Oct 2023 12:00) (83/61 - 162/92)  BP(mean): 97 (12 Oct 2023 12:00) (71 - 126)  ABP: --  ABP(mean): --  RR: 17 (12 Oct 2023 12:00) (10 - 23)  SpO2: 100% (12 Oct 2023 12:00) (98% - 100%)    O2 Parameters below as of 12 Oct 2023 12:00  Patient On (Oxygen Delivery Method): room air                  10-11 @ 07:01  -  10-12 @ 07:00  --------------------------------------------------------  IN: 150 mL / OUT: 475 mL / NET: -325 mL              LABS:  CBC Full  -  ( 12 Oct 2023 03:49 )  WBC Count : 8.51 K/uL  RBC Count : 4.45 M/uL  Hemoglobin : 12.6 g/dL  Hematocrit : 40.0 %  Platelet Count - Automated : 201 K/uL  Mean Cell Volume : 89.9 fl  Mean Cell Hemoglobin : 28.3 pg  Mean Cell Hemoglobin Concentration : 31.5 gm/dL  Auto Neutrophil # : 5.04 K/uL  Auto Lymphocyte # : 2.58 K/uL  Auto Monocyte # : 0.73 K/uL  Auto Eosinophil # : 0.10 K/uL  Auto Basophil # : 0.03 K/uL  Auto Neutrophil % : 59.1 %  Auto Lymphocyte % : 30.3 %  Auto Monocyte % : 8.6 %  Auto Eosinophil % : 1.2 %  Auto Basophil % : 0.4 %    10-12    141  |  106  |  25<H>  ----------------------------<  95  4.0   |  29  |  1.13    Ca    9.0      12 Oct 2023 03:49  Phos  4.0     10-12  Mg     2.5     10-12    TPro  7.1  /  Alb  3.2<L>  /  TBili  0.4  /  DBili  x   /  AST  12  /  ALT  12  /  AlkPhos  104  10-12      Urinalysis Basic - ( 12 Oct 2023 03:49 )    Color: x / Appearance: x / SG: x / pH: x  Gluc: 95 mg/dL / Ketone: x  / Bili: x / Urobili: x   Blood: x / Protein: x / Nitrite: x   Leuk Esterase: x / RBC: x / WBC x   Sq Epi: x / Non Sq Epi: x / Bacteria: x          RADIOLOGY & ADDITIONAL STUDIES REVIEWED DURING TEAM ROUNDS    [ ]GOALS OF CARE DISCUSSION WITH PATIENT/FAMILY/PROXY:    CRITICAL CARE TIME SPENT: 35 minutes

## 2023-10-12 NOTE — PROGRESS NOTE ADULT - ATTENDING COMMENTS
78 year old female with no known PMH presents after a fall and was found to have right sided weakness with NIHHS score of 13. CTH was negative for any acute infarcts but did show a possible ICA aneurysm and   CT A/P showed a large thoracic aneurysm. She was ruled out for thrombolytic therapy as last known time was unknown. Patient was found to have HTN urgency and due to the large size of the thoracic aneurysm,  will admit to ICU for closer BP monitoring with labetalol gtt.     Assessment:  1. Acute ischemic stroke  2. Hypertensive emergency   3. 6cm descending thoracic aneurysm   4. Aortic mural thrombus   5. ICA Aneurysm    Plan:  - Reported with episodes of hyperactive delirium overnight   - Start low dose zyprexa at bedtime   - remains with right hemiparesis  - CT surgery recs noted, no surgical intervention for thoracic aneurysm as no evidence of dissection  - No indication for anticoagulation at this time   - Cont. statin  - Cont. on aspirin   - Neurology recs noted  - oral diet   - BP control with oral agents   - PT evaluation

## 2023-10-12 NOTE — CHART NOTE - NSCHARTNOTEFT_GEN_A_CORE
Patient was found to be combative, kicking/ hitting the RN and trying to pull out the IV access   Zyprexa 2.5 mg IM given  Enhanced provider to RN initiated

## 2023-10-12 NOTE — CHART NOTE - NSCHARTNOTESELECT_GEN_ALL_CORE
Event Note
Event Note
[facility] Cardiothoracic Consult/Event Note
ICU DOWNGRADE/Transfer Note

## 2023-10-13 DIAGNOSIS — Z29.9 ENCOUNTER FOR PROPHYLACTIC MEASURES, UNSPECIFIED: ICD-10-CM

## 2023-10-13 DIAGNOSIS — N39.0 URINARY TRACT INFECTION, SITE NOT SPECIFIED: ICD-10-CM

## 2023-10-13 DIAGNOSIS — I16.1 HYPERTENSIVE EMERGENCY: ICD-10-CM

## 2023-10-13 DIAGNOSIS — G45.9 TRANSIENT CEREBRAL ISCHEMIC ATTACK, UNSPECIFIED: ICD-10-CM

## 2023-10-13 DIAGNOSIS — I71.20 THORACIC AORTIC ANEURYSM, WITHOUT RUPTURE, UNSPECIFIED: ICD-10-CM

## 2023-10-13 DIAGNOSIS — E78.5 HYPERLIPIDEMIA, UNSPECIFIED: ICD-10-CM

## 2023-10-13 DIAGNOSIS — K11.8 OTHER DISEASES OF SALIVARY GLANDS: ICD-10-CM

## 2023-10-13 DIAGNOSIS — I67.1 CEREBRAL ANEURYSM, NONRUPTURED: ICD-10-CM

## 2023-10-13 DIAGNOSIS — I63.9 CEREBRAL INFARCTION, UNSPECIFIED: ICD-10-CM

## 2023-10-13 DIAGNOSIS — R73.03 PREDIABETES: ICD-10-CM

## 2023-10-13 DIAGNOSIS — F05 DELIRIUM DUE TO KNOWN PHYSIOLOGICAL CONDITION: ICD-10-CM

## 2023-10-13 PROCEDURE — 70551 MRI BRAIN STEM W/O DYE: CPT | Mod: 26

## 2023-10-13 PROCEDURE — 99233 SBSQ HOSP IP/OBS HIGH 50: CPT

## 2023-10-13 PROCEDURE — 99233 SBSQ HOSP IP/OBS HIGH 50: CPT | Mod: GC

## 2023-10-13 PROCEDURE — 90792 PSYCH DIAG EVAL W/MED SRVCS: CPT

## 2023-10-13 RX ORDER — OLANZAPINE 15 MG/1
2.5 TABLET, FILM COATED ORAL ONCE
Refills: 0 | Status: COMPLETED | OUTPATIENT
Start: 2023-10-13 | End: 2023-10-13

## 2023-10-13 RX ORDER — QUETIAPINE FUMARATE 200 MG/1
7.5 TABLET, FILM COATED ORAL DAILY
Refills: 0 | Status: DISCONTINUED | OUTPATIENT
Start: 2023-10-13 | End: 2023-10-13

## 2023-10-13 RX ORDER — QUETIAPINE FUMARATE 200 MG/1
12.5 TABLET, FILM COATED ORAL DAILY
Refills: 0 | Status: DISCONTINUED | OUTPATIENT
Start: 2023-10-13 | End: 2023-10-15

## 2023-10-13 RX ORDER — AMLODIPINE BESYLATE 2.5 MG/1
10 TABLET ORAL DAILY
Refills: 0 | Status: DISCONTINUED | OUTPATIENT
Start: 2023-10-13 | End: 2023-10-18

## 2023-10-13 RX ORDER — OLANZAPINE 15 MG/1
5 TABLET, FILM COATED ORAL AT BEDTIME
Refills: 0 | Status: DISCONTINUED | OUTPATIENT
Start: 2023-10-13 | End: 2023-10-13

## 2023-10-13 RX ORDER — LABETALOL HCL 100 MG
100 TABLET ORAL EVERY 8 HOURS
Refills: 0 | Status: DISCONTINUED | OUTPATIENT
Start: 2023-10-13 | End: 2023-10-17

## 2023-10-13 RX ADMIN — Medication 100 MILLIGRAM(S): at 23:50

## 2023-10-13 RX ADMIN — Medication 100 MILLIGRAM(S): at 14:28

## 2023-10-13 RX ADMIN — Medication 81 MILLIGRAM(S): at 12:52

## 2023-10-13 RX ADMIN — Medication 100 MILLIGRAM(S): at 06:34

## 2023-10-13 RX ADMIN — OLANZAPINE 2.5 MILLIGRAM(S): 15 TABLET, FILM COATED ORAL at 03:01

## 2023-10-13 RX ADMIN — AMLODIPINE BESYLATE 10 MILLIGRAM(S): 2.5 TABLET ORAL at 06:35

## 2023-10-13 RX ADMIN — CEFTRIAXONE 100 MILLIGRAM(S): 500 INJECTION, POWDER, FOR SOLUTION INTRAMUSCULAR; INTRAVENOUS at 01:45

## 2023-10-13 RX ADMIN — ENOXAPARIN SODIUM 40 MILLIGRAM(S): 100 INJECTION SUBCUTANEOUS at 18:08

## 2023-10-13 RX ADMIN — CHLORHEXIDINE GLUCONATE 1 APPLICATION(S): 213 SOLUTION TOPICAL at 06:34

## 2023-10-13 RX ADMIN — ATORVASTATIN CALCIUM 80 MILLIGRAM(S): 80 TABLET, FILM COATED ORAL at 23:50

## 2023-10-13 NOTE — PROGRESS NOTE ADULT - PROBLEM SELECTOR PROBLEM 7
Pre-diabetes HLD (hyperlipidemia) Acute hyperactive delirium due to multiple etiologies Mass of parotid gland

## 2023-10-13 NOTE — PROGRESS NOTE ADULT - PROBLEM SELECTOR PLAN 5
Pt x 6mm Descending Aortic Aneurysm   - No surgical intervention indicated at this time  - BP control as above, goal /80 Pt x 6mm Descending Aortic Aneurysm   - 1 cm mural thrombi around the aneurysm   - No surgical intervention indicated at this time, anticoagulation as above  - BP control as above, goal /80 ICA Aneurysm  - CTH shows 4 mm outpouching projecting posteriorly inferiorly off the right supraclinoid internal carotid artery at its terminus, concerning for aneurysm  - No surgical intervention at this time  - Neurology, Dr. Morgan following

## 2023-10-13 NOTE — BH CONSULTATION LIAISON ASSESSMENT NOTE - NSBHCHARTREVIEWLAB_PSY_A_CORE FT
CBC Full  -  ( 12 Oct 2023 03:49 )  WBC Count : 8.51 K/uL  RBC Count : 4.45 M/uL  Hemoglobin : 12.6 g/dL  Hematocrit : 40.0 %  Platelet Count - Automated : 201 K/uL  Mean Cell Volume : 89.9 fl  Mean Cell Hemoglobin : 28.3 pg  Mean Cell Hemoglobin Concentration : 31.5 gm/dL  Auto Neutrophil # : 5.04 K/uL  Auto Lymphocyte # : 2.58 K/uL  Auto Monocyte # : 0.73 K/uL  Auto Eosinophil # : 0.10 K/uL  Auto Basophil # : 0.03 K/uL  Auto Neutrophil % : 59.1 %  Auto Lymphocyte % : 30.3 %  Auto Monocyte % : 8.6 %  Auto Eosinophil % : 1.2 %  Auto Basophil % : 0.4 %  10-12    141  |  106  |  25<H>  ----------------------------<  95  4.0   |  29  |  1.13    Ca    9.0      12 Oct 2023 03:49  Phos  4.0     10-12  Mg     2.5     10-12    TPro  7.1  /  Alb  3.2<L>  /  TBili  0.4  /  DBili  x   /  AST  12  /  ALT  12  /  AlkPhos  104  10-12

## 2023-10-13 NOTE — BH CONSULTATION LIAISON ASSESSMENT NOTE - NSBHCHARTREVIEWVS_PSY_A_CORE FT
Vital Signs Last 24 Hrs  T(C): 36.6 (13 Oct 2023 13:39), Max: 36.8 (12 Oct 2023 18:00)  T(F): 97.9 (13 Oct 2023 13:39), Max: 98.3 (12 Oct 2023 18:00)  HR: 71 (13 Oct 2023 13:39) (68 - 84)  BP: 124/83 (13 Oct 2023 13:39) (118/88 - 165/101)  BP(mean): 96 (12 Oct 2023 19:00) (96 - 96)  RR: 18 (13 Oct 2023 13:39) (14 - 19)  SpO2: 100% (13 Oct 2023 13:39) (98% - 100%)    Parameters below as of 13 Oct 2023 13:39  Patient On (Oxygen Delivery Method): room air

## 2023-10-13 NOTE — BH CONSULTATION LIAISON ASSESSMENT NOTE - CURRENT MEDICATION
MEDICATIONS  (STANDING):  amLODIPine   Tablet 10 milliGRAM(s) Oral daily  aspirin  chewable 81 milliGRAM(s) Oral daily  atorvastatin 80 milliGRAM(s) Oral at bedtime  cefTRIAXone   IVPB 1000 milliGRAM(s) IV Intermittent every 24 hours  chlorhexidine 2% Cloths 1 Application(s) Topical <User Schedule>  enoxaparin Injectable 40 milliGRAM(s) SubCutaneous every 24 hours  influenza  Vaccine (HIGH DOSE) 0.7 milliLiter(s) IntraMuscular once  labetalol 100 milliGRAM(s) Oral every 8 hours  OLANZapine 5 milliGRAM(s) Oral at bedtime    MEDICATIONS  (PRN):

## 2023-10-13 NOTE — PROGRESS NOTE ADULT - PROBLEM SELECTOR PLAN 2
ICA Aneurysm  - CTH shows 4 mm outpouching projecting posteriorly inferiorly off the right  supraclinoid internal carotid artery at its terminus, concerning for aneurysm  -neuro checks per protocol  - Neurology, Dr. Briseno consulted and following. Pt p/w right sided deficits unknown last known well (NIHHS 13 on admission)   - No   - CTH 10/8 with no acute ischemia or hemorrhage  - Pt started on Atorvastatin 80mg qd + ASA 81mg    - Official SLP done and patient with concern for oropharyngeal dysphagia started on puree diet with mildly thick liquids.   - ECHO bubble study neg.  - Per Neuro, MRI to done now that pt is downgraded  - Neurology, Dr. Morgan following Pt p/w right sided deficits unknown last known well (NIHHS 13 on admission)   - CTH 10/8 with no acute ischemia or hemorrhage  - Pt started on Atorvastatin 80mg qd + ASA 81mg    - Official SLP done and patient with concern for oropharyngeal dysphagia started on puree diet with mildly thick liquids.   - ECHO bubble study neg.  - Per Neuro, MRI to be done now that pt is downgraded  - Neurology, Dr. Morgan following Pt p/w right sided deficits unknown last known well (NIHHS 13 on admission)   - CTH 10/8 with no acute ischemia or hemorrhage  - Pt started on Atorvastatin 80mg qd + ASA 81mg  - Official SLP done and patient with concern for oropharyngeal dysphagia started on puree diet with mildly thick liquids, however NPO now in setting of increased lethargy and lack of responsiveness    - ECHO bubble study neg.  - Per Neuro, MRI to be done now that pt is downgraded  - Neurology, Dr. Morgan following Pt p/w right sided deficits unknown last known well (NIHHS 13 on admission)   - CTH 10/8 with no acute ischemia or hemorrhage  - Pt started on Atorvastatin 80mg qd + ASA 81mg  - Official SLP done and patient with concern for oropharyngeal dysphagia started on puree diet with mildly thick liquids, however NPO now in setting of increased lethargy and lack of responsiveness    - ECHO bubble study neg.  - MRI 10/13: Acute-subacute infarcts left corona radiata-gangliocapsular region, right cerebellar hemisphere and right basal ganglia, as above, without evidence of associated recent hemorrhage no mass effect  - Awaiting neuro recs for possible plavix  - Neurology, Dr. Morgan following

## 2023-10-13 NOTE — PROGRESS NOTE ADULT - ATTENDING COMMENTS
Patient was seen and examined with  at bedside. patient was very somnolent. opened eyes with repeated calling and recognizes her . But unable to stay alert for a full minute. Tries to follow simple commands but not consistently due to state of somnolence.  reports she was able to sit on bedside chair yesterday and was able to talk. Overnight patient became agitated and required a dose of zyprexa IM.  reports forgetfulness at home.    ICU Vital Signs Last 24 Hrs  T(C): 36.4 (13 Oct 2023 10:00), Max: 36.8 (12 Oct 2023 18:00)  T(F): 97.5 (13 Oct 2023 10:00), Max: 98.3 (12 Oct 2023 18:00)  HR: 68 (13 Oct 2023 10:00) (68 - 84)  BP: 142/87 (13 Oct 2023 10:00) (118/88 - 165/101)  BP(mean): 96 (12 Oct 2023 19:00) (96 - 96)  ABP: --  ABP(mean): --  RR: 16 (13 Oct 2023 10:00) (14 - 19)  SpO2: 98% (13 Oct 2023 10:00) (98% - 100%)    O2 Parameters below as of 13 Oct 2023 10:00  Patient On (Oxygen Delivery Method): room air      P/E:  NAD  Only arousable with repeated calling by name   CTABL  S1S2 WNL  Abd soft, non tender, BS present   BLLE no edema or calf tenderness     Labs noted     A/P:  Acute delirium possibly multifactorial from UTI, hospital acquired, TIA in the setting of dementia  Hypertensive emergency resolved  Descending thoracic aneurysm 6cm with intra mural thrombi   Possible 4mm ICA aneurysm  UTI  HLD    Plan:  Received Zyprexa last night. Pscy consult called  Avoid anticholinergics, benzodiazepines, limit lines/tubes/tethers/restraints, encourage frequent reorientation/reassurance by staff, encourage good sleep hygiene, adequate lighting  NPO until mental status improves  CT head stat if any acute change in mental status   Aspiration and fall precaution   BP is better controlled now, increased Labetalol to TID  Cont Ceftriaxone   No acute intervention for the aneurysm as per CT surgery   Plan of care was discussed with patient; all questions and concerns were addressed and care was aligned with patient's wishes. Patient was seen and examined with  at bedside. patient was very somnolent. opened eyes with repeated calling and recognizes her . But unable to stay alert for a full minute. Tries to follow simple commands but not consistently due to state of somnolence.  reports she was able to sit on bedside chair yesterday and was able to talk. Overnight patient became agitated and required a dose of zyprexa IM.  reports forgetfulness at home.    ICU Vital Signs Last 24 Hrs  T(C): 36.4 (13 Oct 2023 10:00), Max: 36.8 (12 Oct 2023 18:00)  T(F): 97.5 (13 Oct 2023 10:00), Max: 98.3 (12 Oct 2023 18:00)  HR: 68 (13 Oct 2023 10:00) (68 - 84)  BP: 142/87 (13 Oct 2023 10:00) (118/88 - 165/101)  BP(mean): 96 (12 Oct 2023 19:00) (96 - 96)  ABP: --  ABP(mean): --  RR: 16 (13 Oct 2023 10:00) (14 - 19)  SpO2: 98% (13 Oct 2023 10:00) (98% - 100%)    O2 Parameters below as of 13 Oct 2023 10:00  Patient On (Oxygen Delivery Method): room air      P/E:  NAD  Only arousable with repeated calling by name   CTABL  S1S2 WNL  Abd soft, non tender, BS present   BLLE no edema or calf tenderness     Labs noted     A/P:  Acute delirium possibly multifactorial from UTI, hospital acquired, TIA in the setting of dementia  Hypertensive emergency resolved  Descending thoracic aneurysm 6cm with intra mural thrombi   Possible 4mm ICA aneurysm  UTI  HLD    Plan:  Received Zyprexa last night. Pscy consult called  Avoid anticholinergics, benzodiazepines, limit lines/tubes/tethers/restraints, encourage frequent reorientation/reassurance by staff, encourage good sleep hygiene, adequate lighting  NPO until mental status improves  CT head stat if any acute change in mental status   Aspiration and fall precaution   BP is better controlled now, increased Labetalol to TID  Cont Ceftriaxone   No acute intervention for the aneurysm as per CT surgery   Plan of care was discussed with patient's ; all questions and concerns were addressed and care was aligned with patient's wishes.

## 2023-10-13 NOTE — PROGRESS NOTE ADULT - TIME BILLING
I counseled the primary team about the medications to maintain for the patient for secondary stroke prevention given the presence of multiple acute and chronic infarcts.

## 2023-10-13 NOTE — BH CONSULTATION LIAISON ASSESSMENT NOTE - SUMMARY
77 y/o F with no hx, who is admitted due to a suspected CVA, found to have an ICA aneurysm and HTN emergency requiring ICU admission. She is consulted due to agitation overnight. The patient's presentation appears to be consistent with an acute delirium. Unclear if there an underlying neurocognitive disorder, like a dementia, as well.     -Would d/c Zyprexa  -Start Seroquel 12.5 mg PO around 7-8PM  -PRN: Haldol 1 mg IM q 6 hrs PRN for agitation  -No need for an inpatient psychiatric admission or 1:1; observation status as per primary team  -Monitor her QTc  -Delirium precautions  -Avoid use of benzodiazepines and anticholinergics  -Case discussed with the primary team  -Will follow up as needed

## 2023-10-13 NOTE — PROGRESS NOTE ADULT - SUBJECTIVE AND OBJECTIVE BOX
NEUROLOGY FOLLOW-UP NOTE    NAME:  CINTIA PHIPPS      ASSESSMENT:  78 RHF with short-term memory loss and recent fall without loss of consciousness or seizures, concerning for syncopal event; presenting with transient right-sided weakness, secondary to acute let hemispheric and right cerebellar infarcts, also with multiple chronic infarcts      RECOMMENDATIONS:    - Maintain in Telemetry unit: Q4H VS & Neurochecks    - Maintain Atorvastatin 80mg PO QHS (goal LDL < 70 mg/dL) and Aspirin 81mg PO Daily for secondary stroke prevention for acute and chronic lacunar infarct    - No role for permissive hypertension - Goal /80, Treat BP > 140/90    - Maintain SBP > 100 mmHg    - Monitor orthostatic BP & HR with each vital signs check    - Plentiful fluid hydration (2 liters, or 8 glasses, of water daily) encouraged    - Patient should maintain caution with rapid changes in position (currently bedbound)    - Infection workup and management as per primary team    - PT/OT    - DVT ppx: SCDs, Enoxaparin (therapeutic anticoagulation not needed from a neurological perspective)          NOTE TO BE COMPLETED - PLEASE REFER TO ABOVE ONLY AND IGNORE INFORMATION BELOW    ******************************    HPI:  78 year old female with no known PMH presents after a fall. According to  at bedside, patient was found on the floor and was unable to move her right upper/lower extremities. Denies any LOC, seizure like activity, or urinary incontinence  states that patient has been getting increasingly forgetful for the past few months, and has not seen a physician in over a year. Patient states she does not remember the fall, and currently has no complaints. Denies any headaches dizziness, fever, chills, visual changes, weakness, numbness, tingling chest pain, SOB, abdominal pain, or change in bowel habits.     In the ED:  Afebrile, /148, HR 85, 95% on RA  NIHHS 13  CTH/Angio showed old infarcts w/ 4mm ICA outpouch concerning for aneurysm  s/p labetalol 10mg IV + nicardipine gtt         (09 Oct 2023 03:21)      NEURO HPI:      INTERVAL HISTORY:      MEDICATIONS:  amLODIPine   Tablet 10 milliGRAM(s) Oral daily  aspirin  chewable 81 milliGRAM(s) Oral daily  atorvastatin 80 milliGRAM(s) Oral at bedtime  cefTRIAXone   IVPB 1000 milliGRAM(s) IV Intermittent every 24 hours  chlorhexidine 2% Cloths 1 Application(s) Topical <User Schedule>  enoxaparin Injectable 40 milliGRAM(s) SubCutaneous every 24 hours  influenza  Vaccine (HIGH DOSE) 0.7 milliLiter(s) IntraMuscular once  labetalol 100 milliGRAM(s) Oral every 8 hours  QUEtiapine 12.5 milliGRAM(s) Oral daily      ALLERGIES:  No Known Allergies      REVIEW OF SYSTEMS:  Fourteen systems reviewed and negative except as in HPI / Interval History.          OBJECTIVE:  Vital Signs Last 24 Hrs  T(C): 36.3 (13 Oct 2023 21:08), Max: 36.6 (13 Oct 2023 13:39)  T(F): 97.3 (13 Oct 2023 21:08), Max: 97.9 (13 Oct 2023 13:39)  HR: 61 (13 Oct 2023 21:08) (61 - 80)  BP: 119/74 (13 Oct 2023 21:08) (108/74 - 150/135)  BP(mean): --  RR: 18 (13 Oct 2023 21:08) (16 - 18)  SpO2: 98% (13 Oct 2023 21:08) (97% - 100%)    Parameters below as of 13 Oct 2023 21:08  Patient On (Oxygen Delivery Method): room air        General Examination:  General: No acute distress  HEENT: Atraumatic, Normocephalic  Respiratory: CTA B/l.  No crackles, rhonchi, or wheezes.  Cardiovascular: RRR.  Normal S1 & S2.  Normal b/l radial and pedal pulses.    Neurological Examination:  General / Mental Status: AAO x 3.  No aphasia or dysarthria.  Naming and repetition intact.  Cranial Nerves: VFF x 4.  PERRL.  EOMI x 2, No nystagmus or diplopia.  B/l V1-V3 equal and intact to light touch and pinprick.  Symmetric facial movement and palate elevation.  B/l hearing equal to finger rub.  5/5 strength with b/l sternocleidomastoid and trapezius.  Midline tongue protrusion, with no atrophy or fasciculations.  Motor: Normal bulk & tone in all four extremities.  5/5 strength throughout all four extremities.  No downward drift, rigidity, spasticity, or tremors in any of the four extremities.  Sensory: Intact to light touch and pinprick in all four extremities.  Negative Romberg.  Reflex: 2+ and symmetric at b/l biceps, triceps, brachioradialis, patellae, and ankles.  Downgoing toes b/l.  Coordination: No dysmetria with b/l finger-to-nose and heel raise tests.  Symmetric rapid alternating movements b/l.  Gait: Normal, narrow-based gait.  No difficulty with tiptoe, heel, and tandem gaits.        LABORATORY VALUES:                          12.6   8.51  )-----------( 201      ( 12 Oct 2023 03:49 )             40.0       10-12    141  |  106  |  25<H>  ----------------------------<  95  4.0   |  29  |  1.13    Ca    9.0      12 Oct 2023 03:49  Phos  4.0     10-12  Mg     2.5     10-12    TPro  7.1  /  Alb  3.2<L>  /  TBili  0.4  /  DBili  x   /  AST  12  /  ALT  12  /  AlkPhos  104  10-12    10-09 Chol 216<H> LDL -- HDL 60 Trig 91    Glucose Trend  10-12-23 @ 03:49   -  95 -- --  10-11-23 @ 17:21   -  -- -- 236<H>  10-11-23 @ 16:22   -  -- -- 76  10-11-23 @ 12:11   -  -- -- 147<H>  10-11-23 @ 11:47   -  -- -- 64<L>  10-11-23 @ 11:05   -  -- -- 73  10-11-23 @ 03:56   -  104<H> -- --                    NEUROIMAGING:          Please contact the Neurology consult service with any neurological questions.      Finn Morgan MD   of Neurology  [facility] at [facility]         NEUROLOGY FOLLOW-UP NOTE    NAME:  CINTIA PHIPPS      ASSESSMENT:  78 RHF with short-term memory loss and recent fall without loss of consciousness or seizures, concerning for syncopal event; presenting with transient right-sided weakness, secondary to acute let hemispheric and right cerebellar infarcts, also with multiple chronic infarcts      RECOMMENDATIONS:    - Maintain in Telemetry unit: Q4H VS & Neurochecks    - Maintain Atorvastatin 80mg PO QHS (goal LDL < 70 mg/dL) and Aspirin 81mg PO Daily for secondary stroke prevention for acute and chronic lacunar infarct    - No role for permissive hypertension - Goal /80, Treat BP > 140/90    - Maintain SBP > 100 mmHg    - Monitor orthostatic BP & HR with each vital signs check    - Plentiful fluid hydration (2 liters, or 8 glasses, of water daily) encouraged    - Patient should maintain caution with rapid changes in position (currently bedbound)    - Infection workup and management as per primary team    - PT/OT    - DVT ppx: SCDs, Enoxaparin (therapeutic anticoagulation not needed from a neurological perspective)          ******************************    HPI:  78 year old female with no known PMH presents after a fall. According to  at bedside, patient was found on the floor and was unable to move her right upper/lower extremities. Denies any LOC, seizure like activity, or urinary incontinence.  states that patient has been getting increasingly forgetful for the past few months, and has not seen a physician in over a year. Patient states she does not remember the fall, and currently has no complaints. Denies any headaches dizziness, fever, chills, visual changes, weakness, numbness, tingling chest pain, SOB, abdominal pain, or change in bowel habits.   In the ED:  Afebrile, /148, HR 85, 95% on RA  NIHHS 13  CTH/Angio showed old infarcts w/ 4mm ICA outpouch concerning for aneurysm  s/p labetalol 10mg IV + nicardipine gtt (09 Oct 2023 03:21)      NEURO HPI:  78F with short-term memory loss over the past few months, who was brought to the Emergency Department after being found down on the floor with inability to move her right arm and right leg.      INTERVAL HISTORY:  The patient has not any new witnessed episodes of loss of consciousness overnight.      MEDICATIONS:  amLODIPine   Tablet 10 milliGRAM(s) Oral daily  aspirin  chewable 81 milliGRAM(s) Oral daily  atorvastatin 80 milliGRAM(s) Oral at bedtime  cefTRIAXone   IVPB 1000 milliGRAM(s) IV Intermittent every 24 hours  chlorhexidine 2% Cloths 1 Application(s) Topical <User Schedule>  enoxaparin Injectable 40 milliGRAM(s) SubCutaneous every 24 hours  influenza  Vaccine (HIGH DOSE) 0.7 milliLiter(s) IntraMuscular once  labetalol 100 milliGRAM(s) Oral every 8 hours  QUEtiapine 12.5 milliGRAM(s) Oral daily      ALLERGIES:  No Known Allergies      REVIEW OF SYSTEMS:  Fourteen systems reviewed and negative or unobtainable except as in HPI / Interval History.          OBJECTIVE:  Vital Signs Last 24 Hrs  T(C): 36.3 (13 Oct 2023 21:08), Max: 36.6 (13 Oct 2023 13:39)  T(F): 97.3 (13 Oct 2023 21:08), Max: 97.9 (13 Oct 2023 13:39)  HR: 61 (13 Oct 2023 21:08) (61 - 80)  BP: 119/74 (13 Oct 2023 21:08) (108/74 - 150/135)  RR: 18 (13 Oct 2023 21:08) (16 - 18)  SpO2: 98% (13 Oct 2023 21:08) (97% - 100%)  Parameters below as of 13 Oct 2023 21:08  Patient On (Oxygen Delivery Method): room air      General Examination:  General: No acute distress  HEENT: Atraumatic, Normocephalic  Respiratory: CTA B/l.  No crackles, rhonchi, or wheezes.  Cardiovascular: RRR.  Normal S1 & S2.  Normal b/l radial and pedal pulses.    Neurological Examination:  General / Mental Status: AAO x 1 (only to person).  Minimally verbal, with no apparent aphasia or dysarthria.  Cranial Nerves: B/l blink to threat present.  PERRL.  EOMI x 2, No nystagmus.  B/l V1-V3 equal and intact to light touch and pinprick.  Symmetric facial movement and palate elevation.  B/l hearing diminished to finger rub.  At least 3/5 strength with b/l sternocleidomastoid and trapezius, limited by poor effort.  Midline tongue protrusion.  Motor: Diminished bulk & tone in all four extremities.  At least 3/5 strength throughout all four extremities, all of which drift downward to bed, limited by poor effort.  No rigidity, spasticity, or tremors in any of the four extremities.  Sensory: Intact to light touch and pinprick in all four extremities.  Reflex: 1+ and symmetric at b/l biceps, triceps, brachioradialis, patellae, and ankles.  Mute toes b/l.  Unable to assess coordination, gait, or Romberg testing in patient due to poor cooperation with exam.      NIHSS Score:    LOC - 0  LOC Questions - 1  LOC Commands - 1  Gaze Preference - 0  Visual Fields - 0  Facial Palsy - 0  Motor Arm Left - 2  Motor Arm Right - 2  Motor Leg Left - 2  Motor Leg Right - 2  Limb Ataxia - UN  Sensory - 0  Language - 0  Speech - 0  Extinction - UN    NIHSS Score Total: 10 - No IV TNK because of uncertain last seen normal time    Modified [place] Scale: 4          LABORATORY VALUES:                          12.6   8.51  )-----------( 201      ( 12 Oct 2023 03:49 )             40.0       10-12    141  |  106  |  25<H>  ----------------------------<  95  4.0   |  29  |  1.13    Ca    9.0      12 Oct 2023 03:49  Phos  4.0     10-12  Mg     2.5     10-12    TPro  7.1  /  Alb  3.2<L>  /  TBili  0.4  /  DBili  x   /  AST  12  /  ALT  12  /  AlkPhos  104  10-12    10-09 Chol 216<H> <H> HDL 60 Trig 91    Glucose Trend  10-12-23 @ 03:49   -  95 -- --  10-11-23 @ 17:21   -  -- -- 236<H>  10-11-23 @ 16:22   -  -- -- 76  10-11-23 @ 12:11   -  -- -- 147<H>  10-11-23 @ 11:47   -  -- -- 64<L>  10-11-23 @ 11:05   -  -- -- 73  10-11-23 @ 03:56   -  104<H> -- --    A1C with Estimated Average Glucose (10.09.23 @ 10:30)   A1C with Estimated Average Glucose Result: 5.9%  Estimated Average Glucose: 123 mg/dL          NEUROIMAGING:      CT Head & CTA Head/Neck & CT Perfusion Head (10/9/23):  - No acute intracranial abnormality  - Chronic left frontal and right basal ganglia infarcts  - Chronic microvascular changes  - Mild diffuse atrophy  - No intracranial or neck large vessel cutoff  - Right supraclinoid ICA posteriorly projecting aneurysm  - No focal hypoperfusion      TTE x 2 (10/9/23 & 10/10/23):  - LVEF 55-60%  - Severe concentric LV hypertrophy  - Grade I (mild) diastolic dysfunction  - Moderate tricuspid regurgitation  - No cardiac thrombus or intracardiac shunt      MRI Brain (10/13/23):  - Acute/Subacute infarcts at left corona radiata / basal ganglia, right basal ganglia, and right cerebellar hemisphere  - Chronic left frontal and right basal ganglia infarcts  - Chronic microvascular changes          Please contact the Neurology consult service with any neurological questions.      Finn Morgan MD   of Neurology  [facility] at [facility]

## 2023-10-13 NOTE — PROGRESS NOTE ADULT - ASSESSMENT
78 year old female with no known PMH presents after a fall and was found to have right sided weakness with NIHHS score of 13. Symptoms have now resolved and CTH was negative for any acute infarcts but did show a possible ICA aneurysm and   CT A/P showed a large thoracic aneurysm.  Patient was found to have HTN urgency and due to the large size of the thoracic aneurysm,  will admit to ICU for closer BP monitoring with labetalol gtt.     #HTN Urgency  #TIA  #Thoracic aneurysm (6cm)  #ICA Aneurysm          Cardiovascular  # Hypertensive Emergency ('s)  # 6mm Descending Aortic Aneurysm   #Hyperlipidemia  - EKG NSR , trop x1 negative   - s/p labetalol 10mg IV  - Per Neuro, no role for permissive hypertension - Goal /80, Treat BP > 140/90  - Changed Labetalol 100mg BID with hold parameters  - Increased Amlodipine to 10mg qd via NGT  - Atorvastatin 80mg qd  - Continue ASA 81mg    # Thoracic Aneurysm  -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   - no evidence of aortic dissection  - Goal /80 per Neuro  - CT Surgery consulted and not offering any acute surgical interventions. Dr. Brooks to place official recs. Per our conversation, heparin gtt to be discontinued given possible chronicity of aneurysm and mural thrombi. Requesting 2 month outpatient follow-up with CT surg.     # Mural Thrombi  -1 cm mural thrombi around the aneurysm  - if no surgical intervention for aneurysm, consider starting AC, as patient may have had embolic CVA  - Per Neurology, mural thrombi likely not the nidus for CVA given location.     Pulm  # No issues     ID  #UTI   - Positive UA and UCx with E.Coli  - Started on 1g Ceftriaxone for 5 days till 10/15 ( D 2/5)    Nephro  # Renal Cyst  - asymptomatic   - incidentally found to have a 2.8 cm sized the right renal cyst    GI  #Dysphagia  - Pureed diet and moderately thickened liquids, seen by SLP.   - Patient refusing PO, NGT placed 10/11- patient pulled out NGT this morning     Heme  #Per neuro, ok to start DVT ppx- c/w lovenox sc daily    Endocrine  # Pre-Diabetes (A1C 5.9)  - episode of hypoglycemia 10/11- f.s. 64 and given 1 amp of dextrose.   - s/p D5LR at 75cc/hr ivo of decreased oral intake   - Patient tolerating oral diet today    Skin/Catheters  - Peripheral IV lines     Prophylaxis   - DVT prophylaxis : Lovenox   - SCDs    GOC  FULL CODE , palliative care following  Disposition : DG to tele       78 year old female with no known PMH presents after a fall and was found to have right sided weakness with NIHHS score of 13. CTH showed no acute infarcts, but did show possible ICA aneurysm. CT A/P showed a 6cm descending thoracic aneurysm. Patient was found to have HTN emergency and due to the large size of the thoracic aneurysm, she was admitted to ICU for closer BP monitoring with labetalol gtt.     #HTN Emergency  #TIA  #Thoracic aneurysm (6cm)  #ICA Aneurysm          Cardiovascular  # Hypertensive Emergency ('s)  # 6mm Descending Aortic Aneurysm   #Hyperlipidemia  - EKG NSR , trop x1 negative   - s/p labetalol 10mg IV  - Per Neuro, no role for permissive hypertension - Goal /80, Treat BP > 140/90  - Changed Labetalol 100mg TID  - Continue Amlodipine 10mg qd  - Continue Atorvastatin 80mg qd  - Continue ASA 81mg    # Thoracic Aneurysm  -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   - no evidence of aortic dissection  - Goal /80 per Neuro  - CT Surgery consulted and not offering any acute surgical interventions. Dr. Brooks to place official recs. Requesting 2 month outpatient follow-up with CT surg.     # Mural Thrombi  -1 cm mural thrombi around the aneurysm  - if no surgical intervention for aneurysm, consider starting AC, as patient may have had embolic CVA  - Per Neurology, mural thrombi likely not the nidus for CVA given location.     Pulm  # No issues     ID  #UTI   - Positive UA and UCx with E.Coli  - On 1g Ceftriaxone for 5 days till 10/15 ( D 2/5)    Nephro  # Renal Cyst  - asymptomatic   - incidentally found to have a 2.8 cm sized the right renal cyst    GI  #Dysphagia  - Patient was refusing PO before, NGT placed 10/11- patient pulled out NGT yesterday morning   - NPO for now    Heme  #Per neuro, ok to start DVT ppx  - c/w lovenox sc daily    Endocrine  # Pre-Diabetes (A1C 5.9)  - episode of hypoglycemia 10/11- f.s. 64 and given 1 amp of dextrose.   - s/p D5LR at 75cc/hr ivo of decreased oral intake     Skin/Catheters  - Peripheral IV lines     Prophylaxis   - DVT prophylaxis : Lovenox   - SCDs    GO  FULL CODE , palliative care following  Disposition : DG to tele       78 year old female with no known PMH presents after a fall and was found to have right sided weakness with NIHHS score of 13. CTH showed no acute infarcts, but did show possible ICA aneurysm. CT A/P showed a 6cm descending thoracic aneurysm. Patient was found to have HTN emergency and due to the large size of the thoracic aneurysm, she was admitted to ICU for closer BP monitoring with labetalol gtt, approved for downgrade to telemetry on 10/12 for further medical management.

## 2023-10-13 NOTE — BH CONSULTATION LIAISON ASSESSMENT NOTE - HPI (INCLUDE ILLNESS QUALITY, SEVERITY, DURATION, TIMING, CONTEXT, MODIFYING FACTORS, ASSOCIATED SIGNS AND SYMPTOMS)
79 y/o F with no hx, who is admitted due to a suspected CVA, found to have an ICA aneurysm and HTN emergency requiring ICU admission. She is consulted due to agitation overnight. As per team, patient has been getting agitated at night requiring PRN Zyprexa. Upon evaluation, the patient was lethargic and not able of participating of an interview.  was at the bedside and said that before the current admission, she would sometimes get confused and forgetful. Says that he has also seen her get more confused and agitated during the late evening/night time. Remaining information was gathered from the , but the evaluation was limited.

## 2023-10-13 NOTE — PROGRESS NOTE ADULT - PROBLEM SELECTOR PLAN 4
Hypertensive Emergency ('s)  # 6mm Descending Aortic Aneurysm   #Hyperlipidemia  - EKG NSR , trop x1 negative   - s/p labetalol 10mg IV  - Per Neuro, no role for permissive hypertension - Goal /80, Treat BP > 140/90  - Changed Labetalol 100mg BID with hold parameters  - Increased Amlodipine to 10mg qd via NGT  - Atorvastatin 80mg qd  - Continue ASA 81mg ICA Aneurysm  - CTH shows 4 mm outpouching projecting posteriorly inferiorly off the right  supraclinoid internal carotid artery at its terminus, concerning for aneurysm  - Neurology, Dr. Morgan following ICA Aneurysm  - CTH shows 4 mm outpouching projecting posteriorly inferiorly off the right supraclinoid internal carotid artery at its terminus, concerning for aneurysm  - No surgical intervention at this time  - Neurology, Dr. Morgan following Pt w acute E. Coli UTI  - c/w CTX 1g for 5 days (until 10/15)

## 2023-10-13 NOTE — PROGRESS NOTE ADULT - PROBLEM SELECTOR PLAN 8
- on Lovenox Pre-diabetes  - episode of hypoglycemia 10/11- f.s. 64 and given 1 amp of dextrose.   - s/p D5LR at 75cc/hr ivo of decreased oral intake Pt diagnosed w Pre-diabetes (A1C 5.9)  - No meds indicated at this time  - Monitor sugars Pt w pmh of HLD  - c/w Atorvastatin 80mg qd

## 2023-10-13 NOTE — PROGRESS NOTE ADULT - PROBLEM SELECTOR PLAN 3
Hypertensive Emergency ('s)  # 6mm Descending Aortic Aneurysm   #Hyperlipidemia  - EKG NSR , trop x1 negative   - s/p labetalol 10mg IV  - Per Neuro, no role for permissive hypertension - Goal /80, Treat BP > 140/90  - Changed Labetalol 100mg BID with hold parameters  - Increased Amlodipine to 10mg qd via NGT  - Atorvastatin 80mg qd  - Continue ASA 81mg Pt w acute E. Coli UTI  - c/w CTX 1g for 5 days (until 10/15) Pt p/w right sided deficits unknown last known well (NIHHS 13 on admission)   - CTH 10/8 with no acute ischemia or hemorrhage  - Pt started on Atorvastatin 80mg qd + ASA 81mg  - Official SLP done and patient with concern for oropharyngeal dysphagia started on puree diet with mildly thick liquids, however NPO now in setting of increased lethargy and lack of responsiveness    - ECHO bubble study neg.  - MRI 10/13: Acute-subacute infarcts left corona radiata-gangliocapsular region, right cerebellar hemisphere and right basal ganglia, as above, without evidence of associated recent hemorrhage no mass effect  - Neurology, Dr. Morgan following Pt p/w right sided deficits unknown last known well (NIHHS 13 on admission)   - CTH 10/8 with no acute ischemia or hemorrhage  - Pt started on Atorvastatin 80mg qd + ASA 81mg  - Official SLP done and patient with concern for oropharyngeal dysphagia started on puree diet with mildly thick liquids, however NPO now in setting of increased lethargy and lack of responsiveness    - ECHO bubble study neg.  - MRI 10/13: Acute-subacute infarcts left corona radiata-gangliocapsular region, right cerebellar hemisphere and right basal ganglia, as above, without evidence of associated recent hemorrhage no mass effect  - Awaiting neuro recs for possible plavix  - Neurology, Dr. Morgan following

## 2023-10-13 NOTE — PROGRESS NOTE ADULT - PROBLEM SELECTOR PROBLEM 6
HLD (hyperlipidemia) Acute hyperactive delirium due to multiple etiologies Aneurysm, aorta, thoracic

## 2023-10-13 NOTE — PROGRESS NOTE ADULT - SUBJECTIVE AND OBJECTIVE BOX
PGY-1 Progress Note discussed with attending      PLEASE CONTACT ON CALL TEAM:  - On Call Team (Please refer to [facility]) FROM 5:00 PM - 8:30PM  - Nightfloat Team FROM 8:30 -7:30 AM    INTERVAL HPI/OVERNIGHT EVENTS:       REVIEW OF SYSTEMS:  CONSTITUTIONAL: No fever, weight loss, or fatigue  RESPIRATORY: No cough, wheezing,  or hemoptysis; No shortness of breath  CARDIOVASCULAR: No chest pain, palpitations, dizziness, or leg swelling  GASTROINTESTINAL: No abdominal pain. No nausea, vomiting, or hematemesis; No diarrhea or constipation. No melena or hematochezia.  GENITOURINARY: No dysuria, hematuria, or urinary frequency  NEUROLOGICAL: No headaches, memory loss. No focal weakness, numbness, or tremors  SKIN: No itching, burning, or rashes    MEDICATIONS  (STANDING):  amLODIPine   Tablet 10 milliGRAM(s) Oral daily  aspirin  chewable 81 milliGRAM(s) Oral daily  atorvastatin 80 milliGRAM(s) Oral at bedtime  cefTRIAXone   IVPB 1000 milliGRAM(s) IV Intermittent every 24 hours  chlorhexidine 2% Cloths 1 Application(s) Topical <User Schedule>  enoxaparin Injectable 40 milliGRAM(s) SubCutaneous every 24 hours  influenza  Vaccine (HIGH DOSE) 0.7 milliLiter(s) IntraMuscular once  labetalol 100 milliGRAM(s) Oral every 12 hours  OLANZapine 5 milliGRAM(s) Oral at bedtime    MEDICATIONS  (PRN):      Vital Signs Last 24 Hrs  T(C): 36.4 (13 Oct 2023 04:37), Max: 36.9 (12 Oct 2023 12:00)  T(F): 97.5 (13 Oct 2023 04:37), Max: 98.4 (12 Oct 2023 12:00)  HR: 80 (13 Oct 2023 04:37) (63 - 84)  BP: 148/85 (13 Oct 2023 06:35) (117/77 - 165/101)  BP(mean): 96 (12 Oct 2023 19:00) (88 - 100)  RR: 18 (13 Oct 2023 04:37) (11 - 19)  SpO2: 100% (13 Oct 2023 04:37) (99% - 100%)    Parameters below as of 13 Oct 2023 04:37  Patient On (Oxygen Delivery Method): room air        PHYSICAL EXAMINATION:  GENERAL: NAD, lying comfortably in bed  HEAD:  Atraumatic, Normocephalic  EYES:  Conjunctiva and sclera clear  NECK: Supple. No JVD. Normal thyroid  CHEST/LUNG: Clear to auscultation. No rales or wheezing  HEART: Regular rate and rhythm. No abnormal heart sounds  ABDOMEN: Soft, nontender, and nondistended. Bowel sounds present. No pain or masses on palpation  NERVOUS SYSTEM:  Alert & Oriented X3  EXTREMITIES:  2+ Peripheral Pulses. No appreciable edema  SKIN: warm dry                          12.6   8.51  )-----------( 201      ( 12 Oct 2023 03:49 )             40.0     10-12    141  |  106  |  25<H>  ----------------------------<  95  4.0   |  29  |  1.13    Ca    9.0      12 Oct 2023 03:49  Phos  4.0     10-12  Mg     2.5     10-12    TPro  7.1  /  Alb  3.2<L>  /  TBili  0.4  /  DBili  x   /  AST  12  /  ALT  12  /  AlkPhos  104  10-12    LIVER FUNCTIONS - ( 12 Oct 2023 03:49 )  Alb: 3.2 g/dL / Pro: 7.1 g/dL / ALK PHOS: 104 U/L / ALT: 12 U/L DA / AST: 12 U/L / GGT: x                   I&O's Summary    12 Oct 2023 07:01  -  13 Oct 2023 07:00  --------------------------------------------------------  IN: 160 mL / OUT: 500 mL / NET: -340 mL            CAPILLARY BLOOD GLUCOSE      RADIOLOGY & ADDITIONAL TESTS:                   PGY-1 Progress Note discussed with attending      PLEASE CONTACT ON CALL TEAM:  - On Call Team (Please refer to [facility]) FROM 5:00 PM - 8:30PM  - Nightfloat Team FROM 8:30 -7:30 AM    INTERVAL HPI/OVERNIGHT EVENTS: No acute events overnight. Patient assessed at bedside - patient was very somnolent. She opened her eyes only briefly to arousal, but correctly identified her  who was at bedside.       REVIEW OF SYSTEMS:  CONSTITUTIONAL: No fever, weight loss, or fatigue  RESPIRATORY: No cough, wheezing,  or hemoptysis; No shortness of breath  CARDIOVASCULAR: No chest pain, palpitations, dizziness, or leg swelling  GASTROINTESTINAL: No abdominal pain. No nausea, vomiting, or hematemesis; No diarrhea or constipation. No melena or hematochezia.  GENITOURINARY: No dysuria, hematuria, or urinary frequency  NEUROLOGICAL: + Right sided weakness, No headaches, memory loss. No tremors  SKIN: No itching, burning, or rashes    MEDICATIONS  (STANDING):  amLODIPine   Tablet 10 milliGRAM(s) Oral daily  aspirin  chewable 81 milliGRAM(s) Oral daily  atorvastatin 80 milliGRAM(s) Oral at bedtime  cefTRIAXone   IVPB 1000 milliGRAM(s) IV Intermittent every 24 hours  chlorhexidine 2% Cloths 1 Application(s) Topical <User Schedule>  enoxaparin Injectable 40 milliGRAM(s) SubCutaneous every 24 hours  influenza  Vaccine (HIGH DOSE) 0.7 milliLiter(s) IntraMuscular once  labetalol 100 milliGRAM(s) Oral every 12 hours  OLANZapine 5 milliGRAM(s) Oral at bedtime    MEDICATIONS  (PRN):      Vital Signs Last 24 Hrs  T(C): 36.4 (13 Oct 2023 04:37), Max: 36.9 (12 Oct 2023 12:00)  T(F): 97.5 (13 Oct 2023 04:37), Max: 98.4 (12 Oct 2023 12:00)  HR: 80 (13 Oct 2023 04:37) (63 - 84)  BP: 148/85 (13 Oct 2023 06:35) (117/77 - 165/101)  BP(mean): 96 (12 Oct 2023 19:00) (88 - 100)  RR: 18 (13 Oct 2023 04:37) (11 - 19)  SpO2: 100% (13 Oct 2023 04:37) (99% - 100%)    Parameters below as of 13 Oct 2023 04:37  Patient On (Oxygen Delivery Method): room air        PHYSICAL EXAMINATION:  GENERAL: NAD  HEAD:  Atraumatic, Normocephalic  EYES:  Conjunctiva and sclera clear  NECK: Supple. No JVD. Normal thyroid  CHEST/LUNG: Clear to auscultation. No rales or wheezing  HEART: Regular rate and rhythm. No abnormal heart sounds  ABDOMEN: Soft, nontender, and nondistended. Bowel sounds present. No pain or masses on palpation  NERVOUS SYSTEM: + very lethargic, unresponsive to most questions and commands, neuro exam limited due to somnolence  EXTREMITIES:  2+ Peripheral Pulses. No appreciable edema  SKIN: warm dry                          12.6   8.51  )-----------( 201      ( 12 Oct 2023 03:49 )             40.0     10-12    141  |  106  |  25<H>  ----------------------------<  95  4.0   |  29  |  1.13    Ca    9.0      12 Oct 2023 03:49  Phos  4.0     10-12  Mg     2.5     10-12    TPro  7.1  /  Alb  3.2<L>  /  TBili  0.4  /  DBili  x   /  AST  12  /  ALT  12  /  AlkPhos  104  10-12    LIVER FUNCTIONS - ( 12 Oct 2023 03:49 )  Alb: 3.2 g/dL / Pro: 7.1 g/dL / ALK PHOS: 104 U/L / ALT: 12 U/L DA / AST: 12 U/L / GGT: x                   I&O's Summary    12 Oct 2023 07:01  -  13 Oct 2023 07:00  --------------------------------------------------------  IN: 160 mL / OUT: 500 mL / NET: -340 mL            CAPILLARY BLOOD GLUCOSE      RADIOLOGY & ADDITIONAL TESTS:                   PGY-1 Progress Note discussed with attending      PLEASE CONTACT ON CALL TEAM:  - On Call Team (Please refer to [facility]) FROM 5:00 PM - 8:30PM  - Nightfloat Team FROM 8:30 -7:30 AM    INTERVAL HPI/OVERNIGHT EVENTS: No acute events overnight. Patient assessed at bedside - patient was very somnolent. She opened her eyes only briefly to arousal, but correctly identified her  who was at bedside. Husbands #[phone]      REVIEW OF SYSTEMS:  CONSTITUTIONAL: No fever, weight loss, or fatigue  RESPIRATORY: No cough, wheezing,  or hemoptysis; No shortness of breath  CARDIOVASCULAR: No chest pain, palpitations, dizziness, or leg swelling  GASTROINTESTINAL: No abdominal pain. No nausea, vomiting, or hematemesis; No diarrhea or constipation. No melena or hematochezia.  GENITOURINARY: No dysuria, hematuria, or urinary frequency  NEUROLOGICAL: + Right sided weakness, No headaches, memory loss. No tremors  SKIN: No itching, burning, or rashes    MEDICATIONS  (STANDING):  amLODIPine   Tablet 10 milliGRAM(s) Oral daily  aspirin  chewable 81 milliGRAM(s) Oral daily  atorvastatin 80 milliGRAM(s) Oral at bedtime  cefTRIAXone   IVPB 1000 milliGRAM(s) IV Intermittent every 24 hours  chlorhexidine 2% Cloths 1 Application(s) Topical <User Schedule>  enoxaparin Injectable 40 milliGRAM(s) SubCutaneous every 24 hours  influenza  Vaccine (HIGH DOSE) 0.7 milliLiter(s) IntraMuscular once  labetalol 100 milliGRAM(s) Oral every 12 hours  OLANZapine 5 milliGRAM(s) Oral at bedtime    MEDICATIONS  (PRN):      Vital Signs Last 24 Hrs  T(C): 36.4 (13 Oct 2023 04:37), Max: 36.9 (12 Oct 2023 12:00)  T(F): 97.5 (13 Oct 2023 04:37), Max: 98.4 (12 Oct 2023 12:00)  HR: 80 (13 Oct 2023 04:37) (63 - 84)  BP: 148/85 (13 Oct 2023 06:35) (117/77 - 165/101)  BP(mean): 96 (12 Oct 2023 19:00) (88 - 100)  RR: 18 (13 Oct 2023 04:37) (11 - 19)  SpO2: 100% (13 Oct 2023 04:37) (99% - 100%)    Parameters below as of 13 Oct 2023 04:37  Patient On (Oxygen Delivery Method): room air        PHYSICAL EXAMINATION:  GENERAL: NAD  HEAD:  Atraumatic, Normocephalic  EYES:  Conjunctiva and sclera clear  NECK: Supple. No JVD. Normal thyroid  CHEST/LUNG: Clear to auscultation. No rales or wheezing  HEART: Regular rate and rhythm. No abnormal heart sounds  ABDOMEN: Soft, nontender, and nondistended. Bowel sounds present. No pain or masses on palpation  NERVOUS SYSTEM: + very lethargic, unresponsive to most questions and commands, neuro exam limited due to somnolence  EXTREMITIES:  2+ Peripheral Pulses. No appreciable edema  SKIN: warm dry                          12.6   8.51  )-----------( 201      ( 12 Oct 2023 03:49 )             40.0     10-12    141  |  106  |  25<H>  ----------------------------<  95  4.0   |  29  |  1.13    Ca    9.0      12 Oct 2023 03:49  Phos  4.0     10-12  Mg     2.5     10-12    TPro  7.1  /  Alb  3.2<L>  /  TBili  0.4  /  DBili  x   /  AST  12  /  ALT  12  /  AlkPhos  104  10-12    LIVER FUNCTIONS - ( 12 Oct 2023 03:49 )  Alb: 3.2 g/dL / Pro: 7.1 g/dL / ALK PHOS: 104 U/L / ALT: 12 U/L DA / AST: 12 U/L / GGT: x                   I&O's Summary    12 Oct 2023 07:01  -  13 Oct 2023 07:00  --------------------------------------------------------  IN: 160 mL / OUT: 500 mL / NET: -340 mL            CAPILLARY BLOOD GLUCOSE      RADIOLOGY & ADDITIONAL TESTS:                   PGY-1 Progress Note discussed with attending      PLEASE CONTACT ON CALL TEAM:  - On Call Team (Please refer to [facility]) FROM 5:00 PM - 8:30PM  - Nightfloat Team FROM 8:30 -7:30 AM    INTERVAL HPI/OVERNIGHT EVENTS: No acute events overnight. Patient assessed at bedside - patient was very somnolent. She opened her eyes only briefly to arousal, but correctly identified her  who was at bedside. Husbands #[phone]      REVIEW OF SYSTEMS:  Not obtained due to mental status    MEDICATIONS  (STANDING):  amLODIPine   Tablet 10 milliGRAM(s) Oral daily  aspirin  chewable 81 milliGRAM(s) Oral daily  atorvastatin 80 milliGRAM(s) Oral at bedtime  cefTRIAXone   IVPB 1000 milliGRAM(s) IV Intermittent every 24 hours  chlorhexidine 2% Cloths 1 Application(s) Topical <User Schedule>  enoxaparin Injectable 40 milliGRAM(s) SubCutaneous every 24 hours  influenza  Vaccine (HIGH DOSE) 0.7 milliLiter(s) IntraMuscular once  labetalol 100 milliGRAM(s) Oral every 12 hours  OLANZapine 5 milliGRAM(s) Oral at bedtime    MEDICATIONS  (PRN):      Vital Signs Last 24 Hrs  T(C): 36.4 (13 Oct 2023 04:37), Max: 36.9 (12 Oct 2023 12:00)  T(F): 97.5 (13 Oct 2023 04:37), Max: 98.4 (12 Oct 2023 12:00)  HR: 80 (13 Oct 2023 04:37) (63 - 84)  BP: 148/85 (13 Oct 2023 06:35) (117/77 - 165/101)  BP(mean): 96 (12 Oct 2023 19:00) (88 - 100)  RR: 18 (13 Oct 2023 04:37) (11 - 19)  SpO2: 100% (13 Oct 2023 04:37) (99% - 100%)    Parameters below as of 13 Oct 2023 04:37  Patient On (Oxygen Delivery Method): room air        PHYSICAL EXAMINATION:  GENERAL: NAD  HEAD:  Atraumatic, Normocephalic  EYES:  Conjunctiva and sclera clear  NECK: Supple. No JVD. Normal thyroid  CHEST/LUNG: Clear to auscultation. No rales or wheezing  HEART: Regular rate and rhythm. No abnormal heart sounds  ABDOMEN: Soft, nontender, and nondistended. Bowel sounds present. No pain or masses on palpation  NERVOUS SYSTEM: + very lethargic, unresponsive to most questions and commands, neuro exam limited due to somnolence  EXTREMITIES:  2+ Peripheral Pulses. No appreciable edema  SKIN: warm dry                          12.6   8.51  )-----------( 201      ( 12 Oct 2023 03:49 )             40.0     10-12    141  |  106  |  25<H>  ----------------------------<  95  4.0   |  29  |  1.13    Ca    9.0      12 Oct 2023 03:49  Phos  4.0     10-12  Mg     2.5     10-12    TPro  7.1  /  Alb  3.2<L>  /  TBili  0.4  /  DBili  x   /  AST  12  /  ALT  12  /  AlkPhos  104  10-12    LIVER FUNCTIONS - ( 12 Oct 2023 03:49 )  Alb: 3.2 g/dL / Pro: 7.1 g/dL / ALK PHOS: 104 U/L / ALT: 12 U/L DA / AST: 12 U/L / GGT: x                   I&O's Summary    12 Oct 2023 07:01  -  13 Oct 2023 07:00  --------------------------------------------------------  IN: 160 mL / OUT: 500 mL / NET: -340 mL            CAPILLARY BLOOD GLUCOSE      RADIOLOGY & ADDITIONAL TESTS:                   PGY-1 Progress Note discussed with attending      PLEASE CONTACT ON CALL TEAM:  - On Call Team (Please refer to [facility]) FROM 5:00 PM - 8:30PM  - Nightfloat Team FROM 8:30 -7:30 AM    INTERVAL HPI/OVERNIGHT EVENTS: No acute events overnight. Patient assessed at bedside - patient was very somnolent. She opened her eyes only briefly to arousal, but correctly identified her  who was at bedside. Husbands #[phone]      REVIEW OF SYSTEMS:  Not obtained due to mental status    MEDICATIONS  (STANDING):  amLODIPine   Tablet 10 milliGRAM(s) Oral daily  aspirin  chewable 81 milliGRAM(s) Oral daily  atorvastatin 80 milliGRAM(s) Oral at bedtime  cefTRIAXone   IVPB 1000 milliGRAM(s) IV Intermittent every 24 hours  chlorhexidine 2% Cloths 1 Application(s) Topical <User Schedule>  enoxaparin Injectable 40 milliGRAM(s) SubCutaneous every 24 hours  influenza  Vaccine (HIGH DOSE) 0.7 milliLiter(s) IntraMuscular once  labetalol 100 milliGRAM(s) Oral every 12 hours  OLANZapine 5 milliGRAM(s) Oral at bedtime    MEDICATIONS  (PRN):      Vital Signs Last 24 Hrs  T(C): 36.4 (13 Oct 2023 04:37), Max: 36.9 (12 Oct 2023 12:00)  T(F): 97.5 (13 Oct 2023 04:37), Max: 98.4 (12 Oct 2023 12:00)  HR: 80 (13 Oct 2023 04:37) (63 - 84)  BP: 148/85 (13 Oct 2023 06:35) (117/77 - 165/101)  BP(mean): 96 (12 Oct 2023 19:00) (88 - 100)  RR: 18 (13 Oct 2023 04:37) (11 - 19)  SpO2: 100% (13 Oct 2023 04:37) (99% - 100%)    Parameters below as of 13 Oct 2023 04:37  Patient On (Oxygen Delivery Method): room air        PHYSICAL EXAMINATION:  GENERAL: NAD  HEAD:  Atraumatic, Normocephalic  EYES:  Conjunctiva and sclera clear  NECK: Supple. No JVD. Normal thyroid  CHEST/LUNG: Clear to auscultation. No rales or wheezing  HEART: Regular rate and rhythm. No abnormal heart sounds  ABDOMEN: Soft, nontender, and nondistended. Bowel sounds present. No pain or masses on palpation  NERVOUS SYSTEM: + very lethargic, unresponsive to most questions and commands, neuro exam limited due to somnolence  EXTREMITIES:  2+ Peripheral Pulses. No appreciable edema  SKIN: warm dry                          12.6   8.51  )-----------( 201      ( 12 Oct 2023 03:49 )             40.0     10-12    141  |  106  |  25<H>  ----------------------------<  95  4.0   |  29  |  1.13    Ca    9.0      12 Oct 2023 03:49  Phos  4.0     10-12  Mg     2.5     10-12    TPro  7.1  /  Alb  3.2<L>  /  TBili  0.4  /  DBili  x   /  AST  12  /  ALT  12  /  AlkPhos  104  10-12    LIVER FUNCTIONS - ( 12 Oct 2023 03:49 )  Alb: 3.2 g/dL / Pro: 7.1 g/dL / ALK PHOS: 104 U/L / ALT: 12 U/L DA / AST: 12 U/L / GGT: x                   I&O's Summary    12 Oct 2023 07:01  -  13 Oct 2023 07:00  --------------------------------------------------------  IN: 160 mL / OUT: 500 mL / NET: -340 mL            CAPILLARY BLOOD GLUCOSE      RADIOLOGY & ADDITIONAL TESTS:    < from: MR Head No Cont (10.13.23 @ 13:08) >    IMPRESSION:  1. Acute-subacute infarcts left corona radiata-gangliocapsular region,   right cerebellar hemisphere and right basal ganglia, as above, without   evidence of associated recent hemorrhage no mass effect.  2. Multiple chronic infarcts and moderate altered white matter signal; a   nonspecific finding which statistically reflects chronic microvascular   ischemic change.  3. Multiple foci of susceptibility supratentorial andinfratentorial   regions, as above. While findings may reflect chronic microhemorrhages   secondary to hypertension, amyloid angiopathy may have an overlapping   imaging appearance.  4. Additional findings as above, including 1.2 and 0.5 cm right parotid   masses incidentally noted, of indeterminate neoplastic-malignant   potential. Nonemergent parotid ultrasound is recommended.    --- End of Report ---    < end of copied text >

## 2023-10-13 NOTE — BH CONSULTATION LIAISON ASSESSMENT NOTE - NSICDXBHSECONDARYDX_PSY_ALL_CORE
Brain aneurysm   I67.1  Hypertensive emergency   I16.1  HLD (hyperlipidemia)   E78.5  TIA (transient ischemic attack)   G45.9  Aneurysm, aorta, thoracic   I71.20  Pre-diabetes   R73.03  Prophylactic measure   Z29.9  Acute UTI   N39.0  Acute hyperactive delirium due to multiple etiologies   F05

## 2023-10-13 NOTE — PROGRESS NOTE ADULT - PROBLEM SELECTOR PLAN 6
Hypertensive Emergency ('s)  # 6mm Descending Aortic Aneurysm   #Hyperlipidemia  - EKG NSR , trop x1 negative   - s/p labetalol 10mg IV  - Per Neuro, no role for permissive hypertension - Goal /80, Treat BP > 140/90  - Changed Labetalol 100mg BID with hold parameters  - Increased Amlodipine to 10mg qd via NGT  - Atorvastatin 80mg qd  - Continue ASA 81mg Hypertensive Emergency ('s)  # 6mm Descending Aortic Aneurysm   #Hyperlipidemia  - EKG NSR , trop x1 negative   - s/p labetalol 10mg IV  - Per Neuro, no role for permissive hypertension - Goal /80, Treat BP > 140/90  - Changed Labetalol 100mg TID with hold parameters  - Continue Amlodipine to 10mg qd  - Continue Atorvastatin 80mg qd  - Continue ASA 81mg Patient has had multiple episodes of hyperactive delirium during hospital stay  - Consulted placed for Dr. Reese  - On Zyprexa 5mg QHS Pt x 6mm Descending Aortic Aneurysm   - 1 cm mural thrombi around the aneurysm   - No surgical intervention indicated at this time, anticoagulation as above  - BP control as above, goal /80

## 2023-10-13 NOTE — PROGRESS NOTE ADULT - PROBLEM SELECTOR PLAN 7
Pre-diabetes  - episode of hypoglycemia 10/11- f.s. 64 and given 1 amp of dextrose.   - s/p D5LR at 75cc/hr ivo of decreased oral intake Hypertensive Emergency ('s)  # 6mm Descending Aortic Aneurysm   #Hyperlipidemia  - EKG NSR , trop x1 negative   - s/p labetalol 10mg IV  - Per Neuro, no role for permissive hypertension - Goal /80, Treat BP > 140/90  - Changed Labetalol 100mg TID with hold parameters  - Continue Amlodipine to 10mg qd  - Continue Atorvastatin 80mg qd  - Continue ASA 81mg Pt w pmh of HLD  - c/w Atorvastatin 80mg qd Patient has had multiple episodes of hyperactive delirium during hospital stay  - Consulted placed for Dr. Reese  - On Zyprexa 5mg QHS CT head done for stroke revealed incidental parotid gland mass  - CT 10/13: 1.2 and 0.5 cm right parotid masses incidentally noted, of indeterminate neoplastic-malignant   potential.   - Nonemergent parotid ultrasound is recommended

## 2023-10-13 NOTE — PROGRESS NOTE ADULT - PROBLEM SELECTOR PLAN 1
Pt p/wCVA unknown last known well ([facility] 13 on admission) with right sided deficits, not a TNK candidate  - CTH 10/8 with no acute ischemia or hemorrhage. 4 mm outpouching projecting posteriorly inferiorly off the right supraclinoid internal carotid artery at its terminus, concerning for aneurysm.  - continue with neuro checks  - Official SLP done and patient with concern for oropharyngeal dysphagia started on puree diet with mildly thick liquids.   - ECHO bubble study neg.  - Per Neuro, MRI to be repeated once patient is downgraded. Hypertensive Emergency ('s)  - EKG NSR , trop x1 negative   - Per Neuro, no role for permissive hypertension - Goal /80, Treat BP > 140/90  - Labetalol increased to 100mg TID  - c/w Amlodipine 10mg   - Monitor vitals, adjust meds as needed Hypertensive Emergency ('s)  - EKG NSR , trop x1 negative   - s/p labetalol 10mg IV  - Per Neuro, no role for permissive hypertension - Goal /80, Treat BP > 140/90  - Changed Labetalol 100mg TID  - Continue Amlodipine 10mg qd  - Continue Atorvastatin 80mg qd  - Continue ASA 81mg  - Monitor vitals, adjust meds as needed

## 2023-10-13 NOTE — PROGRESS NOTE ADULT - PROBLEM SELECTOR PLAN 9
- on Lovenox DVT PPx: Lovenox Pt diagnosed w Pre-diabetes (A1C 5.9)  - No meds indicated at this time  - Monitor sugars

## 2023-10-13 NOTE — BH CONSULTATION LIAISON ASSESSMENT NOTE - NSBHCHARTREVIEWINVESTIGATE_PSY_A_CORE FT
< from: 12 Lead ECG (10.09.23 @ 01:07) >    Ventricular Rate 83 BPM    Atrial Rate 83 BPM    P-R Interval 168 ms    QRS Duration 86 ms    Q-T Interval 404 ms    QTC Calculation(Bazett) 474 ms    P Axis 49 degrees    R Axis 33 degrees    T Axis 46 degrees    Diagnosis Line Normal sinus rhythm  Possible Left atrial enlargement  Borderline ECG    Confirmed by ALEJO ROSARIO, [facility] ([id number]) on 10/10/2023 8:37:20 AM    < end of copied text >

## 2023-10-14 LAB
ALBUMIN SERPL ELPH-MCNC: 3.1 G/DL — LOW (ref 3.5–5)
ALP SERPL-CCNC: 102 U/L — SIGNIFICANT CHANGE UP (ref 40–120)
ALT FLD-CCNC: 15 U/L DA — SIGNIFICANT CHANGE UP (ref 10–60)
ANION GAP SERPL CALC-SCNC: 5 MMOL/L — SIGNIFICANT CHANGE UP (ref 5–17)
AST SERPL-CCNC: 16 U/L — SIGNIFICANT CHANGE UP (ref 10–40)
BASOPHILS # BLD AUTO: 0.03 K/UL — SIGNIFICANT CHANGE UP (ref 0–0.2)
BASOPHILS NFR BLD AUTO: 0.4 % — SIGNIFICANT CHANGE UP (ref 0–2)
BILIRUB SERPL-MCNC: 0.4 MG/DL — SIGNIFICANT CHANGE UP (ref 0.2–1.2)
BUN SERPL-MCNC: 27 MG/DL — HIGH (ref 7–18)
CALCIUM SERPL-MCNC: 9.1 MG/DL — SIGNIFICANT CHANGE UP (ref 8.4–10.5)
CHLORIDE SERPL-SCNC: 108 MMOL/L — SIGNIFICANT CHANGE UP (ref 96–108)
CO2 SERPL-SCNC: 29 MMOL/L — SIGNIFICANT CHANGE UP (ref 22–31)
CREAT SERPL-MCNC: 0.97 MG/DL — SIGNIFICANT CHANGE UP (ref 0.5–1.3)
EGFR: 60 ML/MIN/1.73M2 — SIGNIFICANT CHANGE UP
EOSINOPHIL # BLD AUTO: 0.13 K/UL — SIGNIFICANT CHANGE UP (ref 0–0.5)
EOSINOPHIL NFR BLD AUTO: 1.8 % — SIGNIFICANT CHANGE UP (ref 0–6)
GLUCOSE SERPL-MCNC: 95 MG/DL — SIGNIFICANT CHANGE UP (ref 70–99)
HCT VFR BLD CALC: 39.8 % — SIGNIFICANT CHANGE UP (ref 34.5–45)
HGB BLD-MCNC: 12.3 G/DL — SIGNIFICANT CHANGE UP (ref 11.5–15.5)
IMM GRANULOCYTES NFR BLD AUTO: 0.3 % — SIGNIFICANT CHANGE UP (ref 0–0.9)
LYMPHOCYTES # BLD AUTO: 1.79 K/UL — SIGNIFICANT CHANGE UP (ref 1–3.3)
LYMPHOCYTES # BLD AUTO: 24.8 % — SIGNIFICANT CHANGE UP (ref 13–44)
MAGNESIUM SERPL-MCNC: 2.4 MG/DL — SIGNIFICANT CHANGE UP (ref 1.6–2.6)
MCHC RBC-ENTMCNC: 28 PG — SIGNIFICANT CHANGE UP (ref 27–34)
MCHC RBC-ENTMCNC: 30.9 GM/DL — LOW (ref 32–36)
MCV RBC AUTO: 90.5 FL — SIGNIFICANT CHANGE UP (ref 80–100)
MONOCYTES # BLD AUTO: 0.54 K/UL — SIGNIFICANT CHANGE UP (ref 0–0.9)
MONOCYTES NFR BLD AUTO: 7.5 % — SIGNIFICANT CHANGE UP (ref 2–14)
NEUTROPHILS # BLD AUTO: 4.7 K/UL — SIGNIFICANT CHANGE UP (ref 1.8–7.4)
NEUTROPHILS NFR BLD AUTO: 65.2 % — SIGNIFICANT CHANGE UP (ref 43–77)
NRBC # BLD: 0 /100 WBCS — SIGNIFICANT CHANGE UP (ref 0–0)
PHOSPHATE SERPL-MCNC: 3.9 MG/DL — SIGNIFICANT CHANGE UP (ref 2.5–4.5)
PLATELET # BLD AUTO: 231 K/UL — SIGNIFICANT CHANGE UP (ref 150–400)
POTASSIUM SERPL-MCNC: 4.2 MMOL/L — SIGNIFICANT CHANGE UP (ref 3.5–5.3)
POTASSIUM SERPL-SCNC: 4.2 MMOL/L — SIGNIFICANT CHANGE UP (ref 3.5–5.3)
PROT SERPL-MCNC: 6.8 G/DL — SIGNIFICANT CHANGE UP (ref 6–8.3)
RBC # BLD: 4.4 M/UL — SIGNIFICANT CHANGE UP (ref 3.8–5.2)
RBC # FLD: 13.6 % — SIGNIFICANT CHANGE UP (ref 10.3–14.5)
SODIUM SERPL-SCNC: 142 MMOL/L — SIGNIFICANT CHANGE UP (ref 135–145)
WBC # BLD: 7.21 K/UL — SIGNIFICANT CHANGE UP (ref 3.8–10.5)
WBC # FLD AUTO: 7.21 K/UL — SIGNIFICANT CHANGE UP (ref 3.8–10.5)

## 2023-10-14 PROCEDURE — 99233 SBSQ HOSP IP/OBS HIGH 50: CPT | Mod: GC

## 2023-10-14 RX ORDER — LISINOPRIL 2.5 MG/1
2.5 TABLET ORAL DAILY
Refills: 0 | Status: DISCONTINUED | OUTPATIENT
Start: 2023-10-14 | End: 2023-10-15

## 2023-10-14 RX ORDER — HALOPERIDOL DECANOATE 100 MG/ML
1 INJECTION INTRAMUSCULAR EVERY 6 HOURS
Refills: 0 | Status: DISCONTINUED | OUTPATIENT
Start: 2023-10-14 | End: 2023-10-17

## 2023-10-14 RX ORDER — OLANZAPINE 15 MG/1
2.5 TABLET, FILM COATED ORAL ONCE
Refills: 0 | Status: DISCONTINUED | OUTPATIENT
Start: 2023-10-14 | End: 2023-10-15

## 2023-10-14 RX ADMIN — CHLORHEXIDINE GLUCONATE 1 APPLICATION(S): 213 SOLUTION TOPICAL at 05:08

## 2023-10-14 RX ADMIN — AMLODIPINE BESYLATE 10 MILLIGRAM(S): 2.5 TABLET ORAL at 05:08

## 2023-10-14 RX ADMIN — Medication 100 MILLIGRAM(S): at 05:08

## 2023-10-14 RX ADMIN — CEFTRIAXONE 100 MILLIGRAM(S): 500 INJECTION, POWDER, FOR SOLUTION INTRAMUSCULAR; INTRAVENOUS at 01:08

## 2023-10-14 RX ADMIN — LISINOPRIL 2.5 MILLIGRAM(S): 2.5 TABLET ORAL at 21:03

## 2023-10-14 RX ADMIN — Medication 100 MILLIGRAM(S): at 21:04

## 2023-10-14 RX ADMIN — HALOPERIDOL DECANOATE 1 MILLIGRAM(S): 100 INJECTION INTRAMUSCULAR at 15:30

## 2023-10-14 RX ADMIN — Medication 81 MILLIGRAM(S): at 12:59

## 2023-10-14 NOTE — PROGRESS NOTE ADULT - ASSESSMENT
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 Cont PRN haldol and standing Seroquel at night   Avoid anticholinergics, benzodiazepines, limit lines/tubes/tethers/restraints, encourage frequent reorientation/reassurance by staff, encourage good sleep hygiene, adequate lighting  Cont diet as mental status improved   Aspiration and fall precaution   BP still at 150s, cont Labetalol to TID, added low dose lisinopril  Completed Ceftriaxone   No acute intervention for the aneurysm as per CT surgery, outpatient follow up  Outpatient follow up for parotid gland mass   Plan of care was discussed with  all questions and concerns were addressed and care was aligned with patient's wishes.

## 2023-10-14 NOTE — PROGRESS NOTE ADULT - SUBJECTIVE AND OBJECTIVE BOX
Patient is a 78y old  Female who presents with a chief complaint of Stroke (13 Oct 2023 07:44)    Patient was seen and examined with  at bedside    reports patient is at baseline, finished her breakfast including an extra yogurt   Later in the afternoon patient had an episode of agitation    INTERVAL HPI/OVERNIGHT EVENTS:  T(C): 36.8 (10-14-23 @ 13:37), Max: 36.8 (10-14-23 @ 13:37)  HR: 75 (10-14-23 @ 13:37) (61 - 76)  BP: 155/98 (10-14-23 @ 13:37) (119/74 - 155/98)  RR: 18 (10-14-23 @ 13:37) (18 - 18)  SpO2: 98% (10-14-23 @ 13:37) (96% - 98%)  Wt(kg): --  I&O's Summary      REVIEW OF SYSTEMS: denies fever, chills, SOB, palpitations, chest pain, abdominal pain; does not answer further ROS    MEDICATIONS  (STANDING):  amLODIPine   Tablet 10 milliGRAM(s) Oral daily  aspirin  chewable 81 milliGRAM(s) Oral daily  atorvastatin 80 milliGRAM(s) Oral at bedtime  chlorhexidine 2% Cloths 1 Application(s) Topical <User Schedule>  enoxaparin Injectable 40 milliGRAM(s) SubCutaneous every 24 hours  influenza  Vaccine (HIGH DOSE) 0.7 milliLiter(s) IntraMuscular once  labetalol 100 milliGRAM(s) Oral every 8 hours  QUEtiapine 12.5 milliGRAM(s) Oral daily    MEDICATIONS  (PRN):  haloperidol    Injectable 1 milliGRAM(s) IntraMuscular every 6 hours PRN Agitation      PHYSICAL EXAM:  GENERAL: NAD, well-groomed, well-developed  HEAD:  Atraumatic, Normocephalic  NERVOUS SYSTEM:  Alert & Oriented x1, right sided strength UE and LE 1/5  CHEST/LUNG: Clear to auscultation bilaterally; No rales, rhonchi, wheezing, or rubs  HEART: Regular rate and rhythm; No murmurs, rubs, or gallops  ABDOMEN: Soft, Nontender, Nondistended; Bowel sounds present  EXTREMITIES:  2+ Peripheral Pulses, No clubbing, cyanosis, or edema  SKIN: No rashes or lesions    LABS:                        12.3   7.21  )-----------( 231      ( 14 Oct 2023 07:20 )             39.8     10-14    142  |  108  |  27<H>  ----------------------------<  95  4.2   |  29  |  0.97    Ca    9.1      14 Oct 2023 07:20  Phos  3.9     10-14  Mg     2.4     10-14    TPro  6.8  /  Alb  3.1<L>  /  TBili  0.4  /  DBili  x   /  AST  16  /  ALT  15  /  AlkPhos  102  10-14      Urinalysis Basic - ( 14 Oct 2023 07:20 )    Color: x / Appearance: x / SG: x / pH: x  Gluc: 95 mg/dL / Ketone: x  / Bili: x / Urobili: x   Blood: x / Protein: x / Nitrite: x   Leuk Esterase: x / RBC: x / WBC x   Sq Epi: x / Non Sq Epi: x / Bacteria: x      CAPILLARY BLOOD GLUCOSE

## 2023-10-15 LAB
ALBUMIN SERPL ELPH-MCNC: 3.2 G/DL — LOW (ref 3.5–5)
ALP SERPL-CCNC: 97 U/L — SIGNIFICANT CHANGE UP (ref 40–120)
ALT FLD-CCNC: 17 U/L DA — SIGNIFICANT CHANGE UP (ref 10–60)
ANION GAP SERPL CALC-SCNC: 6 MMOL/L — SIGNIFICANT CHANGE UP (ref 5–17)
AST SERPL-CCNC: 16 U/L — SIGNIFICANT CHANGE UP (ref 10–40)
BASOPHILS # BLD AUTO: 0.03 K/UL — SIGNIFICANT CHANGE UP (ref 0–0.2)
BASOPHILS NFR BLD AUTO: 0.4 % — SIGNIFICANT CHANGE UP (ref 0–2)
BILIRUB SERPL-MCNC: 0.4 MG/DL — SIGNIFICANT CHANGE UP (ref 0.2–1.2)
BUN SERPL-MCNC: 28 MG/DL — HIGH (ref 7–18)
CALCIUM SERPL-MCNC: 9.2 MG/DL — SIGNIFICANT CHANGE UP (ref 8.4–10.5)
CHLORIDE SERPL-SCNC: 108 MMOL/L — SIGNIFICANT CHANGE UP (ref 96–108)
CO2 SERPL-SCNC: 29 MMOL/L — SIGNIFICANT CHANGE UP (ref 22–31)
CREAT SERPL-MCNC: 0.89 MG/DL — SIGNIFICANT CHANGE UP (ref 0.5–1.3)
EGFR: 67 ML/MIN/1.73M2 — SIGNIFICANT CHANGE UP
EOSINOPHIL # BLD AUTO: 0.11 K/UL — SIGNIFICANT CHANGE UP (ref 0–0.5)
EOSINOPHIL NFR BLD AUTO: 1.4 % — SIGNIFICANT CHANGE UP (ref 0–6)
GLUCOSE SERPL-MCNC: 96 MG/DL — SIGNIFICANT CHANGE UP (ref 70–99)
HCT VFR BLD CALC: 41.5 % — SIGNIFICANT CHANGE UP (ref 34.5–45)
HGB BLD-MCNC: 12.9 G/DL — SIGNIFICANT CHANGE UP (ref 11.5–15.5)
IMM GRANULOCYTES NFR BLD AUTO: 0.2 % — SIGNIFICANT CHANGE UP (ref 0–0.9)
LYMPHOCYTES # BLD AUTO: 1.52 K/UL — SIGNIFICANT CHANGE UP (ref 1–3.3)
LYMPHOCYTES # BLD AUTO: 18.9 % — SIGNIFICANT CHANGE UP (ref 13–44)
MAGNESIUM SERPL-MCNC: 2.3 MG/DL — SIGNIFICANT CHANGE UP (ref 1.6–2.6)
MCHC RBC-ENTMCNC: 28 PG — SIGNIFICANT CHANGE UP (ref 27–34)
MCHC RBC-ENTMCNC: 31.1 GM/DL — LOW (ref 32–36)
MCV RBC AUTO: 90 FL — SIGNIFICANT CHANGE UP (ref 80–100)
MONOCYTES # BLD AUTO: 0.54 K/UL — SIGNIFICANT CHANGE UP (ref 0–0.9)
MONOCYTES NFR BLD AUTO: 6.7 % — SIGNIFICANT CHANGE UP (ref 2–14)
NEUTROPHILS # BLD AUTO: 5.81 K/UL — SIGNIFICANT CHANGE UP (ref 1.8–7.4)
NEUTROPHILS NFR BLD AUTO: 72.4 % — SIGNIFICANT CHANGE UP (ref 43–77)
NRBC # BLD: 0 /100 WBCS — SIGNIFICANT CHANGE UP (ref 0–0)
PHOSPHATE SERPL-MCNC: 3.6 MG/DL — SIGNIFICANT CHANGE UP (ref 2.5–4.5)
PLATELET # BLD AUTO: 195 K/UL — SIGNIFICANT CHANGE UP (ref 150–400)
POTASSIUM SERPL-MCNC: 4 MMOL/L — SIGNIFICANT CHANGE UP (ref 3.5–5.3)
POTASSIUM SERPL-SCNC: 4 MMOL/L — SIGNIFICANT CHANGE UP (ref 3.5–5.3)
PROT SERPL-MCNC: 7 G/DL — SIGNIFICANT CHANGE UP (ref 6–8.3)
RBC # BLD: 4.61 M/UL — SIGNIFICANT CHANGE UP (ref 3.8–5.2)
RBC # FLD: 13.4 % — SIGNIFICANT CHANGE UP (ref 10.3–14.5)
SODIUM SERPL-SCNC: 143 MMOL/L — SIGNIFICANT CHANGE UP (ref 135–145)
WBC # BLD: 8.03 K/UL — SIGNIFICANT CHANGE UP (ref 3.8–10.5)
WBC # FLD AUTO: 8.03 K/UL — SIGNIFICANT CHANGE UP (ref 3.8–10.5)

## 2023-10-15 PROCEDURE — 99233 SBSQ HOSP IP/OBS HIGH 50: CPT | Mod: GC

## 2023-10-15 RX ORDER — LISINOPRIL 2.5 MG/1
2.5 TABLET ORAL ONCE
Refills: 0 | Status: COMPLETED | OUTPATIENT
Start: 2023-10-15 | End: 2023-10-15

## 2023-10-15 RX ORDER — QUETIAPINE FUMARATE 200 MG/1
12.5 TABLET, FILM COATED ORAL
Refills: 0 | Status: DISCONTINUED | OUTPATIENT
Start: 2023-10-15 | End: 2023-10-17

## 2023-10-15 RX ORDER — HALOPERIDOL DECANOATE 100 MG/ML
1 INJECTION INTRAMUSCULAR ONCE
Refills: 0 | Status: DISCONTINUED | OUTPATIENT
Start: 2023-10-15 | End: 2023-10-16

## 2023-10-15 RX ORDER — CLOPIDOGREL BISULFATE 75 MG/1
75 TABLET, FILM COATED ORAL DAILY
Refills: 0 | Status: DISCONTINUED | OUTPATIENT
Start: 2023-10-15 | End: 2023-10-18

## 2023-10-15 RX ORDER — LISINOPRIL 2.5 MG/1
5 TABLET ORAL DAILY
Refills: 0 | Status: DISCONTINUED | OUTPATIENT
Start: 2023-10-16 | End: 2023-10-18

## 2023-10-15 RX ADMIN — QUETIAPINE FUMARATE 12.5 MILLIGRAM(S): 200 TABLET, FILM COATED ORAL at 18:57

## 2023-10-15 RX ADMIN — CLOPIDOGREL BISULFATE 75 MILLIGRAM(S): 75 TABLET, FILM COATED ORAL at 13:09

## 2023-10-15 RX ADMIN — Medication 100 MILLIGRAM(S): at 06:18

## 2023-10-15 RX ADMIN — ENOXAPARIN SODIUM 40 MILLIGRAM(S): 100 INJECTION SUBCUTANEOUS at 18:57

## 2023-10-15 RX ADMIN — LISINOPRIL 2.5 MILLIGRAM(S): 2.5 TABLET ORAL at 06:19

## 2023-10-15 RX ADMIN — HALOPERIDOL DECANOATE 1 MILLIGRAM(S): 100 INJECTION INTRAMUSCULAR at 02:41

## 2023-10-15 RX ADMIN — Medication 81 MILLIGRAM(S): at 13:09

## 2023-10-15 RX ADMIN — AMLODIPINE BESYLATE 10 MILLIGRAM(S): 2.5 TABLET ORAL at 06:18

## 2023-10-15 RX ADMIN — CHLORHEXIDINE GLUCONATE 1 APPLICATION(S): 213 SOLUTION TOPICAL at 06:37

## 2023-10-15 NOTE — PROGRESS NOTE ADULT - PROBLEM SELECTOR PLAN 4
ICA Aneurysm  - CTH shows 4 mm outpouching projecting posteriorly inferiorly off the right supraclinoid internal carotid artery at its terminus, concerning for aneurysm  - No surgical intervention at this time  - Neurology, Dr. Morgan following

## 2023-10-15 NOTE — PROGRESS NOTE ADULT - PROBLEM SELECTOR PLAN 3
Patient has had multiple episodes of hyperactive delirium during hospital stay  - c/w Seroquel 12.5mg qhs  - Haldol 1mg PRN  - Psych Dr. Reese, consulted

## 2023-10-15 NOTE — PROGRESS NOTE ADULT - PROBLEM SELECTOR PLAN 5
Pt x 6mm Descending Aortic Aneurysm   - 1 cm mural thrombi around the aneurysm   - No surgical intervention indicated at this time, anticoagulation as above  - BP control as above, goal /80

## 2023-10-15 NOTE — PROGRESS NOTE ADULT - PROBLEM SELECTOR PLAN 1
Hypertensive Emergency ('s)  - EKG NSR , trop x1 negative   - Per Neuro, no role for permissive hypertension - Goal /80, Treat BP > 140/90  - BP still elevated  - c/w Labetalol 100mg TID  - c/w Amlodipine 10mg qd  - Lisinopril increased 2.5>5mg qd

## 2023-10-15 NOTE — PROGRESS NOTE ADULT - ATTENDING COMMENTS
patient was seen and examined with  at bedside  Somnolent due to PRN haldol for agitation    ICU Vital Signs Last 24 Hrs  T(C): 36.3 (15 Oct 2023 13:54), Max: 36.9 (14 Oct 2023 20:37)  T(F): 97.3 (15 Oct 2023 13:54), Max: 98.4 (14 Oct 2023 20:37)  HR: 65 (15 Oct 2023 13:54) (64 - 78)  BP: 124/97 (15 Oct 2023 13:54) (121/77 - 166/98)  BP(mean): --  ABP: --  ABP(mean): --  RR: 18 (15 Oct 2023 13:54) (18 - 19)  SpO2: 95% (15 Oct 2023 13:54) (95% - 99%)    O2 Parameters below as of 15 Oct 2023 13:54  Patient On (Oxygen Delivery Method): room air    P/E:  NAD  Somnolent, not able to follow commands   CHEST/LUNG: Clear to auscultation bilaterally; No rales, rhonchi, wheezing, or rubs  HEART: Regular rate and rhythm; No murmurs, rubs, or gallops  ABDOMEN: Soft, Nontender, Nondistended; Bowel sounds present  EXTREMITIES:  2+ Peripheral Pulses, No clubbing, cyanosis, or edema  SKIN: No rashes or lesions    labs noted    A/P:  Acute delirium possibly multifactorial from vascular dementia, UTI, hospital acquired, TIA in the setting of dementia  Hypertensive emergency resolved  Descending thoracic aneurysm 6cm with intra mural thrombi   Possible 4mm ICA aneurysm  Parotid gland mass  UTI  HLD    Plan:  Cont PRN haldol and standing Seroquel at night (order corrected)  Avoid anticholinergics, benzodiazepines, limit lines/tubes/tethers/restraints, encourage frequent reorientation/reassurance by staff, encourage good sleep hygiene, adequate lighting  Cont diet as mental status improved   Aspiration and fall precaution   Increased lisinopril, BP improved   Completed Ceftriaxone   No acute intervention for the aneurysm as per CT surgery, outpatient follow up  Outpatient follow up for parotid gland mass   Plan of care was discussed with  all questions and concerns were addressed and care was aligned with patient's wishes.

## 2023-10-15 NOTE — PROGRESS NOTE ADULT - PROBLEM SELECTOR PLAN 2
Pt p/w right sided deficits unknown last known well (NIHHS 13 on admission)   - CTH 10/8 with no acute ischemia or hemorrhage  - ECHO bubble study neg  - MRI 10/13: Acute-subacute infarcts left corona radiata-gangliocapsular region, right cerebellar hemisphere and right basal ganglia, as above, without evidence of associated recent hemorrhage no mass effect  - Per neuro recs, c/w Atorvastatin 80mg & ASA 81mg  - BP control as above  - Per S&S recs, pt started on puree diet with mildly thick liquids for concerns of oropharyngeal dysphagia  - Neurology, Dr. Morgan following Pt p/w right sided deficits unknown last known well (NIHHS 13 on admission)   - CTH 10/8 with no acute ischemia or hemorrhage  - ECHO bubble study neg  - MRI 10/13: Acute-subacute infarcts left corona radiata-gangliocapsular region, right cerebellar hemisphere and right basal ganglia, as above, without evidence of associated recent hemorrhage no mass effect  - Per neuro recs, c/w Atorvastatin 80mg & ASA 81mg, will start plavix 75mg qd for 21 days  - BP control as above  - Per S&S recs, pt started on puree diet with mildly thick liquids for concerns of oropharyngeal dysphagia  - Neurology, Dr. Morgan following

## 2023-10-15 NOTE — PROGRESS NOTE ADULT - PROBLEM SELECTOR PLAN 6
CT head done for stroke revealed incidental parotid gland mass  - CT 10/13: 1.2 and 0.5 cm right parotid masses incidentally noted, of indeterminate neoplastic-malignant   potential.   - Nonemergent parotid ultrasound is recommended

## 2023-10-15 NOTE — PROGRESS NOTE ADULT - SUBJECTIVE AND OBJECTIVE BOX
PGY-1 Progress Note discussed with attending      PLEASE CONTACT ON CALL TEAM:  - On Call Team (Please refer to Sabra) FROM 5:00 PM - 8:30PM  - Nightfloat Team FROM 8:30 -7:30 AM    INTERVAL HPI/OVERNIGHT EVENTS:   No acute events overnight.  Patient examined at bedside this AM.  Patient was given haldol last night and is lethargic this am. Unable to stay awake enough to fully respond to questions/commands.        REVIEW OF SYSTEMS:  Unable to obtain    MEDICATIONS  (STANDING):  amLODIPine   Tablet 10 milliGRAM(s) Oral daily  aspirin  chewable 81 milliGRAM(s) Oral daily  atorvastatin 80 milliGRAM(s) Oral at bedtime  chlorhexidine 2% Cloths 1 Application(s) Topical <User Schedule>  enoxaparin Injectable 40 milliGRAM(s) SubCutaneous every 24 hours  influenza  Vaccine (HIGH DOSE) 0.7 milliLiter(s) IntraMuscular once  labetalol 100 milliGRAM(s) Oral every 8 hours  lisinopril 2.5 milliGRAM(s) Oral once  QUEtiapine 12.5 milliGRAM(s) Oral <User Schedule>    MEDICATIONS  (PRN):  haloperidol    Injectable 1 milliGRAM(s) IntraMuscular every 6 hours PRN Agitation  haloperidol    Injectable 1 milliGRAM(s) IntraMuscular once PRN Agitation      Vital Signs Last 24 Hrs  T(C): 36.2 (15 Oct 2023 05:16), Max: 36.9 (14 Oct 2023 20:37)  T(F): 97.2 (15 Oct 2023 05:16), Max: 98.4 (14 Oct 2023 20:37)  HR: 78 (15 Oct 2023 05:16) (64 - 78)  BP: 166/98 (15 Oct 2023 05:16) (120/77 - 166/98)  BP(mean): --  RR: 19 (15 Oct 2023 05:16) (18 - 19)  SpO2: 97% (15 Oct 2023 05:16) (97% - 99%)    Parameters below as of 15 Oct 2023 05:16  Patient On (Oxygen Delivery Method): room air        PHYSICAL EXAMINATION:  GENERAL: NAD, lethargic, lying in bed  HEAD: Atraumatic, Normocephalic  EYES: Conjunctiva and sclera clear  NECK: Supple. No JVD. Normal thyroid  CHEST/LUNG: Soft/Decreased breath sounds bilaterally. No rales or wheezing  HEART: Regular rate and rhythm. No abnormal heart sounds  ABDOMEN: Soft, nontender, and nondistended. Bowel sounds present. No pain or masses on palpation  NERVOUS SYSTEM: Arousable to taping with calling her name but very lethargic and unable to obtain comprehensive neuro exam   EXTREMITIES:  2+ Peripheral Pulses. No appreciable edema  SKIN: warm dry                          12.9   8.03  )-----------( 195      ( 15 Oct 2023 07:35 )             41.5     10-15    143  |  108  |  28<H>  ----------------------------<  96  4.0   |  29  |  0.89    Ca    9.2      15 Oct 2023 07:35  Phos  3.6     10-15  Mg     2.3     10-15    TPro  7.0  /  Alb  3.2<L>  /  TBili  0.4  /  DBili  x   /  AST  16  /  ALT  17  /  AlkPhos  97  10-15    LIVER FUNCTIONS - ( 15 Oct 2023 07:35 )  Alb: 3.2 g/dL / Pro: 7.0 g/dL / ALK PHOS: 97 U/L / ALT: 17 U/L DA / AST: 16 U/L / GGT: x                   I&O's Summary          CAPILLARY BLOOD GLUCOSE      RADIOLOGY & ADDITIONAL TESTS:

## 2023-10-15 NOTE — PROGRESS NOTE ADULT - ASSESSMENT
78 year old female with pmh of pre-diabetes and HLD presents after a fall and was found to have right sided weakness with NIHHS score of 13. CTH showed no acute infarcts, but did show possible ICA aneurysm. CT A/P showed a 6cm descending thoracic aneurysm. Patient was found to have HTN emergency and due to the large size of the thoracic aneurysm, she was admitted to ICU for closer BP monitoring with labetalol gtt, approved for downgrade to telemetry on 10/12 for further medical management.           78 year old female with pmh of pre-diabetes and HLD presented for right sided weakness, found to have a NIHHS score of 13 and BP of 239/148. CTH showed no acute infarcts but subsequent MRI showed acute/subacute stroke. Pt was initially admitted to ICU for closer BP monitoring, approved for downgrade to telemetry on 10/12 for further medical management.

## 2023-10-16 LAB
ALBUMIN SERPL ELPH-MCNC: 3.2 G/DL — LOW (ref 3.5–5)
ALP SERPL-CCNC: 105 U/L — SIGNIFICANT CHANGE UP (ref 40–120)
ALT FLD-CCNC: 19 U/L DA — SIGNIFICANT CHANGE UP (ref 10–60)
ANION GAP SERPL CALC-SCNC: 9 MMOL/L — SIGNIFICANT CHANGE UP (ref 5–17)
AST SERPL-CCNC: 16 U/L — SIGNIFICANT CHANGE UP (ref 10–40)
BASOPHILS # BLD AUTO: 0.02 K/UL — SIGNIFICANT CHANGE UP (ref 0–0.2)
BASOPHILS NFR BLD AUTO: 0.2 % — SIGNIFICANT CHANGE UP (ref 0–2)
BILIRUB SERPL-MCNC: 0.4 MG/DL — SIGNIFICANT CHANGE UP (ref 0.2–1.2)
BUN SERPL-MCNC: 35 MG/DL — HIGH (ref 7–18)
CALCIUM SERPL-MCNC: 8.9 MG/DL — SIGNIFICANT CHANGE UP (ref 8.4–10.5)
CHLORIDE SERPL-SCNC: 106 MMOL/L — SIGNIFICANT CHANGE UP (ref 96–108)
CO2 SERPL-SCNC: 27 MMOL/L — SIGNIFICANT CHANGE UP (ref 22–31)
CREAT SERPL-MCNC: 1.04 MG/DL — SIGNIFICANT CHANGE UP (ref 0.5–1.3)
EGFR: 55 ML/MIN/1.73M2 — LOW
EOSINOPHIL # BLD AUTO: 0.1 K/UL — SIGNIFICANT CHANGE UP (ref 0–0.5)
EOSINOPHIL NFR BLD AUTO: 1.2 % — SIGNIFICANT CHANGE UP (ref 0–6)
GLUCOSE SERPL-MCNC: 107 MG/DL — HIGH (ref 70–99)
HCT VFR BLD CALC: 39.7 % — SIGNIFICANT CHANGE UP (ref 34.5–45)
HGB BLD-MCNC: 12.5 G/DL — SIGNIFICANT CHANGE UP (ref 11.5–15.5)
IMM GRANULOCYTES NFR BLD AUTO: 0.4 % — SIGNIFICANT CHANGE UP (ref 0–0.9)
LYMPHOCYTES # BLD AUTO: 1.68 K/UL — SIGNIFICANT CHANGE UP (ref 1–3.3)
LYMPHOCYTES # BLD AUTO: 20.5 % — SIGNIFICANT CHANGE UP (ref 13–44)
MAGNESIUM SERPL-MCNC: 2.5 MG/DL — SIGNIFICANT CHANGE UP (ref 1.6–2.6)
MCHC RBC-ENTMCNC: 28.3 PG — SIGNIFICANT CHANGE UP (ref 27–34)
MCHC RBC-ENTMCNC: 31.5 GM/DL — LOW (ref 32–36)
MCV RBC AUTO: 90 FL — SIGNIFICANT CHANGE UP (ref 80–100)
MONOCYTES # BLD AUTO: 0.49 K/UL — SIGNIFICANT CHANGE UP (ref 0–0.9)
MONOCYTES NFR BLD AUTO: 6 % — SIGNIFICANT CHANGE UP (ref 2–14)
NEUTROPHILS # BLD AUTO: 5.88 K/UL — SIGNIFICANT CHANGE UP (ref 1.8–7.4)
NEUTROPHILS NFR BLD AUTO: 71.7 % — SIGNIFICANT CHANGE UP (ref 43–77)
NRBC # BLD: 0 /100 WBCS — SIGNIFICANT CHANGE UP (ref 0–0)
PHOSPHATE SERPL-MCNC: 3.7 MG/DL — SIGNIFICANT CHANGE UP (ref 2.5–4.5)
PLATELET # BLD AUTO: 223 K/UL — SIGNIFICANT CHANGE UP (ref 150–400)
POTASSIUM SERPL-MCNC: 4.4 MMOL/L — SIGNIFICANT CHANGE UP (ref 3.5–5.3)
POTASSIUM SERPL-SCNC: 4.4 MMOL/L — SIGNIFICANT CHANGE UP (ref 3.5–5.3)
PROT SERPL-MCNC: 7.2 G/DL — SIGNIFICANT CHANGE UP (ref 6–8.3)
RBC # BLD: 4.41 M/UL — SIGNIFICANT CHANGE UP (ref 3.8–5.2)
RBC # FLD: 13.3 % — SIGNIFICANT CHANGE UP (ref 10.3–14.5)
SODIUM SERPL-SCNC: 142 MMOL/L — SIGNIFICANT CHANGE UP (ref 135–145)
WBC # BLD: 8.2 K/UL — SIGNIFICANT CHANGE UP (ref 3.8–10.5)
WBC # FLD AUTO: 8.2 K/UL — SIGNIFICANT CHANGE UP (ref 3.8–10.5)

## 2023-10-16 PROCEDURE — 99232 SBSQ HOSP IP/OBS MODERATE 35: CPT | Mod: GC

## 2023-10-16 RX ORDER — CLOPIDOGREL BISULFATE 75 MG/1
1 TABLET, FILM COATED ORAL
Qty: 0 | Refills: 0 | DISCHARGE
Start: 2023-10-16

## 2023-10-16 RX ORDER — ATORVASTATIN CALCIUM 80 MG/1
1 TABLET, FILM COATED ORAL
Qty: 0 | Refills: 0 | DISCHARGE
Start: 2023-10-16

## 2023-10-16 RX ORDER — LABETALOL HCL 100 MG
1 TABLET ORAL
Qty: 0 | Refills: 0 | DISCHARGE
Start: 2023-10-16

## 2023-10-16 RX ORDER — LISINOPRIL 2.5 MG/1
1 TABLET ORAL
Qty: 0 | Refills: 0 | DISCHARGE
Start: 2023-10-16

## 2023-10-16 RX ORDER — ASPIRIN/CALCIUM CARB/MAGNESIUM 324 MG
1 TABLET ORAL
Qty: 0 | Refills: 0 | DISCHARGE
Start: 2023-10-16

## 2023-10-16 RX ORDER — AMLODIPINE BESYLATE 2.5 MG/1
1 TABLET ORAL
Qty: 0 | Refills: 0 | DISCHARGE
Start: 2023-10-16

## 2023-10-16 RX ORDER — QUETIAPINE FUMARATE 200 MG/1
0.5 TABLET, FILM COATED ORAL
Qty: 15 | Refills: 0
Start: 2023-10-16 | End: 2023-11-14

## 2023-10-16 RX ADMIN — ATORVASTATIN CALCIUM 80 MILLIGRAM(S): 80 TABLET, FILM COATED ORAL at 21:43

## 2023-10-16 RX ADMIN — CHLORHEXIDINE GLUCONATE 1 APPLICATION(S): 213 SOLUTION TOPICAL at 05:22

## 2023-10-16 RX ADMIN — ENOXAPARIN SODIUM 40 MILLIGRAM(S): 100 INJECTION SUBCUTANEOUS at 17:05

## 2023-10-16 RX ADMIN — LISINOPRIL 5 MILLIGRAM(S): 2.5 TABLET ORAL at 05:24

## 2023-10-16 RX ADMIN — Medication 81 MILLIGRAM(S): at 11:28

## 2023-10-16 RX ADMIN — Medication 100 MILLIGRAM(S): at 05:24

## 2023-10-16 RX ADMIN — CLOPIDOGREL BISULFATE 75 MILLIGRAM(S): 75 TABLET, FILM COATED ORAL at 11:28

## 2023-10-16 RX ADMIN — Medication 100 MILLIGRAM(S): at 17:03

## 2023-10-16 RX ADMIN — AMLODIPINE BESYLATE 10 MILLIGRAM(S): 2.5 TABLET ORAL at 05:25

## 2023-10-16 RX ADMIN — Medication 100 MILLIGRAM(S): at 21:43

## 2023-10-16 NOTE — DISCHARGE NOTE PROVIDER - HOSPITAL COURSE
78 year old female with no known PMH presented to [facility] ED on 10/8 after being found on the floor by her  and noted to have right sided weakness. On arrival to ED, BP was 239/148 and a stroke team was activated with patient scoring 13 on NIHHS. Patient was not a TNK candidate given her unknown last known well. Initial CT head was negative for any acute infarcts but did show a possible ICA aneurysm and CT A/P showed a large thoracic aneurysm. Pt was admitted to ICU for hypertensive emergency where she was started on a labetalol drip. On exam patient with persistent RUE and RLE paralysis, lethargy, intermittent confusion and agitation. On 10/9 an official speech and swallow was done where patient was found to have dysphagia and started on a pureed diet with mildly thickened liquids. Cardiothoracic surgery and neurology consult was placed at [facility] for evaluation of descending aortic aneurysm with mural thrombus and an internal carotid aneurysm. Pt was not accepted given that there was no acute surgical intervention being offered and instead recommended a 2 month follow-up at an outpatient clinic. The pt was transitioned onto PO antihypertensives and was downgraded to inpatient tele on 10/12. There an MRI was done and revealed that the pt had indeed suffered an acute/subacute stroke. She was placed on plavix in addition to aspirin and high dose statin. Additionally, he BP remained high and so Lisinopril was added to her antihypertensive medication regimen with lead to her pressures within an acceptable range. She also consistently demonstrated agitation and episodes of delirium at night. Psych was consulted and so the pt was started on medications to keep her calm and allow medical treatments to be given. PT was consulted and recommended the pt be discharged on subacute rehab, as a result once deemed medically cleared the pt was discharged to [facility] on 10/7. 78 year old female with no known PMH presented to [facility] ED on 10/8 after being found on the floor by her  and noted to have right sided weakness. On arrival to ED, BP was 239/148 and a stroke team was activated with patient scoring 13 on NIHHS. Patient was not a TNK candidate given her unknown last known well. Initial CT head was negative for any acute infarcts but did show a possible ICA aneurysm and CT A/P showed a large thoracic aneurysm. Pt was admitted to ICU for hypertensive emergency where she was started on a labetalol drip. On exam patient with persistent RUE and RLE paralysis, lethargy, intermittent confusion and agitation. On 10/9 an official speech and swallow was done where patient was found to have dysphagia and started on a pureed diet with mildly thickened liquids. Cardiothoracic surgery and neurology consult was placed at [facility] for evaluation of descending aortic aneurysm with mural thrombus and an internal carotid aneurysm. Pt was not accepted given that there was no acute surgical intervention being offered and instead recommended a 2 month follow-up at an outpatient clinic. The pt was transitioned onto PO antihypertensives and was downgraded to inpatient tele on 10/12. There an MRI was done and revealed that the pt had indeed suffered an acute/subacute stroke. She was placed on plavix in addition to aspirin and high dose statin. Additionally, he BP remained high and so Lisinopril was added to her antihypertensive medication regimen with lead to her pressures within an acceptable range. She also consistently demonstrated agitation and episodes of delirium at night. Psych was consulted and so the pt was started on medications to keep her calm and allow medical treatments to be given. PT was consulted and recommended the pt be discharged on subacute rehab, as a result once deemed medically cleared the pt was discharged to [facility] on 10/16. 78 year old female with no known PMH presented to [facility] ED on 10/8 after being found on the floor by her  and noted to have right sided weakness. On arrival to ED, BP was 239/148 and a stroke team was activated with patient scoring 13 on NIHHS. Patient was not a TNK candidate given her unknown last known well. Initial CT head was negative for any acute infarcts but did show a possible ICA aneurysm and CT A/P showed a large thoracic aneurysm. Pt was admitted to ICU for hypertensive emergency where she was started on a labetalol drip. On exam patient with persistent RUE and RLE paralysis, lethargy, intermittent confusion and agitation. On 10/9 an official speech and swallow was done where patient was found to have dysphagia and started on a pureed diet with mildly thickened liquids. Cardiothoracic surgery and neurology consult was placed at [facility] for evaluation of descending aortic aneurysm with mural thrombus and an internal carotid aneurysm. Pt was not accepted given that there was no acute surgical intervention being offered and instead recommended a 2 month follow-up at an outpatient clinic. The pt was transitioned onto PO antihypertensives and was downgraded to inpatient tele on 10/12. There an MRI was done and revealed that the pt had indeed suffered an acute/subacute stroke. She was placed on plavix in addition to aspirin and high dose statin. Additionally, he BP remained high and so Lisinopril was added to her antihypertensive medication regimen with lead to her pressures within an acceptable range. She also consistently demonstrated agitation and episodes of delirium at night. Psych was consulted and so the pt was started on medications to keep her calm and allow medical treatments to be given. PT was consulted and recommended the pt be discharged on subacute rehab, as a result once deemed medically cleared the pt was discharged to [facility] on 10/17. 78 year old female with no known PMH presented to [facility] ED on 10/8 after being found on the floor by her  and noted to have right sided weakness. On arrival to ED, BP was 239/148 and a stroke team was activated with patient scoring 13 on NIHHS. Patient was not a TNK candidate given her unknown last known well. Initial CT head was negative for any acute infarcts but did show a possible ICA aneurysm and CT A/P showed a large thoracic aneurysm. Pt was admitted to ICU for hypertensive emergency where she was started on a labetalol drip. On exam patient with persistent RUE and RLE paralysis, lethargy, intermittent confusion and agitation. On 10/9 an official speech and swallow was done where patient was found to have dysphagia and started on a pureed diet with mildly thickened liquids. Cardiothoracic surgery and neurology consult was placed at [facility] for evaluation of descending aortic aneurysm with mural thrombus and an internal carotid aneurysm. Pt was not accepted given that there was no acute surgical intervention being offered and instead recommended a 2 month follow-up at an outpatient clinic. The pt was transitioned onto PO antihypertensives and was downgraded to inpatient tele on 10/12. There an MRI was done and revealed that the pt had indeed suffered an acute/subacute stroke. She was placed on plavix in addition to aspirin and high dose statin. Additionally, he BP remained high and so Lisinopril was added to her antihypertensive medication regimen with lead to her pressures within an acceptable range. She also consistently demonstrated agitation and episodes of delirium at night. Psych was consulted and so the pt was started on medications to keep her calm and allow medical treatments to be given. PT was consulted and recommended the pt be discharged on subacute rehab, as a result once deemed medically cleared the pt was discharged to [facility] on 10/18.

## 2023-10-16 NOTE — DISCHARGE NOTE PROVIDER - NSDCFUADDINST_GEN_ALL_CORE_FT
PLEASE FOLLOW UP WITH DR. VELMA MCKOY WHILE IN REHAB  FOLLOW UP WITH PCP/ NEUROLOGY AND THORACIC SURGERY ON DISCHARGE FROM REHAB  PLEASE KEEP HEAD OF BED ELEVATED TO 45 DEGREE AND ASPIRATION PRECAUTIONS WHILE FEEDING. FEED SLOWLY WITH ASSISTANCE AND ADEQUATE TIME BETWEEN FOOD BOLUS TO ALLOW SWALLOW.   DISCONTINUE PLAVIX AFTER 3 WEEKS  MAY GIVE FAMOTIDINE TWICE DAILY FOR 3 WEEKS WHILE ON ASPIRIN AND PLAVIX

## 2023-10-16 NOTE — DISCHARGE NOTE PROVIDER - NSDCDCMDCOMP_GEN_ALL_CORE
This document is complete and the patient is ready for discharge. Telemonitor   ro ACS  cards fu  cw current meds  check CE

## 2023-10-16 NOTE — PROGRESS NOTE ADULT - SUBJECTIVE AND OBJECTIVE BOX
PGY-1 Progress Note discussed with attending      PLEASE CONTACT ON CALL TEAM:  - On Call Team (Please refer to Sabra) FROM 5:00 PM - 8:30PM  - Nightfloat Team FROM 8:30 -7:30 AM    INTERVAL HPI/OVERNIGHT EVENTS:   No acute events overnight.  Patient examined at bedside this AM.  Patient denies acute complaints.      REVIEW OF SYSTEMS:  CONSTITUTIONAL: +Fatigue. No weight loss, or fever  RESPIRATORY: No cough, wheezing,  or hemoptysis; No shortness of breath  CARDIOVASCULAR: No chest pain, palpitations, dizziness, or leg swelling  GASTROINTESTINAL: No abdominal pain. No nausea, vomiting, or hematemesis; No diarrhea or constipation. No melena or hematochezia.  GENITOURINARY: No dysuria, hematuria, or urinary frequency  NEUROLOGICAL: Right sided weakness   SKIN: No itching, burning, or rashes    MEDICATIONS  (STANDING):  amLODIPine   Tablet 10 milliGRAM(s) Oral daily  aspirin  chewable 81 milliGRAM(s) Oral daily  atorvastatin 80 milliGRAM(s) Oral at bedtime  chlorhexidine 2% Cloths 1 Application(s) Topical <User Schedule>  clopidogrel Tablet 75 milliGRAM(s) Oral daily  enoxaparin Injectable 40 milliGRAM(s) SubCutaneous every 24 hours  influenza  Vaccine (HIGH DOSE) 0.7 milliLiter(s) IntraMuscular once  labetalol 100 milliGRAM(s) Oral every 8 hours  lisinopril 5 milliGRAM(s) Oral daily  QUEtiapine 12.5 milliGRAM(s) Oral <User Schedule>    MEDICATIONS  (PRN):  haloperidol    Injectable 1 milliGRAM(s) IntraMuscular every 6 hours PRN Agitation  haloperidol    Injectable 1 milliGRAM(s) IntraMuscular once PRN Agitation      Vital Signs Last 24 Hrs  T(C): 36.7 (16 Oct 2023 05:24), Max: 36.7 (15 Oct 2023 17:48)  T(F): 98 (16 Oct 2023 05:24), Max: 98.1 (15 Oct 2023 17:48)  HR: 77 (16 Oct 2023 09:45) (64 - 82)  BP: 110/76 (16 Oct 2023 09:45) (100/73 - 128/83)  BP(mean): --  RR: 17 (16 Oct 2023 05:24) (17 - 19)  SpO2: 98% (16 Oct 2023 09:45) (95% - 98%)    Parameters below as of 16 Oct 2023 09:45  Patient On (Oxygen Delivery Method): room air        PHYSICAL EXAMINATION:  GENERAL: NAD, lying comfortably in bed  HEAD: Atraumatic, Normocephalic  EYES: Conjunctiva and sclera clear  NECK: Supple. No JVD. Normal thyroid  CHEST/LUNG: Clear to auscultation. No rales or wheezing  HEART: Regular rate and rhythm. No abnormal heart sounds  ABDOMEN: Soft, nontender, and nondistended. Bowel sounds present. No pain or masses on palpation  NERVOUS SYSTEM:  Alert & Oriented X2, right sided upper/lower extremity weakness and facial droop noted  EXTREMITIES:  2+ Peripheral Pulses. No appreciable edema  SKIN: warm dry                          12.5   8.20  )-----------( 223      ( 16 Oct 2023 06:19 )             39.7     10-16    142  |  106  |  35<H>  ----------------------------<  107<H>  4.4   |  27  |  1.04    Ca    8.9      16 Oct 2023 06:19  Phos  3.7     10-16  Mg     2.5     10-16    TPro  7.2  /  Alb  3.2<L>  /  TBili  0.4  /  DBili  x   /  AST  16  /  ALT  19  /  AlkPhos  105  10-16    LIVER FUNCTIONS - ( 16 Oct 2023 06:19 )  Alb: 3.2 g/dL / Pro: 7.2 g/dL / ALK PHOS: 105 U/L / ALT: 19 U/L DA / AST: 16 U/L / GGT: x                   I&O's Summary          CAPILLARY BLOOD GLUCOSE      RADIOLOGY & ADDITIONAL TESTS:

## 2023-10-16 NOTE — PROGRESS NOTE ADULT - PROBLEM SELECTOR PLAN 2
Pt p/w right sided deficits unknown last known well (NIHHS 13 on admission)   - CTH 10/8 with no acute ischemia or hemorrhage  - ECHO bubble study neg  - MRI 10/13: Acute-subacute infarcts left corona radiata-gangliocapsular region, right cerebellar hemisphere and right basal ganglia, as above, without evidence of associated recent hemorrhage no mass effect  - Per neuro recs, c/w Atorvastatin 80mg & ASA 81mg, will start plavix 75mg qd for 21 days  - BP control as above  - Per S&S recs, pt started on puree diet with mildly thick liquids for concerns of oropharyngeal dysphagia  - Neurology, Dr. Morgan following

## 2023-10-16 NOTE — DISCHARGE NOTE PROVIDER - NSDCMRMEDTOKEN_GEN_ALL_CORE_FT
amLODIPine 10 mg oral tablet: 1 tab(s) orally once a day  aspirin 81 mg oral tablet, chewable: 1 tab(s) orally once a day  atorvastatin 80 mg oral tablet: 1 tab(s) orally once a day (at bedtime)  clopidogrel 75 mg oral tablet: 1 tab(s) orally once a day  labetalol 100 mg oral tablet: 1 tab(s) orally every 8 hours  lisinopril 5 mg oral tablet: 1 tab(s) orally once a day  Seroquel 25 mg oral tablet: 0.5 tab(s) orally once a day (at bedtime)   amLODIPine 10 mg oral tablet: 1 tab(s) orally once a day  aspirin 81 mg oral tablet, chewable: 1 tab(s) orally once a day  atorvastatin 80 mg oral tablet: 1 tab(s) orally once a day (at bedtime)  clopidogrel 75 mg oral tablet: 1 tab(s) orally once a day  labetalol 200 mg oral tablet: 1 tab(s) orally every 12 hours  lisinopril 5 mg oral tablet: 1 tab(s) orally once a day  ocular lubricant ophthalmic solution: 1 drop(s) to each affected eye every 6 hours  Seroquel 25 mg oral tablet: 0.5 tab(s) orally once a day (at bedtime)   amLODIPine 10 mg oral tablet: 1 tab(s) orally once a day  aspirin 81 mg oral tablet, chewable: 1 tab(s) orally once a day  atorvastatin 80 mg oral tablet: 1 tab(s) orally once a day (at bedtime)  clopidogrel 75 mg oral tablet: 1 tab(s) orally once a day  famotidine 20 mg oral tablet: 1 tab(s) orally 2 times a day  labetalol 200 mg oral tablet: 1 tab(s) orally every 12 hours  lisinopril 5 mg oral tablet: 1 tab(s) orally once a day  ocular lubricant ophthalmic solution: 1 drop(s) to each affected eye every 6 hours  Seroquel 25 mg oral tablet: 0.5 tab(s) orally once a day (at bedtime)

## 2023-10-16 NOTE — PROGRESS NOTE ADULT - PROBLEM SELECTOR PLAN 4
ICA Aneurysm  - CTH shows 4 mm outpouching projecting posteriorly inferiorly off the right supraclinoid internal carotid artery at its terminus, concerning for aneurysm  - No surgical intervention at this time  - Neurology, Dr. Mogran following

## 2023-10-16 NOTE — PROGRESS NOTE ADULT - ATTENDING COMMENTS
patient was seen and examined with  at bedside  Participated in PT and sat on bed side chair     ICU Vital Signs Last 24 Hrs  T(C): 36.4 (16 Oct 2023 14:01), Max: 36.7 (16 Oct 2023 05:24)  T(F): 97.5 (16 Oct 2023 14:01), Max: 98 (16 Oct 2023 05:24)  HR: 81 (16 Oct 2023 17:08) (68 - 81)  BP: 124/86 (16 Oct 2023 17:08) (109/79 - 134/102)  BP(mean): 111 (16 Oct 2023 14:01) (92 - 111)  ABP: --  ABP(mean): --  RR: 17 (16 Oct 2023 17:08) (17 - 19)  SpO2: 98% (16 Oct 2023 17:08) (98% - 98%)    O2 Parameters below as of 16 Oct 2023 17:08  Patient On (Oxygen Delivery Method): room air      P/E:  NAD  AAOx2, no new focal deficit, right sided weakness same as before   CHEST/LUNG: Clear to auscultation bilaterally; No rales, rhonchi, wheezing, or rubs  HEART: Regular rate and rhythm; No murmurs, rubs, or gallops  ABDOMEN: Soft, Nontender, Nondistended; Bowel sounds present  EXTREMITIES:  2+ Peripheral Pulses, No clubbing, cyanosis, or edema  SKIN: No rashes or lesions    labs noted    A/P:  Acute hospital acquired delirium possibly multifactorial from vascular dementia, UTI, TIA in the setting of dementia  Hypertensive emergency resolved  Descending thoracic aneurysm 6cm with intra mural thrombi   Possible 4mm ICA aneurysm  Parotid gland mass  UTI  HLD    Plan:  Cont Seroquel at night   Avoid anticholinergics, benzodiazepines, limit lines/tubes/tethers/restraints, encourage frequent reorientation/reassurance by staff, encourage good sleep hygiene, adequate lighting  Cont diet as mental status improved   Aspiration and fall precaution   BP improved   Completed Ceftriaxone   No acute intervention for the aneurysm as per CT surgery, outpatient follow up  Outpatient follow up for parotid gland mass   Plan of care was discussed with  all questions and concerns were addressed and care was aligned with patient's wishes.  CM and SW for safe discharge  Possible DC in 1-2 days after better behavior control

## 2023-10-16 NOTE — DISCHARGE NOTE PROVIDER - ATTENDING DISCHARGE PHYSICAL EXAMINATION:
Psych: AAO x3  Neuro: No gross focal deficits; Right sided weakness 2/5  CVS: S1S2 present, regular, no edema  Resp: BLAE+, No wheeze or Rhonchi  GI: Soft, BS+, Non tender, non distended  Extr: No  calf tenderness B/L Lower extremities  Skin: Warm and moist without any rashes   Patient seen and examined with spouse at bedside  Patient admitted to ICU with HTN urgency found to have acute CVA with Right hemiparesis seen by Speech and swallow recommended Pureed diet with thick liquids  Patient is much more alert and awake today, took few spoons thick liquids with me without any difficulty swallowing    P/E:  Psych: AAO x2.5  Neuro: No gross focal deficits; Right sided weakness 1-2/5; Left sided strength 4/5  CVS: S1S2 present, regular, no edema  Resp: BLAE+, No wheeze or Rhonchi  GI: Soft, BS+, Non tender, non distended  Extr: No  calf tenderness B/L Lower extremities  Skin: Warm and moist without any rashes'    Plan:  Patient clinically improved with much improved encephalopathy   Discussed with spouse will transfer to Rehab at [facility]   d/w Spouse needs aggressive PT in the next 2 weeks to see if any recovery   Also d/w DR. VELMA Daniel who will follow patient in Rehab to get speech and swallow evaluation to see if can be advanced to Soft and bite sized food which may be more palatable and improve oral intake  Discussed with Housestaff PGY1 DR. Staley and PGY3 DR. Black and RN   see discharge instructions reviewed and updated

## 2023-10-16 NOTE — DISCHARGE NOTE PROVIDER - NPI NUMBER (FOR SYSADMIN USE ONLY) :
[[id number] [[id number]],[[id number]],[[id number]] [[id number]],[[id number]]

## 2023-10-16 NOTE — PROGRESS NOTE ADULT - PROBLEM SELECTOR PLAN 1
Hypertensive Emergency ('s)  - EKG NSR , trop x1 negative   - Per Neuro, no role for permissive hypertension - Goal /80, Treat BP > 140/90  - BP still elevated  - c/w Labetalol 100mg TID  - c/w Amlodipine 10mg qd  - Lisinopril increased 2.5>5mg qd Hypertensive Emergency ('s)  - EKG NSR , trop x1 negative   - Per Neuro, no role for permissive hypertension - Goal /80, Treat BP > 140/90  - BP now within acceptable limits  - c/w Labetalol 100mg TID  - c/w Amlodipine 10mg qd  - c/w Lisinopril 5mg qd

## 2023-10-16 NOTE — PROGRESS NOTE ADULT - PROBLEM SELECTOR PLAN 9
Pt w acute E. Coli UTI  - s/p abx course of CTX Pt w acute E. Coli UTI  - s/p abx course of CTX  - RESOLVED

## 2023-10-16 NOTE — PROGRESS NOTE ADULT - ASSESSMENT
78 year old female with pmh of pre-diabetes and HLD presented for right sided weakness, found to have a NIHHS score of 13 and BP of 239/148. CTH showed no acute infarcts but subsequent MRI showed acute/subacute stroke. Pt was initially admitted to ICU for closer BP monitoring, approved for downgrade to telemetry on 10/12 for further medical management.

## 2023-10-16 NOTE — PROGRESS NOTE ADULT - PROBLEM SELECTOR PROBLEM 3
Acute hyperactive delirium due to multiple etiologies Closure 3 Information: This tab is for additional flaps and grafts above and beyond our usual structured repairs.  Please note if you enter information here it will not currently bill and you will need to add the billing information manually.

## 2023-10-16 NOTE — DISCHARGE NOTE PROVIDER - NSDCCPCAREPLAN_GEN_ALL_CORE_FT
PRINCIPAL DISCHARGE DIAGNOSIS  Diagnosis: Hypertensive emergency  Assessment and Plan of Treatment: When you arrived at the hospital, your blood pressure was severely elevated. While your primary complaint at that time was your right sided paralysis, your blood pressure level was likely a significat contributing factor that led to these issues and therefore controlling your blood pressure became one of our primary goals. You were initally admitted to the ICU where a medication IV drip was started to help lower your blood pressure. While this led to some improvement, additional medications were required. Eventually, your blood pressure was able to be adequately controlled with a regimen of oral medications (Labetalol 100mg 3x a day, Lisinpril 5mg 1x a day, Amlodipine 10mg 1x a day). Please continue taking all these medications as prescribed and follow up with your PCP as well as a cardiologist and neurologist within a week of your discharge to ensure this issue remains well controlled.      SECONDARY DISCHARGE DIAGNOSES  Diagnosis: CVA (cerebrovascular accident)  Assessment and Plan of Treatment: Your primary complaint when arriving at the hospital was motor weakness in your upper/lower extremities on the right side. On initial assessment in the ED, a stroke team was activated and you were treated in the ICU. While inital CT imaging did not reveal a stroke, repeat imaging with an MRI showed that you had indeed suffered an acute/subacute stroke. You were started on medications (Aspirin, Atorvastatin, and Plavix) to help prevent growth or recurrance of the stroke. You were seen by neurolgists and physical therapy specialists who recommended the you be discharged to subacute rehab, as a result, once deemed medically cleared, you were approved for discharg to [facility] on 10/16. Please continue taking these medications as prescribed (Aspirin and Atorvostatin for the foreseeable future and Plavix for another 19 days).    Diagnosis: Thoracic aortic aneurysm  Assessment and Plan of Treatment:      PRINCIPAL DISCHARGE DIAGNOSIS  Diagnosis: Hypertensive emergency  Assessment and Plan of Treatment: When you arrived at the hospital, your blood pressure was severely elevated. While your primary complaint at that time was your right sided paralysis, your blood pressure level was likely a significat contributing factor that led to these issues and therefore controlling your blood pressure became one of our primary goals. You were initally admitted to the ICU where a medication IV drip was started to help lower your blood pressure. While this led to some improvement, additional medications were required. Eventually, your blood pressure was able to be adequately controlled with a regimen of oral medications (Labetalol 100mg 3x a day, Lisinpril 5mg 1x a day, Amlodipine 10mg 1x a day). Please continue taking all these medications as prescribed and follow up with your PCP as well as a cardiologist (Dr. Gilbert) and neurologist (Dr. Morgan) within a week of your discharge to ensure this issue remains well controlled.      SECONDARY DISCHARGE DIAGNOSES  Diagnosis: CVA (cerebrovascular accident)  Assessment and Plan of Treatment: Your primary complaint when arriving at the hospital was motor weakness in your upper/lower extremities on the right side. On initial assessment in the ED, a stroke team was activated and you were treated in the ICU. While inital CT imaging did not reveal a stroke, repeat imaging with an MRI showed that you had indeed suffered an acute/subacute stroke. You were started on medications (Aspirin, Atorvastatin, and Plavix) to help prevent growth or recurrance of the stroke. You were seen by neurolgists and physical therapy specialists who recommended the you be discharged to subacute rehab, as a result, once deemed medically cleared, you were approved for discharg to [facility] on 10/16. Please continue taking these medications as prescribed (Aspirin and Atorvostatin for the foreseeable future and Plavix for another 19 days).    Diagnosis: Thoracic aortic aneurysm  Assessment and Plan of Treatment: Imaging done while you were in the hispital revealed that you had an aneurysm in the descending portion of your thoracic aorta. Cardiothoracic surgery was consulted and they recommended against acute surgical intervention but instead to follow up as an outpatient. Please make an appointment with the cardiothoracic surgery specialist and follow up with them soon after leaving the hospital to ensure this issue remains well taken care of (their information is provided in this dishcarge paperwork). In the meantime, please take your blood pressure medications as prescribed.    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 Neurology was consulted and they recommended against acute surgical intervention but instead to follow up as an outpatient. Please make an appointment with the neurology specialist and follow up with them soon after leaving the hospital to ensure this issue remains well taken care of (their information is provided in this dishcarge paperwork). In the meantime, please take your blood pressure medications as prescribed.    Diagnosis: Mass of parotid gland  Assessment and Plan of Treatment: Imaging done while you were in the [facility] revealed that you had an unknown mass in your parotid gland. While there is no acute intervention required for this problem, it should be followed up as an outpatient including an ultrasound of the area to better visualize the mass. Please follow up with your primary care provider to make sure this issue remains well managed and to help coordinate an outpatient ultrasound.    Diagnosis: Acute hyperactive delirium due to multiple etiologies  Assessment and Plan of Treatment: While you were in the hospital, you had multiple episodes of nightly agitation and delirium. We consulted a psychiatrist on how best to medically treat you and prescribed a medication (Seroquel) based on their recommendation. Please continue taking this medication as precribed and follow up with your primary care doctor soon after your discharge to ensure this issue remains well controlled.    Diagnosis: Acute UTI  Assessment and Plan of Treatment: While in the hospital, labwork showed that you had developed a UTI. You were treated with antibiotics without any related complications. Please make sure to see your primary care doctor soon after your discharge to ensure this issue remains well controlled.    Diagnosis: Pre-diabetes  Assessment and Plan of Treatment: You have previously been diagnosed with prediabetes. While in the hospital, we monitored your blood sugar and it remained within an acceptable range. Please make sure to see your primary care doctor soon after your discharge to ensure this issue remains well controlled.     PRINCIPAL DISCHARGE DIAGNOSIS  Diagnosis: Hypertensive emergency  Assessment and Plan of Treatment: When you arrived at the hospital, your blood pressure was severely elevated. While your primary complaint at that time was your right sided paralysis, your blood pressure level was likely a significat contributing factor that led to these issues and therefore controlling your blood pressure became one of our primary goals. You were initally admitted to the ICU where a medication IV drip was started to help lower your blood pressure. While this led to some improvement, additional medications were required. Eventually, your blood pressure was able to be adequately controlled with a regimen of oral medications (Labetalol 200mg 2x a day, Lisinpril 5mg 1x a day, Amlodipine 10mg 1x a day). Please continue taking all these medications as prescribed and follow up with your PCP as well as a cardiologist (Dr. Gilbert) and neurologist (Dr. Morgan) within a week of your discharge to ensure this issue remains well controlled.      SECONDARY DISCHARGE DIAGNOSES  Diagnosis: CVA (cerebrovascular accident)  Assessment and Plan of Treatment: Your primary complaint when arriving at the hospital was motor weakness in your upper/lower extremities on the right side. On initial assessment in the ED, a stroke team was activated and you were treated in the ICU. While inital CT imaging did not reveal a stroke, repeat imaging with an MRI showed that you had indeed suffered an acute/subacute stroke. You were started on medications (Aspirin, Atorvastatin, and Plavix) to help prevent growth or recurrance of the stroke. You were seen by neurolgists and physical therapy specialists who recommended the you be discharged to subacute rehab, as a result, once deemed medically cleared, you were approved for discharg to [facility] on 10/18. Please continue taking these medications as prescribed (Aspirin and Atorvostatin for the foreseeable future and Plavix for another 21 days).    Diagnosis: Thoracic aortic aneurysm  Assessment and Plan of Treatment: Imaging done while you were in the hispital revealed that you had an aneurysm in the descending portion of your thoracic aorta. Cardiothoracic surgery was consulted and they recommended against acute surgical intervention but instead to follow up as an outpatient. Please make an appointment with the cardiothoracic surgery specialist and follow up with them soon after leaving the hospital to ensure this issue remains well taken care of (their information is provided in this dishcarge paperwork). In the meantime, please take your blood pressure medications as prescribed.    Diagnosis: Acute hyperactive delirium due to multiple etiologies  Assessment and Plan of Treatment: While you were in the hospital, you had multiple episodes of nightly agitation and delirium. We consulted a psychiatrist on how best to medically treat you and prescribed a medication (Seroquel) based on their recommendation. Please continue taking this medication as precribed (12.5mg at 8pm) and follow up with your primary care doctor soon after your discharge to ensure this issue remains well controlled.    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 Neurology was consulted and they recommended against acute surgical intervention but instead to follow up as an outpatient. Please make an appointment with the neurology specialist and follow up with them soon after leaving the hospital to ensure this issue remains well taken care of (their information is provided in this dishcarge paperwork). In the meantime, please take your blood pressure medications as prescribed.    Diagnosis: Mass of parotid gland  Assessment and Plan of Treatment: Imaging done while you were in the [facility] revealed that you had an unknown mass in your parotid gland. While there is no acute intervention required for this problem, it should be followed up as an outpatient including an ultrasound of the area to better visualize the mass. Please follow up with your primary care provider to make sure this issue remains well managed and to help coordinate an outpatient ultrasound.    Diagnosis: Acute UTI  Assessment and Plan of Treatment: While in the hospital, labwork showed that you had developed a UTI. You were treated with antibiotics without any related complications. Please make sure to see your primary care doctor soon after your discharge to ensure this issue remains well controlled.    Diagnosis: Pre-diabetes  Assessment and Plan of Treatment: You have previously been diagnosed with prediabetes. While in the hospital, we monitored your blood sugar and it remained within an acceptable range. Please make sure to see your primary care doctor soon after your discharge to ensure this issue remains well controlled.     PRINCIPAL DISCHARGE DIAGNOSIS  Diagnosis: CVA (cerebrovascular accident)  Assessment and Plan of Treatment: You presented to the hospital with right sided weakness in your upper/lower extremities. On initial assessment in the ED, a stroke team was activated. You was not a candidate for a clot bursting medication (tpA) as you nwere out of the window perioed. You was admitted to ICU with significantly elevated Blood Pressures.  While inital CT imaging did not reveal a stroke, repeat imaging with an MRI showed that you had indeed suffered an acute/subacute stroke resulting in paralyis on right side. You were started on medications (Aspirin, Atorvastatin, and Plavix) to help prevent worsening of stroke area or recurrance of the stroke. You were seen by Neurolgist Dr. Morgan and physical therapy specialists who recommended the you be discharged to subacute rehab, as a result, once deemed medically cleared, you were approved for discharg to [facility] on 10/18/23.   You was seen by speech therapist recommended Pureed diet with thick liquids which need to be further evalauated in the Rehab for ability to tolerate soft and bite sized food and if able to take thin liquids  PLEASE KEEP HEAD OF BED ELEVATED TO 45 DEGREE AND ASPIRATION PRECAUTIONS WHILE FEEDING. FEED SLOWLY WITH ASSISTANCE AND ADEQUATE TIME BETWEEN FOOD BOLUS TO ALLOW SWALLOW.    Please continue taking these medications as prescribed (Aspirin and Atorvostatin for the foreseeable future and Plavix for another 21 days).  DISCONTINUE PLAVIX AFTER 3 WEEKS;   ENCOURAGE ACTIVE PHYSICAL THERPAY TO IMPROVE OUTCOME      SECONDARY DISCHARGE DIAGNOSES  Diagnosis: Hypertensive emergency  Assessment and Plan of Treatment: When you arrived at the hospital, your blood pressure was severely elevated. While your primary complaint at that time was your right sided paralysis, your blood pressure level was likely a significat contributing factor that led to these issues and therefore controlling your blood pressure became one of our primary goals. You were initally admitted to the ICU where a medication IV drip was started to help lower your blood pressure. While this led to some improvement, additional medications were required. Eventually, your blood pressure was able to be adequately controlled with a regimen of oral medications (Labetalol 200mg two times a day, Lisinpril 5mg once a day, Amlodipine 10mg once  a day). Please continue taking all these medications as prescribed and follow up with your PCP as well as a cardiologist (Dr. Gilbert) and neurologist (Dr. Morgan) within a week of your discharge to ensure this issue remains well controlled.  PLEASE KEEP TARGET BLOOD PRESSURE LESS THAN 130/80 MM HG    Diagnosis: Thoracic aortic aneurysm  Assessment and Plan of Treatment: Imaging done while you were in the hospital revealed that you had an aneurysm in the descending portion of your thoracic aorta. Cardiothoracic surgery was consulted and they recommended against acute surgical intervention but instead to follow up as an outpatient. Please make an appointment with the cardiothoracic surgery specialist and follow up with them after leaving the Rehhab to monitor. (Information is provided in this dishcarge paperwork). In the meantime, please take your blood pressure medications as prescribed.    Diagnosis: Acute hyperactive delirium due to multiple etiologies  Assessment and Plan of Treatment: While you were in the hospital, you had multiple episodes of nightly agitation and delirium. We consulted a psychiatrist on how best to medically treat you and prescribed a medication (Seroquel) based on their recommendation. Please continue taking this medication as precribed (12.5mg at 8pm) and follow up with your primary care doctor soon after your discharge to ensure this issue remains well controlled.  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 Neurology Dr. Morgan  was consulted and they recommended against acute surgical intervention but instead to follow up as an outpatient. Please make an appointment with the neurology specialist and follow up with them soon after leaving the Rehab  (their information is provided in this dishcarge paperwork). In the meantime, please take your blood pressure medications as prescribed.    Diagnosis: Mass of parotid gland  Assessment and Plan of Treatment: Imaging done while you were in the hospital revealed that you had an unknown mass in your parotid gland. While there is no acute intervention required for this problem, it should be followed up as an outpatient including an ultrasound of the area to better visualize the mass. Please follow up with your primary care provider to make sure this issue remains well managed and to help coordinate an outpatient ultrasound.    Diagnosis: Acute UTI  Assessment and Plan of Treatment: While in the hospital, labwork showed that you had developed a UTI. You were treated with antibiotics without any related complications. Please make sure to see your primary care doctor soon after your discharge to ensure this issue remains well controlled.    Diagnosis: Pre-diabetes  Assessment and Plan of Treatment: You have previously been diagnosed with prediabetes. While in the hospital, we monitored your blood sugar and it remained within an acceptable range. Please make sure to see your primary care doctor soon after your discharge to ensure this issue remains well controlled.

## 2023-10-16 NOTE — DISCHARGE NOTE PROVIDER - NSDCCAREPROVSEEN_GEN_ALL_CORE_FT
Arnie Torres Arnie Torres Geevarghese Melissa, Arnie Mas, Geno Black, Jacques Staley, Nemesio Morgan, Finn

## 2023-10-16 NOTE — DISCHARGE NOTE PROVIDER - CARE PROVIDERS DIRECT ADDRESSES
[email or web address],DirectAddress_Unknown,DirectAddress_Unknown

## 2023-10-16 NOTE — DISCHARGE NOTE PROVIDER - CARE PROVIDER_API CALL
Jamie Trejo  Thoracic Surgery  [address] [place] [address], [place] [address]  Phone: [phone]  Fax: [phone]  Follow Up Time:     Finn Morgan  Neurology  [address] [facility], [address]  [place] [address]  Phone: [phone]  Fax: [phone]  Follow Up Time:    Jamie Trejo  Thoracic Surgery  [address] [place] [address], [place] [address]  Phone: [phone]  Fax: [phone]  Follow Up Time:     Finn Morgan  Neurology  [address] [facility]  [place] [address]  Phone: [phone]  Fax: [phone]  Follow Up Time:     Nestor Gilbert  Cardiology  [address] [place] [address]  Phone: [phone]  Fax: [phone]  Follow Up Time:    Jamie Trejo  Thoracic Surgery  [address] [place] [address], [place] [address]  Phone: [phone]  Fax: [phone]  Follow Up Time:     Finn Morgan  Neurology  [address] [facility] [address]  [place] [address]  Phone: [phone]  Fax: [phone]  Follow Up Time:     Nestor Gilbert  Cardiology  [address] [place] [address]  Phone: [phone]  Fax: [phone]  Follow Up Time:     Zara Daniel Manuel  Internal Medicine  [address] [place] [address]  Phone: [phone]  Fax: [phone]  Follow Up Time:

## 2023-10-16 NOTE — DISCHARGE NOTE PROVIDER - PROVIDER TOKENS
PROVIDER:[TOKEN:[[id number]]],PROVIDER:[TOKEN:[[id number]]] PROVIDER:[TOKEN:[[id number]]],PROVIDER:[TOKEN:[[id number]]],PROVIDER:[TOKEN:[[id number]]] PROVIDER:[TOKEN:[[id number]]],PROVIDER:[TOKEN:[[id number]]],PROVIDER:[TOKEN:[[id number]]],PROVIDER:[TOKEN:[[id number]]]

## 2023-10-17 DIAGNOSIS — R53.83 OTHER FATIGUE: ICD-10-CM

## 2023-10-17 DIAGNOSIS — Z51.5 ENCOUNTER FOR PALLIATIVE CARE: ICD-10-CM

## 2023-10-17 DIAGNOSIS — K11.8 OTHER DISEASES OF SALIVARY GLANDS: ICD-10-CM

## 2023-10-17 DIAGNOSIS — E07.9 DISORDER OF THYROID, UNSPECIFIED: ICD-10-CM

## 2023-10-17 DIAGNOSIS — R53.81 OTHER MALAISE: ICD-10-CM

## 2023-10-17 DIAGNOSIS — E43 UNSPECIFIED SEVERE PROTEIN-CALORIE MALNUTRITION: ICD-10-CM

## 2023-10-17 DIAGNOSIS — E86.0 DEHYDRATION: ICD-10-CM

## 2023-10-17 PROCEDURE — 99233 SBSQ HOSP IP/OBS HIGH 50: CPT | Mod: GC

## 2023-10-17 PROCEDURE — 99497 ADVNCD CARE PLAN 30 MIN: CPT

## 2023-10-17 PROCEDURE — 99232 SBSQ HOSP IP/OBS MODERATE 35: CPT

## 2023-10-17 RX ORDER — LABETALOL HCL 100 MG
100 TABLET ORAL ONCE
Refills: 0 | Status: COMPLETED | OUTPATIENT
Start: 2023-10-17 | End: 2023-10-17

## 2023-10-17 RX ORDER — QUETIAPINE FUMARATE 200 MG/1
12.5 TABLET, FILM COATED ORAL
Refills: 0 | Status: DISCONTINUED | OUTPATIENT
Start: 2023-10-17 | End: 2023-10-18

## 2023-10-17 RX ORDER — QUETIAPINE FUMARATE 200 MG/1
12.5 TABLET, FILM COATED ORAL
Refills: 0 | Status: DISCONTINUED | OUTPATIENT
Start: 2023-10-17 | End: 2023-10-17

## 2023-10-17 RX ORDER — LABETALOL HCL 100 MG
200 TABLET ORAL EVERY 12 HOURS
Refills: 0 | Status: DISCONTINUED | OUTPATIENT
Start: 2023-10-17 | End: 2023-10-18

## 2023-10-17 RX ORDER — SODIUM CHLORIDE 9 MG/ML
500 INJECTION INTRAMUSCULAR; INTRAVENOUS; SUBCUTANEOUS ONCE
Refills: 0 | Status: COMPLETED | OUTPATIENT
Start: 2023-10-17 | End: 2023-10-17

## 2023-10-17 RX ORDER — SODIUM CHLORIDE 9 MG/ML
1000 INJECTION, SOLUTION INTRAVENOUS
Refills: 0 | Status: DISCONTINUED | OUTPATIENT
Start: 2023-10-17 | End: 2023-10-18

## 2023-10-17 RX ADMIN — LISINOPRIL 5 MILLIGRAM(S): 2.5 TABLET ORAL at 06:25

## 2023-10-17 RX ADMIN — Medication 1 DROP(S): at 17:38

## 2023-10-17 RX ADMIN — ENOXAPARIN SODIUM 40 MILLIGRAM(S): 100 INJECTION SUBCUTANEOUS at 16:48

## 2023-10-17 RX ADMIN — Medication 100 MILLIGRAM(S): at 13:06

## 2023-10-17 RX ADMIN — CHLORHEXIDINE GLUCONATE 1 APPLICATION(S): 213 SOLUTION TOPICAL at 06:25

## 2023-10-17 RX ADMIN — QUETIAPINE FUMARATE 12.5 MILLIGRAM(S): 200 TABLET, FILM COATED ORAL at 21:46

## 2023-10-17 RX ADMIN — AMLODIPINE BESYLATE 10 MILLIGRAM(S): 2.5 TABLET ORAL at 06:25

## 2023-10-17 RX ADMIN — Medication 100 MILLIGRAM(S): at 06:25

## 2023-10-17 RX ADMIN — CLOPIDOGREL BISULFATE 75 MILLIGRAM(S): 75 TABLET, FILM COATED ORAL at 13:06

## 2023-10-17 RX ADMIN — ATORVASTATIN CALCIUM 80 MILLIGRAM(S): 80 TABLET, FILM COATED ORAL at 21:46

## 2023-10-17 RX ADMIN — SODIUM CHLORIDE 500 MILLILITER(S): 9 INJECTION INTRAMUSCULAR; INTRAVENOUS; SUBCUTANEOUS at 11:58

## 2023-10-17 RX ADMIN — SODIUM CHLORIDE 100 MILLILITER(S): 9 INJECTION, SOLUTION INTRAVENOUS at 15:34

## 2023-10-17 RX ADMIN — Medication 81 MILLIGRAM(S): at 13:06

## 2023-10-17 NOTE — PROGRESS NOTE ADULT - PROBLEM SELECTOR PLAN 4
p/w  transient right-sided weakness, raising concern for transient ischemic attack  's   High risk for intercranial hemorrhage and Stroke   Per Neuro, no role for permissive hypertension - Goal /80, Treat BP > 140/90

## 2023-10-17 NOTE — PROGRESS NOTE ADULT - PROBLEM SELECTOR PLAN 10
DVT PPx: Lovenox
DVT PPx: Lovenox
Pt w acute E. Coli UTI  - s/p abx course of CTX  - RESOLVED
DVT PPx: Lovenox

## 2023-10-17 NOTE — PROGRESS NOTE ADULT - PROBLEM SELECTOR PLAN 6
ICA Aneurysm  - CTH shows 4 mm outpouching projecting posteriorly inferiorly off the right supraclinoid internal carotid artery at its terminus, concerning for aneurysm  - No surgical intervention at this time  - Neurology consulted, Dr. Morgan

## 2023-10-17 NOTE — DISCHARGE NOTE NURSING/CASE MANAGEMENT/SOCIAL WORK - PATIENT PORTAL LINK FT
You can access the FollowMyHealth Patient Portal offered by [facility] by registering at the following website: [email or web address]. By joining [facility]’s FollowMyHealth portal, you will also be able to view your health information using other applications (apps) compatible with our system.

## 2023-10-17 NOTE — PROGRESS NOTE ADULT - CONVERSATION DETAILS
Met with the pt's spouse at the bedside, he expressed still overwhelmed not knowing what to expect or what to do.  Discuss patients overall functional decline, prognosis, treatment preferences and GOC.  Mr. Orourke, stated he remains hopeful pt is for discharge to [facility].  Readdressed LST sincluding CPR/intubation and artificial nutrition in the context of advanced illness, he expressed that he prefers to allow her a natural death and would not want her to be placed on a ventilator, have CPR done or a feeding tube. DIONTE drafted for DNR/DNI/no feeding tube.    Support provided.  D/w primary team

## 2023-10-17 NOTE — PROGRESS NOTE ADULT - PROBLEM SELECTOR PLAN 6
Independent prior to admission.  Currently bedbound. Total care.  Supportive care  OOB to chair as tolerated  Freq positioning    Pt eval noted

## 2023-10-17 NOTE — PROGRESS NOTE ADULT - PROBLEM SELECTOR PLAN 7
78 year old female with pmh of pre-diabetes and HLD presented for right sided weakness, found to have a NIHHS score of 13 and BP of 239/148. CTH showed no acute infarcts but subsequent MRI showed acute/subacute stroke. Pt was initially admitted to ICU for closer BP monitoring, approved for downgrade to telemetry on 10/12 for further medical management.  Poor prognosis.  Please see discussion noted above in GOC conversation.

## 2023-10-17 NOTE — PROGRESS NOTE ADULT - PROBLEM SELECTOR PLAN 1
CVA w/right-sided focal deficits, Neuro following   No surgical intervention at this time  CTH/Angio showed old infarcts w/ 4mm ICA outpouch concerning for aneurysm  DNR/DNI

## 2023-10-17 NOTE — PROGRESS NOTE ADULT - ATTENDING COMMENTS
INCOMPLETE NOTE    Patient was seen and examined with  at bedside with housestaff and Medical students        Vital Signs Last 24 Hrs:stable; reviewed as above        P/E:  NAD  AAOx2, no new focal deficit, right sided weakness same as before   CHEST/LUNG: Clear to auscultation bilaterally; No rales, rhonchi, wheezing, or rubs  HEART: Regular rate and rhythm; No murmurs, rubs, or gallops  ABDOMEN: Soft, Nontender, Nondistended; Bowel sounds present  EXTREMITIES:  2+ Peripheral Pulses, No clubbing, cyanosis, or edema  SKIN: No rashes or lesions    Labs: reviewed as above    A/P:  Acute hospital acquired delirium possibly multifactorial from vascular dementia, UTI, TIA in the setting of dementia  Hypertensive emergency resolved  Descending thoracic aneurysm 6cm with intra mural thrombi   Possible 4mm ICA aneurysm  Parotid gland mass  UTI  HLD    Plan:  Cont Seroquel at night   Avoid anticholinergics, benzodiazepines, limit lines/tubes/tethers/restraints, encourage frequent reorientation/reassurance by staff, encourage good sleep hygiene, adequate lighting  Cont diet as mental status improved   Aspiration and fall precaution   BP improved   Completed Ceftriaxone   No acute intervention for the aneurysm as per CT surgery, outpatient follow up  Outpatient follow up for parotid gland mass   Plan of care was discussed with  all questions and concerns were addressed and care was aligned with patient's wishes.  CM and SW for safe discharge  Possible DC in 1-2 days after better behavior control

## 2023-10-17 NOTE — PROGRESS NOTE ADULT - SUBJECTIVE AND OBJECTIVE BOX
PGY-1 Progress Note discussed with attending      PLEASE CONTACT ON CALL TEAM:  - On Call Team (Please refer to [place]) FROM 5:00 PM - 8:30PM  - Nightfloat Team FROM 8:30 -7:30 AM    INTERVAL HPI/OVERNIGHT EVENTS:   No acute events overnight.  Patient examined at bedside this AM.  Patient is very lethargic, responding to commands but unable to fully respond to questions/ROS.     REVIEW OF SYSTEMS:  Unable to obtain    MEDICATIONS  (STANDING):  amLODIPine   Tablet 10 milliGRAM(s) Oral daily  aspirin  chewable 81 milliGRAM(s) Oral daily  atorvastatin 80 milliGRAM(s) Oral at bedtime  chlorhexidine 2% Cloths 1 Application(s) Topical <User Schedule>  clopidogrel Tablet 75 milliGRAM(s) Oral daily  dextrose 5% + sodium chloride 0.45%. 1000 milliLiter(s) (100 mL/Hr) IV Continuous <Continuous>  enoxaparin Injectable 40 milliGRAM(s) SubCutaneous every 24 hours  influenza  Vaccine (HIGH DOSE) 0.7 milliLiter(s) IntraMuscular once  labetalol 200 milliGRAM(s) Oral every 12 hours  lisinopril 5 milliGRAM(s) Oral daily  QUEtiapine 12.5 milliGRAM(s) Oral <User Schedule>    MEDICATIONS  (PRN):      Vital Signs Last 24 Hrs  T(C): 36.9 (17 Oct 2023 14:03), Max: 36.9 (17 Oct 2023 14:03)  T(F): 98.4 (17 Oct 2023 14:03), Max: 98.4 (17 Oct 2023 14:03)  HR: 63 (17 Oct 2023 14:03) (63 - 81)  BP: 122/71 (17 Oct 2023 14:03) (115/99 - 154/90)  BP(mean): 106 (16 Oct 2023 20:53) (106 - 106)  RR: 18 (17 Oct 2023 14:03) (17 - 18)  SpO2: 95% (17 Oct 2023 14:03) (95% - 98%)    Parameters below as of 17 Oct 2023 14:03  Patient On (Oxygen Delivery Method): room air        PHYSICAL EXAMINATION:  GENERAL: NAD, lying in bed  HEENT: Dry mucous membranes, dry eyes. Atraumatic.  EYES: Conjunctiva and sclera clear  NECK: Supple. No JVD. Normal thyroid  CHEST/LUNG: Clear to auscultation. No rales or wheezing  HEART: Regular rate and rhythm. No abnormal heart sounds  ABDOMEN: Soft, nontender, and nondistended. Bowel sounds present. No pain or masses on palpation  NERVOUS SYSTEM: Lethargic but accusable to name/touch. Right sided upper/lower extremity weakness and facial droop noted  EXTREMITIES:  2+ Peripheral Pulses. No appreciable edema  SKIN: warm dry                          12.5   8.20  )-----------( 223      ( 16 Oct 2023 06:19 )             39.7     10-16    142  |  106  |  35<H>  ----------------------------<  107<H>  4.4   |  27  |  1.04    Ca    8.9      16 Oct 2023 06:19  Phos  3.7     10-16  Mg     2.5     10-16    TPro  7.2  /  Alb  3.2<L>  /  TBili  0.4  /  DBili  x   /  AST  16  /  ALT  19  /  AlkPhos  105  10-16    LIVER FUNCTIONS - ( 16 Oct 2023 06:19 )  Alb: 3.2 g/dL / Pro: 7.2 g/dL / ALK PHOS: 105 U/L / ALT: 19 U/L DA / AST: 16 U/L / GGT: x                   I&O's Summary    16 Oct 2023 07:01  -  17 Oct 2023 07:00  --------------------------------------------------------  IN: 0 mL / OUT: 100 mL / NET: -100 mL            CAPILLARY BLOOD GLUCOSE      RADIOLOGY & ADDITIONAL TESTS:

## 2023-10-17 NOTE — PROGRESS NOTE ADULT - SUBJECTIVE AND OBJECTIVE BOX
follow up on:  complex medical decision making related to goals of care    OVERNIGHT EVENTS: No events overnight. Pt lethargic, minimally verbal. NAD.  Spouse at the bedside     Present Symptoms:    Unable to obtain due to poor mentation]    MEDICATIONS  (STANDING):  amLODIPine   Tablet 10 milliGRAM(s) Oral daily  aspirin  chewable 81 milliGRAM(s) Oral daily  atorvastatin 80 milliGRAM(s) Oral at bedtime  chlorhexidine 2% Cloths 1 Application(s) Topical <User Schedule>  clopidogrel Tablet 75 milliGRAM(s) Oral daily  enoxaparin Injectable 40 milliGRAM(s) SubCutaneous every 24 hours  influenza  Vaccine (HIGH DOSE) 0.7 milliLiter(s) IntraMuscular once  labetalol 100 milliGRAM(s) Oral once  labetalol 200 milliGRAM(s) Oral every 12 hours  lisinopril 5 milliGRAM(s) Oral daily  QUEtiapine 12.5 milliGRAM(s) Oral <User Schedule>    MEDICATIONS  (PRN):  haloperidol    Injectable 1 milliGRAM(s) IntraMuscular every 6 hours PRN Agitation      PHYSICAL EXAM:  Vital Signs Last 24 Hrs  T(C): 36.7 (17 Oct 2023 11:41), Max: 36.8 (17 Oct 2023 11:00)  T(F): 98 (17 Oct 2023 11:41), Max: 98.2 (17 Oct 2023 11:00)  HR: 70 (17 Oct 2023 11:41) (63 - 81)  BP: 130/78 (17 Oct 2023 11:41) (115/99 - 154/90)  BP(mean): 106 (16 Oct 2023 20:53) (106 - 111)  RR: 18 (17 Oct 2023 11:41) (17 - 18)  SpO2: 96% (17 Oct 2023 11:41) (95% - 98%)    Parameters below as of 17 Oct 2023 11:41  Patient On (Oxygen Delivery Method): room air        General: Pt lethargic, AOX1, minimally verbal.  NAD    Palliative Performance Scale/Karnofsky Score: 30%  http://npcrc.org/files/news/palliative_performance_scale_[email or web address].pdf    HEENT: no abnormal lesion, dry mouth,   Lungs: unlabored on RA  CV: RRR, S1S2  GI: soft non distended non tender  incontinent  : incontinent  Musculoskeletal: weakness x4, bedbound, no edema  Skin: no abnormal skin lesions, poor skin turgor  Neuro: able to follow simple commands  Oral intake ability: poor po intake   aresis  Oral intake ability: unable/only mouth care minimal moderate full capability    LABS:                          12.5   8.20  )-----------( 223      ( 16 Oct 2023 06:19 )             39.7     10-16    142  |  106  |  35<H>  ----------------------------<  107<H>  4.4   |  27  |  1.04    Ca    8.9      16 Oct 2023 06:19  Phos  3.7     10-16  Mg     2.5     10-16    TPro  7.2  /  Alb  3.2<L>  /  TBili  0.4  /  DBili  x   /  AST  16  /  ALT  19  /  AlkPhos  105  10-16    Urinalysis Basic - ( 16 Oct 2023 06:19 )    Color: x / Appearance: x / SG: x / pH: x  Gluc: 107 mg/dL / Ketone: x  / Bili: x / Urobili: x   Blood: x / Protein: x / Nitrite: x   Leuk Esterase: x / RBC: x / WBC x   Sq Epi: x / Non Sq Epi: x / Bacteria: x        RADIOLOGY & ADDITIONAL STUDIES: reviewed    ADVANCE DIRECTIVES: MOSLT: DNR/DNI/no feeding tube/trial of IVF

## 2023-10-17 NOTE — PROGRESS NOTE ADULT - PROBLEM SELECTOR PLAN 5
Clinical evidence indicates that the patient has Severe protein calorie malnutrition/ 3rd degree  with dysphagia    In context of Chronic Illness (>1 month)    Energy/Food intake <50% of estimated energy requirement >5 days  Weight loss: Moderate - severe (lbs lost recently)  Body Fat loss: Severe   Cachexia, temporal wasting, muscle atrophy  Muscle mass loss: Severe  Skin failure/pressure ulcers   Strength: weakened severe bedbound    Recommend:   pleasure feeds as tolerated - aspiration precautions, careful hand-feeding, teaching to caregivers  nutritional supplements as tolerated, nutrition consult    No feeding tube

## 2023-10-17 NOTE — PROGRESS NOTE ADULT - PROBLEM/PLAN-4
DISPLAY PLAN FREE TEXT
Cane

## 2023-10-17 NOTE — PROGRESS NOTE ADULT - PROBLEM SELECTOR PLAN 2
Pt x 6mm Descending Aortic Aneurysm. Surgery following   1 cm mural thrombi around the aneurysm   No surgical intervention indicated at this time, c/w AC    -Poor prognosis.    DNR/DNI

## 2023-10-17 NOTE — PROGRESS NOTE ADULT - PROBLEM SELECTOR PLAN 1
Hypertensive Emergency ('s)  - EKG NSR , trop x1 negative   - Per Neuro, no role for permissive hypertension - Goal /80, Treat BP > 140/90  - BP now within acceptable limits  - c/w Labetalol 100mg TID  - c/w Amlodipine 10mg qd  - c/w Lisinopril 5mg qd

## 2023-10-17 NOTE — PROGRESS NOTE ADULT - PROBLEM SELECTOR PLAN 3
Incidental finding.  Mass of parotid gland.   CT head done for stroke revealed incidental parotid gland mass  - CT 10/13: 1.2 and 0.5 cm right parotid masses incidentally noted, of indeterminate neoplastic-malignant     Spouse unsure about doing any further workup

## 2023-10-17 NOTE — PROGRESS NOTE ADULT - PROBLEM SELECTOR PLAN 5
Patient has had multiple episodes of hyperactive delirium during hospital stay  - c/w Seroquel 12.5mg qhs  - Psych Dr. Reese, consulted

## 2023-10-17 NOTE — PROGRESS NOTE ADULT - ASSESSMENT
78 year old female with pmh of pre-diabetes and HLD presented for right sided weakness, found to have a NIHHS score of 13 and BP of 239/148. CTH showed no acute infarcts but subsequent MRI showed acute/subacute stroke. Pt was initially admitted to ICU for closer BP monitoring, approved for downgrade to medicine floor on 10/12 for further medical management.

## 2023-10-17 NOTE — PROGRESS NOTE ADULT - PROBLEM SELECTOR PLAN 3
Pt appears dehydrated and lethargic  - Like 2/2 decreased PO intake  - Dry mucous membranes and eyes noted on PE  - Started on artificial tears  - Started on D5 1/2NS at 100cc/hr  - Will reassess S&S to possible advance diet once pt is more alert and better able to follow commands

## 2023-10-18 VITALS
TEMPERATURE: 97 F | SYSTOLIC BLOOD PRESSURE: 98 MMHG | HEART RATE: 73 BPM | OXYGEN SATURATION: 96 % | DIASTOLIC BLOOD PRESSURE: 83 MMHG | RESPIRATION RATE: 18 BRPM

## 2023-10-18 LAB
ALBUMIN SERPL ELPH-MCNC: 2.9 G/DL — LOW (ref 3.5–5)
ALBUMIN SERPL ELPH-MCNC: 2.9 G/DL — LOW (ref 3.5–5)
ALP SERPL-CCNC: 97 U/L — SIGNIFICANT CHANGE UP (ref 40–120)
ALP SERPL-CCNC: 97 U/L — SIGNIFICANT CHANGE UP (ref 40–120)
ALT FLD-CCNC: 16 U/L DA — SIGNIFICANT CHANGE UP (ref 10–60)
ALT FLD-CCNC: 16 U/L DA — SIGNIFICANT CHANGE UP (ref 10–60)
ANION GAP SERPL CALC-SCNC: 9 MMOL/L — SIGNIFICANT CHANGE UP (ref 5–17)
ANION GAP SERPL CALC-SCNC: 9 MMOL/L — SIGNIFICANT CHANGE UP (ref 5–17)
AST SERPL-CCNC: 17 U/L — SIGNIFICANT CHANGE UP (ref 10–40)
AST SERPL-CCNC: 17 U/L — SIGNIFICANT CHANGE UP (ref 10–40)
BILIRUB SERPL-MCNC: 0.5 MG/DL — SIGNIFICANT CHANGE UP (ref 0.2–1.2)
BILIRUB SERPL-MCNC: 0.5 MG/DL — SIGNIFICANT CHANGE UP (ref 0.2–1.2)
BUN SERPL-MCNC: 30 MG/DL — HIGH (ref 7–18)
BUN SERPL-MCNC: 30 MG/DL — HIGH (ref 7–18)
CALCIUM SERPL-MCNC: 8.7 MG/DL — SIGNIFICANT CHANGE UP (ref 8.4–10.5)
CALCIUM SERPL-MCNC: 8.7 MG/DL — SIGNIFICANT CHANGE UP (ref 8.4–10.5)
CHLORIDE SERPL-SCNC: 107 MMOL/L — SIGNIFICANT CHANGE UP (ref 96–108)
CHLORIDE SERPL-SCNC: 107 MMOL/L — SIGNIFICANT CHANGE UP (ref 96–108)
CO2 SERPL-SCNC: 24 MMOL/L — SIGNIFICANT CHANGE UP (ref 22–31)
CO2 SERPL-SCNC: 24 MMOL/L — SIGNIFICANT CHANGE UP (ref 22–31)
CREAT SERPL-MCNC: 0.81 MG/DL — SIGNIFICANT CHANGE UP (ref 0.5–1.3)
CREAT SERPL-MCNC: 0.81 MG/DL — SIGNIFICANT CHANGE UP (ref 0.5–1.3)
EGFR: 75 ML/MIN/1.73M2 — SIGNIFICANT CHANGE UP
EGFR: 75 ML/MIN/1.73M2 — SIGNIFICANT CHANGE UP
GLUCOSE SERPL-MCNC: 98 MG/DL — SIGNIFICANT CHANGE UP (ref 70–99)
GLUCOSE SERPL-MCNC: 98 MG/DL — SIGNIFICANT CHANGE UP (ref 70–99)
HCT VFR BLD CALC: 39.8 % — SIGNIFICANT CHANGE UP (ref 34.5–45)
HCT VFR BLD CALC: 39.8 % — SIGNIFICANT CHANGE UP (ref 34.5–45)
HGB BLD-MCNC: 12.4 G/DL — SIGNIFICANT CHANGE UP (ref 11.5–15.5)
HGB BLD-MCNC: 12.4 G/DL — SIGNIFICANT CHANGE UP (ref 11.5–15.5)
MAGNESIUM SERPL-MCNC: 2.3 MG/DL — SIGNIFICANT CHANGE UP (ref 1.6–2.6)
MAGNESIUM SERPL-MCNC: 2.3 MG/DL — SIGNIFICANT CHANGE UP (ref 1.6–2.6)
MCHC RBC-ENTMCNC: 28.3 PG — SIGNIFICANT CHANGE UP (ref 27–34)
MCHC RBC-ENTMCNC: 28.3 PG — SIGNIFICANT CHANGE UP (ref 27–34)
MCHC RBC-ENTMCNC: 31.2 GM/DL — LOW (ref 32–36)
MCHC RBC-ENTMCNC: 31.2 GM/DL — LOW (ref 32–36)
MCV RBC AUTO: 90.9 FL — SIGNIFICANT CHANGE UP (ref 80–100)
MCV RBC AUTO: 90.9 FL — SIGNIFICANT CHANGE UP (ref 80–100)
NRBC # BLD: 0 /100 WBCS — SIGNIFICANT CHANGE UP (ref 0–0)
NRBC # BLD: 0 /100 WBCS — SIGNIFICANT CHANGE UP (ref 0–0)
PHOSPHATE SERPL-MCNC: 3.6 MG/DL — SIGNIFICANT CHANGE UP (ref 2.5–4.5)
PHOSPHATE SERPL-MCNC: 3.6 MG/DL — SIGNIFICANT CHANGE UP (ref 2.5–4.5)
PLATELET # BLD AUTO: 230 K/UL — SIGNIFICANT CHANGE UP (ref 150–400)
PLATELET # BLD AUTO: 230 K/UL — SIGNIFICANT CHANGE UP (ref 150–400)
POTASSIUM SERPL-MCNC: 3.4 MMOL/L — LOW (ref 3.5–5.3)
POTASSIUM SERPL-MCNC: 3.4 MMOL/L — LOW (ref 3.5–5.3)
POTASSIUM SERPL-SCNC: 3.4 MMOL/L — LOW (ref 3.5–5.3)
POTASSIUM SERPL-SCNC: 3.4 MMOL/L — LOW (ref 3.5–5.3)
PROT SERPL-MCNC: 6.8 G/DL — SIGNIFICANT CHANGE UP (ref 6–8.3)
PROT SERPL-MCNC: 6.8 G/DL — SIGNIFICANT CHANGE UP (ref 6–8.3)
RBC # BLD: 4.38 M/UL — SIGNIFICANT CHANGE UP (ref 3.8–5.2)
RBC # BLD: 4.38 M/UL — SIGNIFICANT CHANGE UP (ref 3.8–5.2)
RBC # FLD: 13.2 % — SIGNIFICANT CHANGE UP (ref 10.3–14.5)
RBC # FLD: 13.2 % — SIGNIFICANT CHANGE UP (ref 10.3–14.5)
SODIUM SERPL-SCNC: 140 MMOL/L — SIGNIFICANT CHANGE UP (ref 135–145)
SODIUM SERPL-SCNC: 140 MMOL/L — SIGNIFICANT CHANGE UP (ref 135–145)
WBC # BLD: 8.69 K/UL — SIGNIFICANT CHANGE UP (ref 3.8–10.5)
WBC # BLD: 8.69 K/UL — SIGNIFICANT CHANGE UP (ref 3.8–10.5)
WBC # FLD AUTO: 8.69 K/UL — SIGNIFICANT CHANGE UP (ref 3.8–10.5)
WBC # FLD AUTO: 8.69 K/UL — SIGNIFICANT CHANGE UP (ref 3.8–10.5)

## 2023-10-18 PROCEDURE — 99239 HOSP IP/OBS DSCHRG MGMT >30: CPT | Mod: GC

## 2023-10-18 RX ORDER — POTASSIUM CHLORIDE 20 MEQ
40 PACKET (EA) ORAL ONCE
Refills: 0 | Status: COMPLETED | OUTPATIENT
Start: 2023-10-18 | End: 2023-10-18

## 2023-10-18 RX ORDER — LABETALOL HCL 100 MG
1 TABLET ORAL
Qty: 0 | Refills: 0 | DISCHARGE
Start: 2023-10-18

## 2023-10-18 RX ORDER — POTASSIUM CHLORIDE 20 MEQ
10 PACKET (EA) ORAL
Refills: 0 | Status: DISCONTINUED | OUTPATIENT
Start: 2023-10-18 | End: 2023-10-18

## 2023-10-18 RX ORDER — FAMOTIDINE 10 MG/ML
1 INJECTION INTRAVENOUS
Qty: 42 | Refills: 0
Start: 2023-10-18 | End: 2023-11-07

## 2023-10-18 RX ADMIN — CLOPIDOGREL BISULFATE 75 MILLIGRAM(S): 75 TABLET, FILM COATED ORAL at 12:53

## 2023-10-18 RX ADMIN — CHLORHEXIDINE GLUCONATE 1 APPLICATION(S): 213 SOLUTION TOPICAL at 06:51

## 2023-10-18 RX ADMIN — AMLODIPINE BESYLATE 10 MILLIGRAM(S): 2.5 TABLET ORAL at 06:51

## 2023-10-18 RX ADMIN — Medication 81 MILLIGRAM(S): at 12:53

## 2023-10-18 RX ADMIN — Medication 1 DROP(S): at 12:54

## 2023-10-18 RX ADMIN — Medication 100 MILLIEQUIVALENT(S): at 12:23

## 2023-10-18 RX ADMIN — LISINOPRIL 5 MILLIGRAM(S): 2.5 TABLET ORAL at 06:51

## 2023-10-18 RX ADMIN — Medication 200 MILLIGRAM(S): at 06:51

## 2023-10-18 RX ADMIN — SODIUM CHLORIDE 100 MILLILITER(S): 9 INJECTION, SOLUTION INTRAVENOUS at 06:57

## 2023-10-18 RX ADMIN — Medication 40 MILLIEQUIVALENT(S): at 12:54

## 2023-10-18 RX ADMIN — Medication 1 DROP(S): at 06:52

## 2023-10-18 RX ADMIN — Medication 1 DROP(S): at 00:45

## 2023-11-27 NOTE — PHYSICAL THERAPY INITIAL EVALUATION ADULT - PATIENT/FAMILY/SIGNIFICANT OTHER GOALS STATEMENT, PT EVAL
HR=86 bpm, JGYK=817/60 mmhg, SpO2=98.0 %, Resp=13 B/min, EtCO2=22 mmHg, Apnea=3 Seconds, Pain=0, Lin=2 spouse would like pt. to improve